# Patient Record
Sex: MALE | Race: WHITE | NOT HISPANIC OR LATINO | Employment: FULL TIME | URBAN - METROPOLITAN AREA
[De-identification: names, ages, dates, MRNs, and addresses within clinical notes are randomized per-mention and may not be internally consistent; named-entity substitution may affect disease eponyms.]

---

## 2017-05-31 ENCOUNTER — OFFICE VISIT (OUTPATIENT)
Dept: FAMILY MEDICINE | Facility: CLINIC | Age: 37
End: 2017-05-31

## 2017-05-31 VITALS
DIASTOLIC BLOOD PRESSURE: 82 MMHG | BODY MASS INDEX: 22.86 KG/M2 | HEART RATE: 101 BPM | SYSTOLIC BLOOD PRESSURE: 131 MMHG | OXYGEN SATURATION: 97 % | WEIGHT: 189 LBS

## 2017-05-31 DIAGNOSIS — F41.1 GENERALIZED ANXIETY DISORDER: ICD-10-CM

## 2017-05-31 DIAGNOSIS — F33.0 MAJOR DEPRESSIVE DISORDER, RECURRENT EPISODE, MILD (H): Chronic | ICD-10-CM

## 2017-05-31 DIAGNOSIS — M25.512 LEFT SHOULDER PAIN, UNSPECIFIED CHRONICITY: Primary | ICD-10-CM

## 2017-05-31 ASSESSMENT — PAIN SCALES - GENERAL: PAINLEVEL: NO PAIN (1)

## 2017-05-31 NOTE — NURSING NOTE
Chief Complaint   Patient presents with     Shoulder Pain     right, no known injury.     37 year old      Blood pressure 131/82, pulse 101, weight 189 lb (85.7 kg), SpO2 97 %. Body mass index is 22.86 kg/(m^2).    Wt Readings from Last 2 Encounters:   05/31/17 189 lb (85.7 kg)   12/29/16 190 lb 8 oz (86.4 kg)     BP Readings from Last 3 Encounters:   05/31/17 131/82   12/29/16 127/79   12/04/16 110/62       Patient Active Problem List   Diagnosis     Benign neoplasm of skin     History of thyroid disease     Numbness in both hands     Numbness in feet     Ocular migraine - Both Eyes     Major Depression     Generalized anxiety disorder       Current Outpatient Prescriptions   Medication Sig Dispense Refill     FLUoxetine 20 MG tablet Take 20 mg by mouth daily       ibuprofen (ADVIL) 200 MG tablet Take 200 mg by mouth every 4 hours as needed for mild pain       ALPRAZolam (XANAX) 0.25 MG tablet Take bid prn 30 tablet 0       Social History   Substance Use Topics     Smoking status: Never Smoker     Smokeless tobacco: Never Used     Alcohol use Yes      Comment: 4-5 days per week one beer or glass of wine         Health Maintenance Due   Topic Date Due     LIPID SCREEN Q5 YR MALE (SYSTEM ASSIGNED)  02/15/2017       DENILSON BROWN CMA  May 31, 2017 9:46 AM

## 2017-05-31 NOTE — MR AVS SNAPSHOT
After Visit Summary   5/31/2017    Asa Mann    MRN: [id number]           Patient Information     Date Of Birth          1980        Visit Information        Provider Department      5/31/2017 9:40 AM Rita Rios MD [facility]        Today's Diagnoses     Left shoulder pain, unspecified chronicity    -  1    Generalized anxiety disorder        Major Depression           Follow-ups after your visit        Additional Services     SPORTS MEDICINE REFERRAL       Your provider has referred you to:      [facility]: Sports Medicine Clinic [facility] [phone]   [email or web address]    Please be aware that coverage of these services is subject to the terms and limitations of your health insurance plan.  Call member services at your health plan with any benefit or coverage questions.      Please bring the following to your appointment:    >>   Any x-rays, CTs or MRIs which have been performed.  Contact the facility where they were done to arrange for  prior to your scheduled appointment.  Any new CT, MRI or other procedures ordered by your specialist must be performed at a [facility] facility or coordinated by your clinic's referral office.    >>   List of current medications   >>   This referral request   >>   Any documents/labs given to you for this referral                  Who to contact     Please call your clinic at [phone] to:    Ask questions about your health    Make or cancel appointments    Discuss your medicines    Learn about your test results    Speak to your doctor   If you have compliments or concerns about an experience at your clinic, or if you wish to file a complaint, please contact [facility] Patient Relations at [phone] or email us at [email or web address]         Additional Information About Your Visit        MyChart Information     [employer] gives you secure access to  your electronic health record. If you see a primary care provider, you can also send messages to your care team and make appointments. If you have questions, please call your primary care clinic.  If you do not have a primary care provider, please call [phone] and they will assist you.      Bill.Forward is an electronic gateway that provides easy, online access to your medical records. With Bill.Forward, you can request a clinic appointment, read your test results, renew a prescription or communicate with your care team.     To access your existing account, please contact your [facility] or call [phone] for assistance.        Care EveryWhere ID     This is your Care EveryWhere ID. This could be used by other organizations to access your [place] medical records  [id number]        Your Vitals Were     Pulse Pulse Oximetry BMI (Body Mass Index)             101 97% 22.86 kg/m2          Blood Pressure from Last 3 Encounters:   05/31/17 131/82   12/29/16 127/79   12/04/16 110/62    Weight from Last 3 Encounters:   05/31/17 189 lb (85.7 kg)   12/29/16 190 lb 8 oz (86.4 kg)   12/04/16 187 lb 4.8 oz (85 kg)              We Performed the Following     SPORTS MEDICINE REFERRAL          Today's Medication Changes          These changes are accurate as of: 5/31/17 10:09 AM.  If you have any questions, ask your nurse or doctor.               Start taking these medicines.        Dose/Directions    FLUoxetine HCl (PMDD) 20 MG Caps   Used for:  Generalized anxiety disorder, Major depressive disorder, recurrent episode, mild (H)   Started by:  Rita Rios MD        Take one daily   Quantity:  90 capsule   Refills:  3         Stop taking these medicines if you haven't already. Please contact your care team if you have questions.     predniSONE 10 MG tablet   Commonly known as:  DELTASONE   Stopped by:  Rita Rios MD                Where to get your medicines      These  medications were sent to [facility] [address] [facility] [address] [facility] [address] [facility]  [address] [facility] [address]     Phone:  [phone]     FLUoxetine HCl (PMDD) 20 MG Caps                Primary Care Provider Office Phone # Fax #    Rita Rios [phone]       [facility] [address] [facility]  [facility] [address]        Thank you!     Thank you for choosing [facility]  for your care. Our goal is always to provide you with excellent care. Hearing back from our patients is one way we can continue to improve our services. Please take a few minutes to complete the written survey that you may receive in the mail after your visit with us. Thank you!             Your Updated Medication List - Protect others around you: Learn how to safely use, store and throw away your medicines at www.disposemymeds.org.          This list is accurate as of: 5/31/17 10:09 AM.  Always use your most recent med list.                   Brand Name Dispense Instructions for use    ADVIL 200 MG tablet   Generic drug:  ibuprofen      Take 200 mg by mouth every 4 hours as needed for mild pain       ALPRAZolam 0.25 MG tablet    XANAX    30 tablet    Take bid prn       FLUoxetine 20 MG tablet      Take 20 mg by mouth daily       FLUoxetine HCl (PMDD) 20 MG Caps     90 capsule    Take one daily

## 2017-05-31 NOTE — PROGRESS NOTES
Asa Mann is a 37 year old male here for the following issues:     Left shoulder pain, unspecified chronicity    Rich has had left shoulder pain since later April. He cannot recall an injury. He has  mild but persistent pain. He is right hand dominant. Takes advil. Hurts mainly in anterior left shoulder. No neck or back pain. Pain is worsened with reaching overhead, with pushing or pulling objects. Pain is least if he keeps his arms at his side.     Anxiety and depression  Goes to counseling regularly, doing well, Fluoxetine 20mg daily.   PHQ9 score = 3  NAVYA 7 score = 4      Patient Active Problem List   Diagnosis     Benign neoplasm of skin     History of thyroid disease     Numbness in both hands     Numbness in feet     Ocular migraine - Both Eyes     Major Depression     Generalized anxiety disorder       Current Outpatient Prescriptions   Medication Sig Dispense Refill     predniSONE (DELTASONE) 10 MG tablet Take 4 tabs per day for 3 days, then 3 tabs on following day, then 2 tabs 17 tablet 0     FLUoxetine 20 MG tablet Take 20 mg by mouth daily       ibuprofen (ADVIL) 200 MG tablet Take 200 mg by mouth every 4 hours as needed for mild pain       ALPRAZolam (XANAX) 0.25 MG tablet Take bid prn 30 tablet 0       Allergies   Allergen Reactions     Amoxicillin Other (See Comments)     Tops of legs turned red, hot and itchy     Zithromax [Azithromycin Dihydrate] Diarrhea        EXAM  /82  Pulse 101  Wt 189 lb (85.7 kg)  SpO2 97%  BMI 22.86 kg/m2  Gen: Alert, pleasant, NAD  Left shoulder : point tenderness over anterior/ top of left shoulder  Pain with bringing arms above head      Assessment:  (M25.512) Left shoulder pain, unspecified chronicity  (primary encounter diagnosis)  Comment: persistent pain, no injury  Plan: SPORTS MEDICINE REFERRAL        Refer to sports medicine for evaluation and treatment    (F41.1) Generalized anxiety disorder  Comment: improved on fluoxetine 20mg daily  Plan:  FLUoxetine HCl, PMDD, 20 MG CAPS        Refilled medication    (F33.0) Major Depression  Comment: doing well  Plan: FLUoxetine HCl, PMDD, 20 MG CAPS        Refilled medication    Rita Rios MD  Internal Medicine/Pediatrics

## 2017-06-28 ASSESSMENT — ANXIETY QUESTIONNAIRES
2. NOT BEING ABLE TO STOP OR CONTROL WORRYING: NOT AT ALL
1. FEELING NERVOUS, ANXIOUS, OR ON EDGE: SEVERAL DAYS
IF YOU CHECKED OFF ANY PROBLEMS ON THIS QUESTIONNAIRE, HOW DIFFICULT HAVE THESE PROBLEMS MADE IT FOR YOU TO DO YOUR WORK, TAKE CARE OF THINGS AT HOME, OR GET ALONG WITH OTHER PEOPLE: SOMEWHAT DIFFICULT
GAD7 TOTAL SCORE: 4
5. BEING SO RESTLESS THAT IT IS HARD TO SIT STILL: NOT AT ALL
7. FEELING AFRAID AS IF SOMETHING AWFUL MIGHT HAPPEN: NOT AT ALL
6. BECOMING EASILY ANNOYED OR IRRITABLE: SEVERAL DAYS
3. WORRYING TOO MUCH ABOUT DIFFERENT THINGS: SEVERAL DAYS

## 2017-06-28 ASSESSMENT — PATIENT HEALTH QUESTIONNAIRE - PHQ9: 5. POOR APPETITE OR OVEREATING: SEVERAL DAYS

## 2017-06-29 ASSESSMENT — ANXIETY QUESTIONNAIRES: GAD7 TOTAL SCORE: 4

## 2017-06-29 ASSESSMENT — PATIENT HEALTH QUESTIONNAIRE - PHQ9: SUM OF ALL RESPONSES TO PHQ QUESTIONS 1-9: 3

## 2017-08-02 DIAGNOSIS — Z13.220 SCREENING FOR HYPERLIPIDEMIA: Primary | ICD-10-CM

## 2017-08-07 DIAGNOSIS — Z13.220 SCREENING FOR HYPERLIPIDEMIA: ICD-10-CM

## 2017-08-07 LAB
ALT SERPL-CCNC: 29 U/L (ref 0–70)
AST SERPL-CCNC: 25 U/L (ref 0–55)
CHOLEST SERPL-MCNC: 240 MG/DL (ref 0–200)
CHOLEST/HDLC SERPL: 5 {RATIO} (ref 0–5)
FASTING SPECIMEN: YES
GLUCOSE SERPL-MCNC: 101 MG/DL (ref 60–109)
HDLC SERPL-MCNC: 48 MG/DL
LDLC SERPL CALC-MCNC: 160 MG/DL (ref 0–129)
TRIGL SERPL-MCNC: 160 MG/DL (ref 0–150)
VLDL-CHOLESTEROL: 32 (ref 7–32)

## 2017-10-04 ENCOUNTER — OFFICE VISIT (OUTPATIENT)
Dept: OPTOMETRY | Facility: CLINIC | Age: 37
End: 2017-10-04

## 2017-10-04 DIAGNOSIS — H52.13 MYOPIA, BILATERAL: Primary | ICD-10-CM

## 2017-10-04 DIAGNOSIS — H52.221 REGULAR ASTIGMATISM OF RIGHT EYE: ICD-10-CM

## 2017-10-04 ASSESSMENT — SLIT LAMP EXAM - LIDS
COMMENTS: NORMAL
COMMENTS: NORMAL

## 2017-10-04 ASSESSMENT — VISUAL ACUITY
METHOD: SNELLEN - LINEAR
OS_CC: 20/20-1
OD_CC: 20/25+2
CORRECTION_TYPE: GLASSES

## 2017-10-04 ASSESSMENT — REFRACTION_CURRENTRX
OS_DIAMETER: 14.2
OD_BASECURVE: 8.5
OD_SPHERE: -4.50
OD_CYLINDER: -1.25
OS_BASECURVE: 8.5
OS_BRAND: ACUVUE 1 DAY MOIST
OS_SPHERE: -5.00
OD_DIAMETER: 14.5
OD_BRAND: ACUVUE 1 DAY MOIST ASTIGMATISM
OD_AXIS: 020

## 2017-10-04 ASSESSMENT — TONOMETRY
IOP_METHOD: ICARE
OS_IOP_MMHG: 26
OD_IOP_MMHG: 21
IOP_METHOD: ICARE
OS_IOP_MMHG: 17

## 2017-10-04 ASSESSMENT — REFRACTION_WEARINGRX
OS_SPHERE: -5.00
SPECS_TYPE: 2 YEARS
OD_CYLINDER: +1.00
OS_CYLINDER: SPHERE
OD_AXIS: 100
OD_SPHERE: -6.00

## 2017-10-04 ASSESSMENT — REFRACTION_MANIFEST
OD_AXIS: 113
OD_SPHERE: -6.50
OS_SPHERE: -5.25
OS_CYLINDER: SPHERE
OD_CYLINDER: +1.50

## 2017-10-04 ASSESSMENT — EXTERNAL EXAM - LEFT EYE: OS_EXAM: NORMAL

## 2017-10-04 ASSESSMENT — EXTERNAL EXAM - RIGHT EYE: OD_EXAM: NORMAL

## 2017-10-04 ASSESSMENT — CUP TO DISC RATIO
OS_RATIO: 0.35
OD_RATIO: 0.35

## 2017-10-04 ASSESSMENT — CONF VISUAL FIELD
OS_NORMAL: 1
OD_NORMAL: 1

## 2017-10-04 NOTE — MR AVS SNAPSHOT
After Visit Summary   10/4/2017    Asa Mann    MRN: [id number]           Patient Information     Date Of Birth          1980        Visit Information        Provider Department      10/4/2017 3:00 PM Pre[facility], [facility]        Today's Diagnoses     Myopia, bilateral    -  1    Regular astigmatism of right eye           Follow-ups after your visit        Who to contact     Please call your clinic at [phone] to:    Ask questions about your health    Make or cancel appointments    Discuss your medicines    Learn about your test results    Speak to your doctor   If you have compliments or concerns about an experience at your clinic, or if you wish to file a complaint, please contact [facility] Patient Relations at [phone] or email us at [email or web address]         Additional Information About Your Visit        MyChart Information     [employer] gives you secure access to your electronic health record. If you see a primary care provider, you can also send messages to your care team and make appointments. If you have questions, please call your primary care clinic.  If you do not have a primary care provider, please call [phone] and they will assist you.      [email or web address] is an electronic gateway that provides easy, online access to your medical records. With [email or web address], you can request a clinic appointment, read your test results, renew a prescription or communicate with your care team.     To access your existing account, please contact your [facility] or call [phone] for assistance.        Care EveryWhere ID     This is your Care EveryWhere ID. This could be used by other organizations to access your [place] medical records  [id number]         Blood Pressure from Last 3 Encounters:   05/31/17 131/82   12/29/16 127/79   12/04/16 110/62    Weight from Last 3 Encounters:   05/31/17 85.7 kg (189  lb)   12/29/16 86.4 kg (190 lb 8 oz)   12/04/16 85 kg (187 lb 4.8 oz)              We Performed the Following     HC CONTACT LENS FITTING COSMETIC LVL 1 (55287.011)     REFRACTION [[id number]]        Primary Care Provider Office Phone # Fax #    Rita Kamla Rios [phone]        [address] [place] [address]        Equal Access to Services     LAMONTE CAMPUZANO : Hadii aad ku hadasho Soomaali, waaxda luqadaha, qaybta kaalmada adeegyada, waxay idiin hayaan adeeg sofieninamaite ladigna . So [facility] [phone].    ATENCIÓN: Si vi madison, tiene a madrid disposición servicios gratuitos de asistencia lingüística. [facility] of [phone].    We comply with applicable federal civil rights laws and [place] laws. We do not discriminate on the basis of race, color, national origin, age, disability, sex, sexual orientation, or gender identity.            Thank you!     Thank you for choosing EYE CLINIC  for your care. Our goal is always to provide you with excellent care. Hearing back from our patients is one way we can continue to improve our services. Please take a few minutes to complete the written survey that you may receive in the mail after your visit with us. Thank you!             Your Updated Medication List - Protect others around you: Learn how to safely use, store and throw away your medicines at www.disposemymeds.org.          This list is accurate as of: 10/4/17 11:59 PM.  Always use your most recent med list.                   Brand Name Dispense Instructions for use Diagnosis    ADVIL 200 MG tablet   Generic drug:  ibuprofen      Take 200 mg by mouth every 4 hours as needed for mild pain        ALPRAZolam 0.25 MG tablet    XANAX    30 tablet    Take bid prn    Anxiety and depression       FLUoxetine 20 MG tablet      Take 20 mg by mouth daily        FLUoxetine HCl (PMDD) 20 MG Caps     90 capsule    Take one daily    Generalized anxiety disorder, Major depressive disorder, recurrent episode,  mild (H)

## 2017-10-05 NOTE — PROGRESS NOTES
"A/P  1.) Myopia OU, Astigmatism OD  -Small spec Rx change, updated today  -Part-time CL wear for sports/exercise, updated CLRx today for dailies  -Fit in two different brands d/t fit. Best combination written  -He would also like to order Halloween lenses (reviewed proper CL safety)    Monitor 1-2 years routine    Contact Lens Billing  V-Code:  - CL, other  Final Contact Lens Rx      Brand Base Curve Diameter Sphere Cylinder Axis Lens   Right Clariti 1 Day Toric 8.6 14.3 -4.75 -1.25 020    Left Acuvue 1 Day Moist 8.5 14.2 -5.00          Expiration Date:  10/4/19    Replacement:  Daily    Solutions:  n/a    Wearing Schedule:  Daily wear only      Final Contact Lens Rx #2      Brand Base Curve Diameter Sphere Cylinder Axis Lens   Right Gothika lens   -5.00   \"Banshee\"   Left Gothika lens   -5.00   \"Banshee\"            # of units: 2  Price per Unit: $15    These are for cosmetic contact lenses.    Encounter Diagnoses   Name Primary?     Myopia, bilateral Yes     Regular astigmatism of right eye           "

## 2017-11-25 NOTE — PROGRESS NOTES
Asa Mann is a 37 year old male here for the following issues:    Anxiety  Rich is on fluoxetine 20mg daily. He has a therapist and meets 4x per week. He reports he and his wife have had marital problems and are planning to divorce. They have  as of September. No children. He has a very old Rx for alprazolam that he has had since 2015. Would like another Rx to use prn. No current use of Etoh or drugs. Sleep is good, no issues there. 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  He eats a healthy diet, getting exercise.   He is not sure if he needs to increase the fluoxetine. No depression. Occasionally has panic attacks. He reports sweating as side effect of the fluoxetine. Also has gained about 10 pounds. Concerned that if he increases the dose he may have some sexual side effects but denies that currently.      PHQ9 score = 3  NAVYA 7 score = 9        Screen for STD (sexually transmitted disease)  Rich would like routine STD testing. He is  and wishes to have testing. No current relationship. Discussed safe sex with condom use with any new partner and he concurs. No current dysuria, or other symptom.     Patient Active Problem List   Diagnosis     Benign neoplasm of skin     History of thyroid disease     Numbness in both hands     Numbness in feet     Ocular migraine - Both Eyes     Major Depression     Generalized anxiety disorder       Current Outpatient Prescriptions   Medication Sig Dispense Refill     FLUoxetine HCl, PMDD, 20 MG CAPS Take one daily 90 capsule 3     FLUoxetine 20 MG tablet Take 20 mg by mouth daily       ibuprofen (ADVIL) 200 MG tablet Take 200 mg by mouth every 4 hours as needed for mild pain       ALPRAZolam (XANAX) 0.25 MG tablet Take bid prn 30 tablet 0       Allergies   Allergen Reactions     Amoxicillin Other (See Comments)     Tops of legs turned red, hot and itchy     Zithromax [Azithromycin  "Dihydrate] Diarrhea        EXAM  /81 (BP Location: Right arm, Patient Position: Chair, Cuff Size: Adult Regular)  Pulse 80  Temp 98.4  F (36.9  C) (Oral)  Ht 6' 2.69\" (189.7 cm)  Wt 186 lb (84.4 kg)  SpO2 97%  BMI 23.44 kg/m2  Gen: Alert, pleasant, NAD  Psych: casually dressed, good eye contact and normal speech, affect is positive.      Assessment:  (F41.1) Generalized anxiety disorder  (primary encounter diagnosis)  Comment: currently increased situational stress, with divorce, change in job stress  Plan: ALPRAZolam (XANAX) 0.25 MG tablet        Continue with fluoxetine 20mg daily, may increase to 2 capsules daily if desired over the next month as he has a 90 day supply. rx for alprazolam with caution not to drink Etoh with med, do not drive with med on board. If he is using alprazolam regularly, then would increase fluoxetine dose. Continue therapy    (Z11.3) Screen for STD (sexually transmitted disease)  Comment: routine testing, no current symptoms  Plan: Neisseria gonorrhoeae PCR, Chlamydia         trachomatis PCR, Anti Treponema, HIV Antigen         Antibody Combo        Discussed safe sex practices.     Rita Rios MD  Internal Medicine/Pediatrics    "

## 2017-11-28 ENCOUNTER — OFFICE VISIT (OUTPATIENT)
Dept: FAMILY MEDICINE | Facility: CLINIC | Age: 37
End: 2017-11-28

## 2017-11-28 VITALS
OXYGEN SATURATION: 97 % | SYSTOLIC BLOOD PRESSURE: 120 MMHG | HEART RATE: 80 BPM | TEMPERATURE: 98.4 F | WEIGHT: 186 LBS | DIASTOLIC BLOOD PRESSURE: 81 MMHG | HEIGHT: 75 IN | BODY MASS INDEX: 23.13 KG/M2

## 2017-11-28 DIAGNOSIS — F41.1 GENERALIZED ANXIETY DISORDER: Primary | ICD-10-CM

## 2017-11-28 DIAGNOSIS — Z11.3 SCREEN FOR STD (SEXUALLY TRANSMITTED DISEASE): ICD-10-CM

## 2017-11-28 LAB — HIV 1+2 AB+HIV1 P24 AG SERPL QL IA: NONREACTIVE

## 2017-11-28 RX ORDER — ALPRAZOLAM 0.25 MG
TABLET ORAL
Qty: 30 TABLET | Refills: 0 | Status: SHIPPED | OUTPATIENT
Start: 2017-11-28 | End: 2021-01-11

## 2017-11-28 ASSESSMENT — PAIN SCALES - GENERAL: PAINLEVEL: NO PAIN (1)

## 2017-11-28 NOTE — NURSING NOTE
"37 year old  Chief Complaint   Patient presents with     Recheck Medication     Check on anxiety meds -xanax.      Labs Only     Wants an STD screening. Everything that can be checked, HIV, etc.        Blood pressure 120/81, pulse 80, temperature 98.4  F (36.9  C), temperature source Oral, height 6' 2.69\" (189.7 cm), weight 186 lb (84.4 kg), SpO2 97 %. Body mass index is 23.44 kg/(m^2).  Patient Active Problem List   Diagnosis     Benign neoplasm of skin     History of thyroid disease     Numbness in both hands     Numbness in feet     Ocular migraine - Both Eyes     Major Depression     Generalized anxiety disorder       Wt Readings from Last 2 Encounters:   11/28/17 186 lb (84.4 kg)   05/31/17 189 lb (85.7 kg)     BP Readings from Last 3 Encounters:   11/28/17 120/81   05/31/17 131/82   12/29/16 127/79         Current Outpatient Prescriptions   Medication     FLUoxetine HCl, PMDD, 20 MG CAPS     FLUoxetine 20 MG tablet     ibuprofen (ADVIL) 200 MG tablet     ALPRAZolam (XANAX) 0.25 MG tablet     No current facility-administered medications for this visit.        Social History   Substance Use Topics     Smoking status: Never Smoker     Smokeless tobacco: Never Used     Alcohol use Yes      Comment: 4-5 days per week one beer or glass of wine       There are no preventive care reminders to display for this patient.    No results found for: HAWA Phelan MA  November 28, 2017 10:08 AM    "

## 2017-11-28 NOTE — MR AVS SNAPSHOT
After Visit Summary   11/28/2017    Asa Mann    MRN: [id number]           Patient Information     Date Of Birth          1980        Visit Information        Provider Department      11/28/2017 10:00 AM Rita Rios MD [facility]        Today's Diagnoses     Generalized anxiety disorder    -  1    Screen for STD (sexually transmitted disease)        Anxiety and depression           Follow-ups after your visit        Who to contact     Please call your clinic at [phone] to:    Ask questions about your health    Make or cancel appointments    Discuss your medicines    Learn about your test results    Speak to your doctor   If you have compliments or concerns about an experience at your clinic, or if you wish to file a complaint, please contact [facility] Patient Relations at [phone] or email us at [email or web address]         Additional Information About Your Visit        MyChart Information     [employer]t gives you secure access to your electronic health record. If you see a primary care provider, you can also send messages to your care team and make appointments. If you have questions, please call your primary care clinic.  If you do not have a primary care provider, please call [phone] and they will assist you.      Loop Survey is an electronic gateway that provides easy, online access to your medical records. With Loop Survey, you can request a clinic appointment, read your test results, renew a prescription or communicate with your care team.     To access your existing account, please contact your [facility] or call [phone] for assistance.        Care EveryWhere ID     This is your Care EveryWhere ID. This could be used by other organizations to access your [place] medical records  [id number]        Your Vitals Were     Pulse Temperature Height Pulse Oximetry BMI (Body Mass Index)        "80 98.4  F (36.9  C) (Oral) 6' 2.69\" (189.7 cm) 97% 23.44 kg/m2        Blood Pressure from Last 3 Encounters:   11/28/17 120/81   05/31/17 131/82   12/29/16 127/79    Weight from Last 3 Encounters:   11/28/17 186 lb (84.4 kg)   05/31/17 189 lb (85.7 kg)   12/29/16 190 lb 8 oz (86.4 kg)              We Performed the Following     Anti Treponema     Chlamydia trachomatis PCR     HIV Antigen Antibody Combo     Neisseria gonorrhoeae PCR          Where to get your medicines      Some of these will need a paper prescription and others can be bought over the counter.  Ask your nurse if you have questions.     Bring a paper prescription for each of these medications     ALPRAZolam 0.25 MG tablet          Primary Care Provider Office Phone # Fax #    Rita Kamla Rios [phone]       [address] [place] [address]        Equal Access to Services     [facility] : Hadii belkis ku hadasho Soomaali, waaxda luqadaha, qaybta kaalmada adeegyada, vladimir vo . So [facility] [phone].    ATENCIÓN: Si habla español, tiene a madrid disposición servicios gratuitos de asistencia lingüística. Llame al [phone].    We comply with applicable federal civil rights laws and [place] laws. We do not discriminate on the basis of race, color, national origin, age, disability, sex, sexual orientation, or gender identity.            Thank you!     Thank you for choosing [facility]  for your care. Our goal is always to provide you with excellent care. Hearing back from our patients is one way we can continue to improve our services. Please take a few minutes to complete the written survey that you may receive in the mail after your visit with us. Thank you!             Your Updated Medication List - Protect others around you: Learn how to safely use, store and throw away your medicines at www.disposemymeds.org.          This list is accurate as of: 11/28/17 10:32 AM.  Always use your " most recent med list.                   Brand Name Dispense Instructions for use Diagnosis    ADVIL 200 MG tablet   Generic drug:  ibuprofen      Take 200 mg by mouth every 4 hours as needed for mild pain        ALPRAZolam 0.25 MG tablet    XANAX    30 tablet    Take bid prn    Anxiety and depression       FLUoxetine HCl (PMDD) 20 MG Caps     90 capsule    Take one daily    Generalized anxiety disorder, Major depressive disorder, recurrent episode, mild (H)

## 2017-11-29 LAB
C TRACH DNA SPEC QL NAA+PROBE: NEGATIVE
N GONORRHOEA DNA SPEC QL NAA+PROBE: NEGATIVE
SPECIMEN SOURCE: NORMAL
SPECIMEN SOURCE: NORMAL
T PALLIDUM IGG+IGM SER QL: NEGATIVE

## 2017-11-29 ASSESSMENT — ANXIETY QUESTIONNAIRES
3. WORRYING TOO MUCH ABOUT DIFFERENT THINGS: MORE THAN HALF THE DAYS
6. BECOMING EASILY ANNOYED OR IRRITABLE: SEVERAL DAYS
2. NOT BEING ABLE TO STOP OR CONTROL WORRYING: SEVERAL DAYS
IF YOU CHECKED OFF ANY PROBLEMS ON THIS QUESTIONNAIRE, HOW DIFFICULT HAVE THESE PROBLEMS MADE IT FOR YOU TO DO YOUR WORK, TAKE CARE OF THINGS AT HOME, OR GET ALONG WITH OTHER PEOPLE: SOMEWHAT DIFFICULT
5. BEING SO RESTLESS THAT IT IS HARD TO SIT STILL: SEVERAL DAYS
GAD7 TOTAL SCORE: 9
1. FEELING NERVOUS, ANXIOUS, OR ON EDGE: SEVERAL DAYS
7. FEELING AFRAID AS IF SOMETHING AWFUL MIGHT HAPPEN: SEVERAL DAYS

## 2017-11-29 ASSESSMENT — PATIENT HEALTH QUESTIONNAIRE - PHQ9
SUM OF ALL RESPONSES TO PHQ QUESTIONS 1-9: 3
5. POOR APPETITE OR OVEREATING: MORE THAN HALF THE DAYS

## 2017-11-30 ASSESSMENT — ANXIETY QUESTIONNAIRES: GAD7 TOTAL SCORE: 9

## 2017-12-20 ENCOUNTER — OFFICE VISIT (OUTPATIENT)
Dept: FAMILY MEDICINE | Facility: CLINIC | Age: 37
End: 2017-12-20
Payer: COMMERCIAL

## 2017-12-20 VITALS
TEMPERATURE: 98.1 F | SYSTOLIC BLOOD PRESSURE: 129 MMHG | BODY MASS INDEX: 23.53 KG/M2 | DIASTOLIC BLOOD PRESSURE: 79 MMHG | WEIGHT: 189.25 LBS | OXYGEN SATURATION: 98 % | HEIGHT: 75 IN | HEART RATE: 74 BPM

## 2017-12-20 DIAGNOSIS — L30.9 DERMATITIS: Primary | ICD-10-CM

## 2017-12-20 RX ORDER — DESONIDE 0.5 MG/G
OINTMENT TOPICAL
Qty: 15 G | Refills: 0 | Status: SHIPPED | OUTPATIENT
Start: 2017-12-20 | End: 2021-01-11

## 2017-12-20 NOTE — PATIENT INSTRUCTIONS
PLAN:  - Try Desonide oint 3 times/day for 1-2 weeks.   -Minimize soaps/alcohol cleansers or other irritant washes.   -If it does not improve, a referral to Dermatology may be indicated

## 2017-12-20 NOTE — MR AVS SNAPSHOT
After Visit Summary   12/20/2017    Asa Mann    MRN: [id number]           Patient Information     Date Of Birth          1980        Visit Information        Provider Department      12/20/2017 3:40 PM Sebastian Logan MD [facility]        Today's Diagnoses     Dermatitis    -  1      Care Instructions      PLAN:  - Try Desonide oint 3 times/day for 1-2 weeks.   -Minimize soaps/alcohol cleansers or other irritant washes.   -If it does not improve, a referral to Dermatology may be indicated            Follow-ups after your visit        Who to contact     Please call your clinic at [phone] to:    Ask questions about your health    Make or cancel appointments    Discuss your medicines    Learn about your test results    Speak to your doctor   If you have compliments or concerns about an experience at your clinic, or if you wish to file a complaint, please contact [facility] Patient Relations at [phone] or email us at [email or web address]         Additional Information About Your Visit        MyChart Information     [employer] gives you secure access to your electronic health record. If you see a primary care provider, you can also send messages to your care team and make appointments. If you have questions, please call your primary care clinic.  If you do not have a primary care provider, please call [phone] and they will assist you.      [employer] is an electronic gateway that provides easy, online access to your medical records. With [employer], you can request a clinic appointment, read your test results, renew a prescription or communicate with your care team.     To access your existing account, please contact your [facility] or call [phone] for assistance.        Care EveryWhere ID     This is your Care EveryWhere ID. This could be used by other organizations to access your [facility]  "records  [id number]        Your Vitals Were     Pulse Temperature Height Pulse Oximetry BMI (Body Mass Index)       74 98.1  F (36.7  C) (Oral) 6' 2.69\" (189.7 cm) 98% 23.85 kg/m2        Blood Pressure from Last 3 Encounters:   12/20/17 129/79   11/28/17 120/81   05/31/17 131/82    Weight from Last 3 Encounters:   12/20/17 189 lb 4 oz (85.8 kg)   11/28/17 186 lb (84.4 kg)   05/31/17 189 lb (85.7 kg)              Today, you had the following     No orders found for display         Today's Medication Changes          These changes are accurate as of: 12/20/17  4:45 PM.  If you have any questions, ask your nurse or doctor.               Start taking these medicines.        Dose/Directions    desonide 0.05 % ointment   Commonly known as:  DESOWEN   Used for:  Dermatitis   Started by:  Sebastian Logan MD        Apply sparingly to affected area three times daily for 14 days.   Quantity:  15 g   Refills:  0            Where to get your medicines      These medications were sent to [facility] [address] [place] [address] AT [place] [id number]    Hours:  24-hours Phone:  [phone]     desonide 0.05 % ointment                Primary Care Provider Office Phone # Fax #    Rita Kamla Rios [phone] [address] [place] [address]        Equal Access to Services     LAMONTE CAMPUZANO AH: Hadii belkis ku hadasho Soomaali, waaxda luqadaha, qaybta kaalmada adeegyada, vladimir fried. So [facility] [phone].    ATENCIÓN: Si lakishala los, tiene a madrid disposición servicios gratuitos de asistencia lingüística. Llame al [phone].    We comply with applicable federal civil rights laws and [place] laws. We do not discriminate on the basis of race, color, national origin, age, disability, sex, sexual orientation, or gender identity.            Thank you!     Thank you for choosing [facility]  for your care. " Our goal is always to provide you with excellent care. Hearing back from our patients is one way we can continue to improve our services. Please take a few minutes to complete the written survey that you may receive in the mail after your visit with us. Thank you!             Your Updated Medication List - Protect others around you: Learn how to safely use, store and throw away your medicines at www.disposemymeds.org.          This list is accurate as of: 12/20/17  4:45 PM.  Always use your most recent med list.                   Brand Name Dispense Instructions for use Diagnosis    ADVIL 200 MG tablet   Generic drug:  ibuprofen      Take 200 mg by mouth every 4 hours as needed for mild pain        ALPRAZolam 0.25 MG tablet    XANAX    30 tablet    Take bid prn    Generalized anxiety disorder       clotrimazole 1 % cream    LOTRIMIN     Place 1 Applicatorful vaginally 2 times daily        desonide 0.05 % ointment    DESOWEN    15 g    Apply sparingly to affected area three times daily for 14 days.    Dermatitis       DOXYCYCLINE HYCLATE PO      Take 100 mg by mouth 2 times daily        FLUoxetine HCl (PMDD) 20 MG Caps     90 capsule    Take one daily    Generalized anxiety disorder, Major depressive disorder, recurrent episode, mild (H)

## 2017-12-20 NOTE — PROGRESS NOTES
"Asa Mann is a 37 year old male who presents to [facility] with the following concern:  - Persistent \"jock itch\". Has had this off an on for about 2 years.     Review Of Systems:  Has otherwise been in usual state of health, e.g.   Cardiovascular: negative  Respiratory: No shortness of breath, dyspnea on exertion, cough, or hemoptysis  Gastrointestinal: negative  Genitourinary: negative    Problem list per EMR:  Patient Active Problem List   Diagnosis     Benign neoplasm of skin     History of thyroid disease     Numbness in both hands     Numbness in feet     Ocular migraine - Both Eyes     Major Depression     Generalized anxiety disorder       Current Outpatient Prescriptions   Medication Sig Dispense Refill     DOXYCYCLINE HYCLATE PO Take 100 mg by mouth 2 times daily       clotrimazole (LOTRIMIN) 1 % cream Place 1 Applicatorful vaginally 2 times daily       ALPRAZolam (XANAX) 0.25 MG tablet Take bid prn 30 tablet 0     FLUoxetine HCl, PMDD, 20 MG CAPS Take one daily 90 capsule 3     ibuprofen (ADVIL) 200 MG tablet Take 200 mg by mouth every 4 hours as needed for mild pain         Allergies   Allergen Reactions     Amoxicillin Other (See Comments)     Tops of legs turned red, hot and itchy     Zithromax [Azithromycin Dihydrate] Diarrhea          EXAM    Vitals: /79  Pulse 74  Temp 98.1  F (36.7  C) (Oral)  Ht 6' 2.69\" (189.7 cm)  Wt 189 lb 4 oz (85.8 kg)  SpO2 98%  BMI 23.85 kg/m2  BMI= Body mass index is 23.85 kg/(m^2).  Appears well.     Rash is located midline between the base of the scrotum and the anus. No rash in the intertrigenous skinfolds. No rash in scrotum nor near corona of penis.   No lymphadenopathy    ASSESSMENT:  -Rash in perineal area that appears to be more of an irritant dermatitis than a fungal infection as not in the creases    PLAN:  - Try Desonide oint 3 times/day for 1-2 weeks.   -Minimize soaps/alcohol cleansers or other irritant washes.   -If it does not " improve, a referral to Dermatology may be indicated    --Sebastian Logan MD  [facility], Department of Family Medicine and Community Health

## 2017-12-20 NOTE — NURSING NOTE
"37 year old  Chief Complaint   Patient presents with     Derm Problem     pt thinks he may have jock itch        Blood pressure 129/79, pulse 74, temperature 98.1  F (36.7  C), temperature source Oral, height 6' 2.69\" (189.7 cm), weight 189 lb 4 oz (85.8 kg), SpO2 98 %. Body mass index is 23.85 kg/(m^2).  Patient Active Problem List   Diagnosis     Benign neoplasm of skin     History of thyroid disease     Numbness in both hands     Numbness in feet     Ocular migraine - Both Eyes     Major Depression     Generalized anxiety disorder       Wt Readings from Last 2 Encounters:   12/20/17 189 lb 4 oz (85.8 kg)   11/28/17 186 lb (84.4 kg)     BP Readings from Last 3 Encounters:   12/20/17 129/79   11/28/17 120/81   05/31/17 131/82         Current Outpatient Prescriptions   Medication     DOXYCYCLINE HYCLATE PO     clotrimazole (LOTRIMIN) 1 % cream     ALPRAZolam (XANAX) 0.25 MG tablet     FLUoxetine HCl, PMDD, 20 MG CAPS     ibuprofen (ADVIL) 200 MG tablet     No current facility-administered medications for this visit.        Social History   Substance Use Topics     Smoking status: Never Smoker     Smokeless tobacco: Never Used     Alcohol use Yes      Comment: 4-5 days per week one beer or glass of wine       Health Maintenance Due   Topic Date Due     DEPRESSION ACTION PLAN Q1 YR  02/15/2000       No results found for: PAP      December 20, 2017 3:41 PM  "

## 2017-12-21 ASSESSMENT — ANXIETY QUESTIONNAIRES
GAD7 TOTAL SCORE: 7
3. WORRYING TOO MUCH ABOUT DIFFERENT THINGS: SEVERAL DAYS
1. FEELING NERVOUS, ANXIOUS, OR ON EDGE: SEVERAL DAYS
6. BECOMING EASILY ANNOYED OR IRRITABLE: SEVERAL DAYS
7. FEELING AFRAID AS IF SOMETHING AWFUL MIGHT HAPPEN: SEVERAL DAYS
2. NOT BEING ABLE TO STOP OR CONTROL WORRYING: SEVERAL DAYS
IF YOU CHECKED OFF ANY PROBLEMS ON THIS QUESTIONNAIRE, HOW DIFFICULT HAVE THESE PROBLEMS MADE IT FOR YOU TO DO YOUR WORK, TAKE CARE OF THINGS AT HOME, OR GET ALONG WITH OTHER PEOPLE: SOMEWHAT DIFFICULT
5. BEING SO RESTLESS THAT IT IS HARD TO SIT STILL: SEVERAL DAYS

## 2017-12-21 ASSESSMENT — PATIENT HEALTH QUESTIONNAIRE - PHQ9
5. POOR APPETITE OR OVEREATING: SEVERAL DAYS
SUM OF ALL RESPONSES TO PHQ QUESTIONS 1-9: 3

## 2017-12-22 ASSESSMENT — ANXIETY QUESTIONNAIRES: GAD7 TOTAL SCORE: 7

## 2018-01-29 ENCOUNTER — OFFICE VISIT (OUTPATIENT)
Dept: FAMILY MEDICINE | Facility: CLINIC | Age: 38
End: 2018-01-29
Payer: COMMERCIAL

## 2018-01-29 VITALS
HEIGHT: 75 IN | OXYGEN SATURATION: 98 % | SYSTOLIC BLOOD PRESSURE: 134 MMHG | HEART RATE: 77 BPM | WEIGHT: 190.01 LBS | DIASTOLIC BLOOD PRESSURE: 82 MMHG | BODY MASS INDEX: 23.63 KG/M2 | TEMPERATURE: 98 F

## 2018-01-29 DIAGNOSIS — Z11.3 SCREEN FOR STD (SEXUALLY TRANSMITTED DISEASE): Primary | ICD-10-CM

## 2018-01-29 DIAGNOSIS — R21 PERINEAL RASH IN MALE: ICD-10-CM

## 2018-01-29 ASSESSMENT — PAIN SCALES - GENERAL: PAINLEVEL: NO PAIN (0)

## 2018-01-29 NOTE — MR AVS SNAPSHOT
After Visit Summary   1/29/2018    Asa Mann    MRN: [id number]           Patient Information     Date Of Birth          1980        Visit Information        Provider Department      1/29/2018 4:00 PM Rita Rios MD [facility]        Today's Diagnoses     Screen for STD (sexually transmitted disease)    -  1    Perineal rash in male           Follow-ups after your visit        Additional Services     Dermatology Referral - [facility]       Your provider has referred you to:     [facility]  [address], [address]  [place] [address]  Ph: [phone]    Please be aware that coverage of these services is subject to the terms and limitations of your health insurance plan.  Call member services at your health plan with any benefit or coverage questions.      Please bring the following to your appointment:  Any x-rays, CTs or MRIs which have been performed.  Contact the facility where they were done to arrange for  prior to your scheduled appointment.  Any new CT, MRI or other procedures ordered by your specialist must be performed at a [facility] facility or coordinated by your clinic's referral office.    List of current medications   This referral request   Any documents/labs given to you for this referral                    Who to contact     Please call your clinic at [phone] to:    Ask questions about your health    Make or cancel appointments    Discuss your medicines    Learn about your test results    Speak to your doctor   If you have compliments or concerns about an experience at your clinic, or if you wish to file a complaint, please contact [facility] Patient Relations at [phone] or email us at [email or web address]         Additional Information About Your Visit        MyChart Information     [employer] gives you secure access to your electronic health  "record. If you see a primary care provider, you can also send messages to your care team and make appointments. If you have questions, please call your primary care clinic.  If you do not have a primary care provider, please call [phone] and they will assist you.      [employer] is an electronic gateway that provides easy, online access to your medical records. With [employer], you can request a clinic appointment, read your test results, renew a prescription or communicate with your care team.     To access your existing account, please contact your [facility] or call [phone] for assistance.        Care EveryWhere ID     This is your Care EveryWhere ID. This could be used by other organizations to access your [facility] medical records  [id number]        Your Vitals Were     Pulse Temperature Height Pulse Oximetry BMI (Body Mass Index)       77 98  F (36.7  C) (Oral) 6' 2.69\" (189.7 cm) 98% 23.95 kg/m2        Blood Pressure from Last 3 Encounters:   01/29/18 134/82   12/20/17 129/79   11/28/17 120/81    Weight from Last 3 Encounters:   01/29/18 190 lb 0.1 oz (86.2 kg)   12/20/17 189 lb 4 oz (85.8 kg)   11/28/17 186 lb (84.4 kg)              We Performed the Following     Anti Treponema     Chlamydia trachomatis PCR     Dermatology Referral - [facility]     Herpes Simplex Virus 1 and 2 IgG     HIV Antigen Antibody Combo     Neisseria gonorrhoeae PCR        Primary Care Provider Office Phone # Fax #    Rita Kamla Rios [phone]       [address] [place] [address]        Equal Access to Services     [facility]: Hadii aad ku hadasho Soomaali, waaxda luqadaha, qaybta kaalmada vladimir magallon. So [facility] [phone].    ATENCIÓN: Si habla español, tiene a madrid disposición servicios gratuitos de asistencia lingüística. Llame al [phone].    We comply with applicable federal civil rights " laws and [place] laws. We do not discriminate on the basis of race, color, national origin, age, disability, sex, sexual orientation, or gender identity.            Thank you!     Thank you for choosing [facility]  for your care. Our goal is always to provide you with excellent care. Hearing back from our patients is one way we can continue to improve our services. Please take a few minutes to complete the written survey that you may receive in the mail after your visit with us. Thank you!             Your Updated Medication List - Protect others around you: Learn how to safely use, store and throw away your medicines at www.disposemymeds.org.          This list is accurate as of 1/29/18  4:42 PM.  Always use your most recent med list.                   Brand Name Dispense Instructions for use Diagnosis    ADVIL 200 MG tablet   Generic drug:  ibuprofen      Take 200 mg by mouth every 4 hours as needed for mild pain        ALPRAZolam 0.25 MG tablet    XANAX    30 tablet    Take bid prn    Generalized anxiety disorder       clotrimazole 1 % cream    LOTRIMIN     Place 1 Applicatorful vaginally 2 times daily        desonide 0.05 % ointment    DESOWEN    15 g    Apply sparingly to affected area three times daily for 14 days.    Dermatitis       DOXYCYCLINE HYCLATE PO      Take 100 mg by mouth 2 times daily        FLUoxetine HCl (PMDD) 20 MG Caps     90 capsule    Take one daily    Generalized anxiety disorder, Major depressive disorder, recurrent episode, mild (H)

## 2018-01-29 NOTE — PROGRESS NOTES
"Asa Mann is a 37 year old male here for the following issues:    Screen for STD (sexually transmitted disease)   Rich is a 38 yo male who wishes to have STD screening. No previous hx of STDs. He is sexually active with a female partner. He is currently using condoms. He is asking about HSV testing so we discussed controversy over that.      Perineum irritation  Rich gets intermittent painful red chafing at his perineum. I had treated this in the past as a fungal infection with clotrimazole and topical steroids. He reports he has been using low potency desowen and he now reports the rash has improved but he is still concerned it will return.  No new soap or detergents.       Patient Active Problem List   Diagnosis     Benign neoplasm of skin     History of thyroid disease     Numbness in both hands     Numbness in feet     Ocular migraine - Both Eyes     Major Depression     Generalized anxiety disorder       Current Outpatient Prescriptions   Medication Sig Dispense Refill     DOXYCYCLINE HYCLATE PO Take 100 mg by mouth 2 times daily       clotrimazole (LOTRIMIN) 1 % cream Place 1 Applicatorful vaginally 2 times daily       desonide (DESOWEN) 0.05 % ointment Apply sparingly to affected area three times daily for 14 days. 15 g 0     ALPRAZolam (XANAX) 0.25 MG tablet Take bid prn 30 tablet 0     FLUoxetine HCl, PMDD, 20 MG CAPS Take one daily 90 capsule 3     ibuprofen (ADVIL) 200 MG tablet Take 200 mg by mouth every 4 hours as needed for mild pain         Allergies   Allergen Reactions     Amoxicillin Other (See Comments)     Tops of legs turned red, hot and itchy     Zithromax [Azithromycin Dihydrate] Diarrhea        EXAM  /82 (BP Location: Right arm, Patient Position: Chair, Cuff Size: Adult Regular)  Pulse 77  Temp 98  F (36.7  C) (Oral)  Ht 6' 2.69\" (189.7 cm)  Wt 190 lb 0.1 oz (86.2 kg)  SpO2 98%  BMI 23.95 kg/m2  Gen: Alert, pleasant, NAD  : normal male genitalia  no penile discharge, " mild erythema at underside of penis, no redness at the shaft or over testicles        Assessment:  (Z11.3) Screen for STD (sexually transmitted disease)  (primary encounter diagnosis)  Comment: no current symptoms  Plan: Neisseria gonorrhoeae PCR, Chlamydia         trachomatis PCR, Anti Treponema, HIV Antigen         Antibody Combo, Herpes Simplex Virus 1 and 2         IgG        Discussed safe sex    (R21) Perineal rash in male  Comment: no current rash today  Plan: Dermatology Referral - [facility]        If rash recurs and is refractory would refer to Dr Benito Cruz, dermatologist.     Rita Rios MD  Internal Medicine/Pediatrics

## 2018-01-29 NOTE — NURSING NOTE
"37 year old  Chief Complaint   Patient presents with     Labs Only     Pt says he wants STD testing. HIV, herpes (antibody test that he has questions about), and every other STD he can be tested for. Denies any symptoms.       Blood pressure 134/82, pulse 77, temperature 98  F (36.7  C), temperature source Oral, height 6' 2.69\" (189.7 cm), weight 190 lb 0.1 oz (86.2 kg), SpO2 98 %. Body mass index is 23.95 kg/(m^2).  Patient Active Problem List   Diagnosis     Benign neoplasm of skin     History of thyroid disease     Numbness in both hands     Numbness in feet     Ocular migraine - Both Eyes     Major Depression     Generalized anxiety disorder       Wt Readings from Last 2 Encounters:   01/29/18 190 lb 0.1 oz (86.2 kg)   12/20/17 189 lb 4 oz (85.8 kg)     BP Readings from Last 3 Encounters:   01/29/18 134/82   12/20/17 129/79   11/28/17 120/81         Current Outpatient Prescriptions   Medication     desonide (DESOWEN) 0.05 % ointment     ALPRAZolam (XANAX) 0.25 MG tablet     FLUoxetine HCl, PMDD, 20 MG CAPS     ibuprofen (ADVIL) 200 MG tablet     DOXYCYCLINE HYCLATE PO     clotrimazole (LOTRIMIN) 1 % cream     No current facility-administered medications for this visit.        Social History   Substance Use Topics     Smoking status: Never Smoker     Smokeless tobacco: Never Used     Alcohol use Yes      Comment: 4-5 days per week one beer or glass of wine       Health Maintenance Due   Topic Date Due     DEPRESSION ACTION PLAN Q1 YR  02/15/2000       No results found for: HAWA Phelan MA  January 29, 2018 4:03 PM    "

## 2018-01-30 LAB
C TRACH DNA SPEC QL NAA+PROBE: NEGATIVE
N GONORRHOEA DNA SPEC QL NAA+PROBE: NEGATIVE
SPECIMEN SOURCE: NORMAL
SPECIMEN SOURCE: NORMAL

## 2018-01-31 LAB
HIV 1+2 AB+HIV1 P24 AG SERPL QL IA: NONREACTIVE
HSV1 IGG SERPL QL IA: <0.2 AI (ref 0–0.8)
HSV2 IGG SERPL QL IA: <0.2 AI (ref 0–0.8)
T PALLIDUM IGG+IGM SER QL: NEGATIVE

## 2018-07-30 DIAGNOSIS — F33.0 MAJOR DEPRESSIVE DISORDER, RECURRENT EPISODE, MILD (H): Chronic | ICD-10-CM

## 2018-07-30 DIAGNOSIS — F41.1 GENERALIZED ANXIETY DISORDER: ICD-10-CM

## 2018-07-30 NOTE — TELEPHONE ENCOUNTER
Last office visit:  01/29/2018, no future appointment.      Medication is being filled for 1 time refill only due to:  Patient needs to be seen because needs PHQ9 assessment updated.   Cindy Conde RN  July 30, 2018 4:26 PM

## 2019-11-07 ENCOUNTER — HEALTH MAINTENANCE LETTER (OUTPATIENT)
Age: 39
End: 2019-11-07

## 2020-11-29 ENCOUNTER — HEALTH MAINTENANCE LETTER (OUTPATIENT)
Age: 40
End: 2020-11-29

## 2021-01-04 ENCOUNTER — TELEPHONE (OUTPATIENT)
Dept: FAMILY MEDICINE | Facility: CLINIC | Age: 41
End: 2021-01-04

## 2021-01-04 NOTE — TELEPHONE ENCOUNTER
"I called patient because he is scheduled for a \"sports injury to arm\". He reports likely \"tennis elbow\" from fishing earlier this fall. It is R arm - elbow and forearm. He is a  and right-handed. OK to keep this appointment.     Greta Guzman RN  01/04/21  4:46 PM    "

## 2021-01-11 ENCOUNTER — OFFICE VISIT (OUTPATIENT)
Dept: FAMILY MEDICINE | Facility: CLINIC | Age: 41
End: 2021-01-11
Payer: COMMERCIAL

## 2021-01-11 VITALS
TEMPERATURE: 96.6 F | OXYGEN SATURATION: 98 % | HEIGHT: 75 IN | HEART RATE: 121 BPM | SYSTOLIC BLOOD PRESSURE: 128 MMHG | BODY MASS INDEX: 23.66 KG/M2 | DIASTOLIC BLOOD PRESSURE: 82 MMHG | WEIGHT: 190.25 LBS

## 2021-01-11 DIAGNOSIS — N40.1 BENIGN PROSTATIC HYPERPLASIA WITH WEAK URINARY STREAM: ICD-10-CM

## 2021-01-11 DIAGNOSIS — M79.631 PAIN OF RIGHT FOREARM: Primary | ICD-10-CM

## 2021-01-11 DIAGNOSIS — F41.1 GENERALIZED ANXIETY DISORDER: ICD-10-CM

## 2021-01-11 DIAGNOSIS — R39.12 BENIGN PROSTATIC HYPERPLASIA WITH WEAK URINARY STREAM: ICD-10-CM

## 2021-01-11 RX ORDER — ALFUZOSIN HYDROCHLORIDE 10 MG/1
10 TABLET, EXTENDED RELEASE ORAL
COMMUNITY
Start: 2020-03-06 | End: 2021-03-09

## 2021-01-11 RX ORDER — FLUOXETINE 10 MG/1
10 CAPSULE ORAL DAILY
Qty: 30 CAPSULE | Refills: 1 | COMMUNITY
Start: 2021-01-11 | End: 2021-03-09

## 2021-01-11 ASSESSMENT — ANXIETY QUESTIONNAIRES
7. FEELING AFRAID AS IF SOMETHING AWFUL MIGHT HAPPEN: NOT AT ALL
6. BECOMING EASILY ANNOYED OR IRRITABLE: NOT AT ALL
IF YOU CHECKED OFF ANY PROBLEMS ON THIS QUESTIONNAIRE, HOW DIFFICULT HAVE THESE PROBLEMS MADE IT FOR YOU TO DO YOUR WORK, TAKE CARE OF THINGS AT HOME, OR GET ALONG WITH OTHER PEOPLE: SOMEWHAT DIFFICULT
3. WORRYING TOO MUCH ABOUT DIFFERENT THINGS: NOT AT ALL
1. FEELING NERVOUS, ANXIOUS, OR ON EDGE: SEVERAL DAYS
2. NOT BEING ABLE TO STOP OR CONTROL WORRYING: NOT AT ALL
5. BEING SO RESTLESS THAT IT IS HARD TO SIT STILL: SEVERAL DAYS
GAD7 TOTAL SCORE: 3

## 2021-01-11 ASSESSMENT — MIFFLIN-ST. JEOR: SCORE: 1860.66

## 2021-01-11 ASSESSMENT — PATIENT HEALTH QUESTIONNAIRE - PHQ9
5. POOR APPETITE OR OVEREATING: SEVERAL DAYS
SUM OF ALL RESPONSES TO PHQ QUESTIONS 1-9: 4

## 2021-01-11 NOTE — NURSING NOTE
"40 year old  Chief Complaint   Patient presents with     Musculoskeletal Problem     right arm pain x 7 mons        Blood pressure 119/73, pulse 121, temperature 96.6  F (35.9  C), temperature source Temporal, height 1.892 m (6' 2.5\"), weight 86.3 kg (190 lb 4 oz), SpO2 98 %. Body mass index is 24.1 kg/m .  Patient Active Problem List   Diagnosis     Benign neoplasm of skin     History of thyroid disease     Numbness in both hands     Numbness in feet     Ocular migraine - Both Eyes     Major Depression     Generalized anxiety disorder       Wt Readings from Last 2 Encounters:   01/11/21 86.3 kg (190 lb 4 oz)   01/29/18 86.2 kg (190 lb 0.1 oz)     BP Readings from Last 3 Encounters:   01/11/21 119/73   01/29/18 134/82   12/20/17 129/79         Current Outpatient Medications   Medication     alfuzosin ER (UROXATRAL) 10 MG 24 hr tablet     FLUoxetine HCl, PMDD, 20 MG CAPS     ibuprofen (ADVIL) 200 MG tablet     ALPRAZolam (XANAX) 0.25 MG tablet     clotrimazole (LOTRIMIN) 1 % cream     desonide (DESOWEN) 0.05 % ointment     DOXYCYCLINE HYCLATE PO     No current facility-administered medications for this visit.        Social History     Tobacco Use     Smoking status: Never Smoker     Smokeless tobacco: Never Used   Substance Use Topics     Alcohol use: Yes     Comment: 4-5 days per week one beer or glass of wine     Drug use: No       Health Maintenance Due   Topic Date Due     PREVENTIVE CARE VISIT  1980     DEPRESSION ACTION PLAN  1980     HEPATITIS C SCREENING  02/15/2000     PHQ-9  06/20/2018       No results found for: PAP      January 11, 2021 2:32 PM  "

## 2021-01-11 NOTE — PROGRESS NOTES
"Asa Mann is a 40 year old male here for the following issues. He had to switch to [facility] clinics for a while due to insurance change but can now return to our clinic.     Pain of right forearm  Rich is a 39yo right handed male who reports he \"overdid it\" fishing in September 2020. Has no distinct injury. Reports right forearm began to ache after a fishing trip.  He tried rest, taking advil and it helped temporarily but symptoms persist. Denies numbness. No previous tendonitis.     History of prostatitis  Rich follows with the urology clinic for treatment of an enlarged prostate. He takes Uraxetrol daily, which is helping. He was referred to the pelvic floor clinic for PT.   Medication improves urine flow and hesitancy. No current dysuria or issues. Had some ED issues which improved when he decreased dose of fluoxetine from 20mg to 10mg.    PHQ 11/29/2017 12/21/2017 1/11/2021   PHQ-9 Total Score 3 3 4   Q9: Thoughts of better off dead/self-harm past 2 weeks Not at all Not at all Not at all     NAVYA-7 SCORE 11/29/2017 12/21/2017 1/11/2021   Total Score 9 7 3     Anxiety  I have treated Rich for depression and anxiety in the past. He went through a divorce an now reports being in a good relationship. 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   Reports drinking Etoh,  2 per day.  Exercising, walking regularly  Therapist 2 times per week  No panic attacks  No hospitalizations  He takes medication consistently, has enough medication to last until March 2021.      Patient Active Problem List   Diagnosis     Benign neoplasm of skin     History of thyroid disease     Numbness in both hands     Numbness in feet     Ocular migraine - Both Eyes     Major Depression     Generalized anxiety disorder       Current Outpatient Medications  Current Outpatient Medications   Medication Sig Dispense Refill     alfuzosin ER (UROXATRAL) 10 MG 24 hr tablet Take 10 mg by mouth       FLUoxetine (PROZAC) 10 MG capsule Take 1 capsule (10 " "mg) by mouth daily 30 capsule 1     ibuprofen (ADVIL) 200 MG tablet Take 200 mg by mouth every 4 hours as needed for mild pain              Allergies   Allergen Reactions     Amoxicillin Other (See Comments)     Tops of legs turned red, hot and itchy     Zithromax [Azithromycin Dihydrate] Diarrhea        EXAM  /82   Pulse 121   Temp 96.6  F (35.9  C) (Temporal)   Ht 1.892 m (6' 2.5\")   Wt 86.3 kg (190 lb 4 oz)   SpO2 98%   BMI 24.10 kg/m    Gen: Alert, pleasant, NAD  Psych: casually groomed, good eye contact, normal speech, positive affect  MS: no redness or swelling over right elbow or forearm.   Point tenderness with palpation over soft tissues of the right forearm, proximal to the antecubital fossa, extending down the forearm. No point tenderness over medial or lateral epicondyle.       Assessment:  (M79.631) Pain of right forearm  (primary encounter diagnosis)  Comment: suspect tendonitis of forearm, not classic epicondylitis.   Plan: OCCUPATIONAL THERAPY REFERRAL        Refer to hand therapy , if not improving then refer to orthopedics, no numbness or weakness. Discussed cool packs, rest, NSAIDs    (F41.1) Generalized anxiety disorder  Comment: doing very well with fluoxetine 10mg daily  Plan: FLUoxetine (PROZAC) 10 MG capsule        Continue therapy, notify me if medication refill is needed    (N40.1,  R39.12) Benign prostatic hyperplasia with weak urinary stream  Comment: doing well with uraxetrol  Plan: follow up with urology as needed. Notify MD for any acute changes or sign of infection      Precharting 0 min  Time during visit: 13 min  Post visit chartin minutes  Total 33 min      Rita Rios MD  Internal Medicine/Pediatrics        "

## 2021-01-12 ASSESSMENT — ANXIETY QUESTIONNAIRES: GAD7 TOTAL SCORE: 3

## 2021-01-14 ENCOUNTER — THERAPY VISIT (OUTPATIENT)
Dept: OCCUPATIONAL THERAPY | Facility: CLINIC | Age: 41
End: 2021-01-14
Attending: INTERNAL MEDICINE
Payer: COMMERCIAL

## 2021-01-14 DIAGNOSIS — M79.631 PAIN OF RIGHT FOREARM: ICD-10-CM

## 2021-01-14 PROCEDURE — 97140 MANUAL THERAPY 1/> REGIONS: CPT | Mod: GO | Performed by: OCCUPATIONAL THERAPIST

## 2021-01-14 PROCEDURE — 97165 OT EVAL LOW COMPLEX 30 MIN: CPT | Mod: GO | Performed by: OCCUPATIONAL THERAPIST

## 2021-01-14 PROCEDURE — 97026 INFRARED THERAPY: CPT | Mod: GO | Performed by: OCCUPATIONAL THERAPIST

## 2021-01-14 PROCEDURE — 97110 THERAPEUTIC EXERCISES: CPT | Mod: GO | Performed by: OCCUPATIONAL THERAPIST

## 2021-01-14 NOTE — PROGRESS NOTES
Hand Therapy Initial Evaluation  Current Date:  1/14/2021    Diagnosis: Right Forearm Pain   DOI: 1/11/2021 (MD pan), Onset 9/2020  Post:  4 months    Precautions: N/A    Subjective:  Asa Mann is a 40 year old male.    Patient reports symptoms of the right forearm which occurred due to fishing in September. Since onset symptoms are Gradually getting better and hit a plateau.  General health as reported by patient is good.  Pertinent medical history includes:Concussions/Dizziness, Depression, Numbness/Tingling  Medical allergies:Amoxicillin.  Surgical history: other: appendectomy.  Medication history: Anti-depressants, Anti-inflammatory, other.    Current occupation is   Job Tasks: Computer Work, Prolonged Sitting    Occupational Profile Information:  Right hand dominant  Prior functional level:  no limitations  Patient reports symptoms of pain, stiffness/loss of motion and weakness/loss of strength  Special tests:  none.    Previous treatment: rest  Barriers include:none  Mobility: No difficulty  Transportation: drives  Currently working in normal job without restrictions  Leisure activities/hobbies: Fishing, walking  Other: Lives with partner    Functional Outcome Measure:   Upper Extremity Functional Index Score:  SCORE:   Column Totals: /80: 66   (A lower score indicates greater disability.)    Objective:    Pain Level (Scale 0-10):   1/14/2021   At Rest 1-2   With Use 4-5   At worst 7-8     Pain Description:  Date 1/14/2021   Location Right forearm/extensors   Pain Quality Aching, burning, fatigue, sore   Frequency intermittent, constant or daily     Pain is worst daytime   Exacerbated by Chopping, lifting something heavy (pan)   Relieved by modifying activity, resting   Progression Unchanged since October     ROM  Pain Report: - none  + mild    ++ moderate    +++ severe   Elbow 1/14/2021 1/14/2021   AROM (PROM) Left Right   Extension -5 -5   Flexion 145 145   Supination 80 80    Pronation 80 80     ROM  Pain Report: - none  + mild    ++ moderate    +++ severe   Wrist 2021   AROM (PROM) Left Right   Extension 75 65 tight   Flexion 90 90   RD 15 15   UD 40 40     Strength   (Measured in pounds)  Pain Report: - none  + mild    ++ moderate    +++ severe    2021   Trials Left Right   1  2  3 106 120-   Average 106 120     Lat Pinch 2021   Trials Left Right   1  2  3 27 28-   Average 27 28     3 Pt Pinch 2021   Trials Left RIght   1  2  3 22 23-   Average 22 23     Edema:  None    Scar/Wound:  N/A    Sensation: WNL throughout all nerve distributions; per patient report    Special Tests:   Date 21   Side Right   Tinels CT NT   Tinels PIN -   Tinels DSRN -   Tinels cubital tunnel NT   Tinels Guyons Canal NT     Radial Nerve ULTT:   80% compared to contralateral side    Resisted Testin21   Elbow Extension -   Elbow Flexion -   Supination -   Pronation -   Wrist Flex -   Wrist Ext -   Wrist Ext   elbow extended +   Long finger test +     Palpation  Date 21   Side Right   LEP -   Radial Head -   Extensors +   PIN + mildly tender         Assessment:  Patient presents with symptoms consistent with diagnosis as listed above with conservative intervention.     Patient's limitations or Problem List includes:  Pain, Tightness in musculature and Adherence in connective tissue of the right forearm which interferes with the patient's ability to perform Recreational Activities and Household Chores as compared to previous level of function.    Rehab Potential:  Excellent - Return to full activity, no limitations    Patient will benefit from skilled Occupational Therapy to decrease pain and muscular tension to return to previous activity level and resume normal daily tasks and to reach their rehab potential.    Barriers to Learning:  No barrier    Communication Issues:  Patient appears to be able to clearly communicate and  understand verbal and written communication and follow directions correctly.    Chart Review: Chart Review and Detailed history review with patient    Identified Performance Deficits: home establishment and management, meal preparation and cleanup and leisure activities    Assessment of Occupational Performance:  3-5 Performance Deficits    Clinical Decision Making (Complexity): Low complexity    Treatment Explanation:  The following has been discussed with the patient:  RX ordered/plan of care  Anticipated outcomes  Possible risks and side effects    Plan:  Frequency:  2 X a month, once daily  Duration:  for 3 months    Treatment Plan:  Modalities:  US and Laser Light  Therapeutic Exercise:  PROM, Isotonics and Isometrics  Neuromuscular re-education:  Nerve Gliding  Manual Techniques:  Friction massage and Myofascial release  Orthotic Fabrication:  Forearm based orthosis  Self Care:  Self Care Tasks    Home Program:  Avoid activities that aggravate pain in the forearm  Heat - epsom salt soaks  STM to extensors with spiky ball  Self radial head mobs  FA extensor stretches    Next visit:   Laser  STM  Stretches  Add RN glides  Future visits: wrist splint if needed to rest extensors    Discharge Plan:  Achieve all LTG.  Independent in home treatment program.  Reach maximal therapeutic benefit.    Please see daily flow sheet for treatment and 1:1 time provided today.

## 2021-03-09 DIAGNOSIS — R39.12 BENIGN PROSTATIC HYPERPLASIA WITH WEAK URINARY STREAM: Primary | ICD-10-CM

## 2021-03-09 DIAGNOSIS — F41.1 GENERALIZED ANXIETY DISORDER: ICD-10-CM

## 2021-03-09 DIAGNOSIS — N40.1 BENIGN PROSTATIC HYPERPLASIA WITH WEAK URINARY STREAM: Primary | ICD-10-CM

## 2021-03-09 RX ORDER — FLUOXETINE 10 MG/1
10 CAPSULE ORAL DAILY
Qty: 90 CAPSULE | Refills: 3 | Status: SHIPPED | OUTPATIENT
Start: 2021-03-09 | End: 2022-01-20

## 2021-03-09 RX ORDER — ALFUZOSIN HYDROCHLORIDE 10 MG/1
10 TABLET, EXTENDED RELEASE ORAL DAILY
Qty: 90 TABLET | Refills: 1 | Status: SHIPPED | OUTPATIENT
Start: 2021-03-09 | End: 2021-03-19

## 2021-03-11 NOTE — TELEPHONE ENCOUNTER
MEDICAL RECORDS REQUEST   [facility]  Urology Clinic  [address] [place] [address]  PHONE: [phone]  Fax: [phone]        FUTURE VISIT INFORMATION                                                   Asa Mann, : 1980 scheduled for future visit at [facility]    APPOINTMENT INFORMATION:    Date: 3/19/2021    Provider:  LINK Wilde    Reason for Visit/Diagnosis: BPH    REFERRAL INFORMATION:    Referring provider:  N/A    Specialty: N/A    Referring providers clinic:      Clinic contact number:  N/A    RECORDS REQUESTED FOR VISIT                                                     NOTES  STATUS/DETAILS   OFFICE NOTE from referring provider  yes   OFFICE NOTE from other specialist  yes   DISCHARGE SUMMARY from hospital  no   DISCHARGE REPORT from the ER  no   OPERATIVE REPORT  no   MEDICATION LIST  yes     PRE-VISIT CHECKLIST      Record collection complete Yes   Appointment appropriately scheduled           (right time/right provider) Yes   MyChart activation Yes   Questionnaire complete If no, please explain: In process      Completed by: Jaylene Ratliff

## 2021-03-18 PROBLEM — M79.631 PAIN OF RIGHT FOREARM: Status: RESOLVED | Noted: 2021-01-14 | Resolved: 2021-03-18

## 2021-03-19 ENCOUNTER — PRE VISIT (OUTPATIENT)
Dept: UROLOGY | Facility: CLINIC | Age: 41
End: 2021-03-19

## 2021-03-19 ENCOUNTER — VIRTUAL VISIT (OUTPATIENT)
Dept: UROLOGY | Facility: CLINIC | Age: 41
End: 2021-03-19
Attending: INTERNAL MEDICINE
Payer: COMMERCIAL

## 2021-03-19 DIAGNOSIS — N40.1 BENIGN PROSTATIC HYPERPLASIA WITH WEAK URINARY STREAM: ICD-10-CM

## 2021-03-19 DIAGNOSIS — Z87.438 HISTORY OF PROSTATITIS: Primary | ICD-10-CM

## 2021-03-19 DIAGNOSIS — R39.12 BENIGN PROSTATIC HYPERPLASIA WITH WEAK URINARY STREAM: ICD-10-CM

## 2021-03-19 PROCEDURE — 99203 OFFICE O/P NEW LOW 30 MIN: CPT | Mod: 95 | Performed by: NURSE PRACTITIONER

## 2021-03-19 RX ORDER — ALFUZOSIN HYDROCHLORIDE 10 MG/1
10 TABLET, EXTENDED RELEASE ORAL DAILY
Qty: 90 TABLET | Refills: 3 | Status: SHIPPED | OUTPATIENT
Start: 2021-03-19 | End: 2022-01-20

## 2021-03-19 NOTE — LETTER
3/19/2021       RE: Asa Mann  [address]  [facility] [address]     Dear Colleague,    Thank you for referring your patient, Asa Mann, to the [facility] UROLOGY CLINIC [place] [facility]. Please see a copy of my visit note below.    Asa is a 41 year old who is being evaluated via a billable video visit.      Video Visit Technology for this patient: Thuy Video Visit- Patient was left in waiting room      How would you like to obtain your AVS? MyChart  If the video visit is dropped, the invitation should be resent by: Send to e-mail at: [email or web address]  Will anyone else be joining your video visit? No    Video Start Time: 2:46 PM  Video-Visit Details    Type of service:  Video Visit    Video End Time:2:57 PM    Originating Location (pt. Location): Home    Distant Location (provider location):  [facility] UROLOGY [facility]     Platform used for Video Visit: Thuy        Asa Mann complains of   Chief Complaint   Patient presents with     Consult For     BPH         Assessment/Plan:  41 year old male with a history of chronic prostatitis/BPH, managed well with alfuzosin. No complaints today. Is curious about how long the alfuzosin should be taken, and we discussed that this is primarily a symptom-control med, and that he can try stopping it at any time. Overall, his symptoms are better while on it, so will continue. He will also plan to revisit exercises from PFPT.   -Alfuzosin refills sent.  -Patient to follow up with me in 1 year for an in-clinic office visit to complete uroflow/PVR.     Marleny Wilde, CNP  Department of Urology      Subjective:   41 year old male with a history of chronic prostatitis, managed with alfuzosin. Has previously followed with urologist at [facility], but is consolidating care to [facility], hence is appointment with me today. Has had issues with hesitant stream, but  symptoms have seemed well-controlled on the alfuzosin. Has also worked with PFPT in the past, but has not revisited these exercises for a while. He has a folder containing these exercises and will refer back to that.       Objective:     Exam:   Patient is a 41 year old male   General Appearance: Well groomed, hygenic  Respiratory: No cough, no respiratory distress or labored breathing  Musculoskeletal: Grossly normal with no gross deficits  Skin: No discoloration or apparent rashes  Neurologic: No tremors  Psychiatric: Alert and oriented  Further examination is deferred due to the nature of our visit.

## 2021-03-19 NOTE — PATIENT INSTRUCTIONS
UROLOGY CLINIC VISIT PATIENT INSTRUCTIONS    -Continue alfuzosin. I have sent refills to your pharmacy.  -Follow up with me in one year for an in-office visit.     If you have any issues, questions or concerns in the meantime, do not hesitate to contact us at [phone] or via [employer].     It was a pleasure meeting with you today.  Thank you for allowing me and my team the privilege of caring for you today.  YOU are the reason we are here, and I truly hope we provided you with the excellent service you deserve.  Please let us know if there is anything else we can do for you so that we can be sure you are leaving completely satisfied with your care experience.    Marleny Wilde, CNP

## 2021-03-19 NOTE — PROGRESS NOTES
Asa is a 41 year old who is being evaluated via a billable video visit.      Video Visit Technology for this patient: Thuy Video Visit- Patient was left in waiting room      How would you like to obtain your AVS? MyChart  If the video visit is dropped, the invitation should be resent by: Send to e-mail at: [email or web address]  Will anyone else be joining your video visit? No    Video Start Time: 2:46 PM  Video-Visit Details    Type of service:  Video Visit    Video End Time:2:57 PM    Originating Location (pt. Location): Home    Distant Location (provider location):  [facility] UROLOGY CL[facility] [place]     Platform used for Video Visit: Thuy Bray MADELEINE Mann complains of   Chief Complaint   Patient presents with     Consult For     BPH         Assessment/Plan:  41 year old male with a history of chronic prostatitis/BPH, managed well with alfuzosin. No complaints today. Is curious about how long the alfuzosin should be taken, and we discussed that this is primarily a symptom-control med, and that he can try stopping it at any time. Overall, his symptoms are better while on it, so will continue. He will also plan to revisit exercises from PFPT.   -Alfuzosin refills sent.  -Patient to follow up with me in 1 year for an in-clinic office visit to complete uroflow/PVR.     Marleny Wilde, CNP  Department of Urology      Subjective:   41 year old male with a history of chronic prostatitis, managed with alfuzosin. Has previously [place] [facility] but is consolid[facility] care to [facility], hence is appointment with me today. Has had issues with hesitant stream, but symptoms have seemed well-controlled on the alfuzosin. Has also worked with PFPT in the past, but has not revisited these exercises for a while. He has a folder containing these exercises and will refer back to that.       Objective:     Exam:   Patient is a 41 year old male   General Appearance: Well groomed,  hygenic  Respiratory: No cough, no respiratory distress or labored breathing  Musculoskeletal: Grossly normal with no gross deficits  Skin: No discoloration or apparent rashes  Neurologic: No tremors  Psychiatric: Alert and oriented  Further examination is deferred due to the nature of our visit.

## 2021-05-06 ENCOUNTER — OFFICE VISIT (OUTPATIENT)
Dept: OPHTHALMOLOGY | Facility: CLINIC | Age: 41
End: 2021-05-06
Payer: COMMERCIAL

## 2021-05-06 DIAGNOSIS — H52.13 MYOPIA OF BOTH EYES: Primary | ICD-10-CM

## 2021-05-06 DIAGNOSIS — H10.13 ALLERGIC CONJUNCTIVITIS OF BOTH EYES: ICD-10-CM

## 2021-05-06 DIAGNOSIS — H43.393 VITREOUS SYNERESIS OF BOTH EYES: ICD-10-CM

## 2021-05-06 PROCEDURE — 92015 DETERMINE REFRACTIVE STATE: CPT | Performed by: OPTOMETRIST

## 2021-05-06 PROCEDURE — 92014 COMPRE OPH EXAM EST PT 1/>: CPT | Performed by: OPTOMETRIST

## 2021-05-06 RX ORDER — OLOPATADINE HYDROCHLORIDE 2 MG/ML
1 SOLUTION/ DROPS OPHTHALMIC DAILY
Qty: 1 ML | Refills: 3 | Status: SHIPPED | OUTPATIENT
Start: 2021-05-06 | End: 2022-01-20

## 2021-05-06 ASSESSMENT — REFRACTION_WEARINGRX
OS_CYLINDER: SPHERE
OD_AXIS: 113
OD_SPHERE: -6.50
OD_CYLINDER: +1.50
OS_SPHERE: -5.25

## 2021-05-06 ASSESSMENT — VISUAL ACUITY
OD_CC: 20/30
CORRECTION_TYPE: GLASSES
OS_CC+: -3
METHOD: SNELLEN - LINEAR
OD_CC+: -2
OS_CC: 20/25

## 2021-05-06 ASSESSMENT — CONF VISUAL FIELD
METHOD: COUNTING FINGERS
OD_NORMAL: 1
OS_NORMAL: 1

## 2021-05-06 ASSESSMENT — CUP TO DISC RATIO
OS_RATIO: 0.45
OD_RATIO: 0.45

## 2021-05-06 ASSESSMENT — EXTERNAL EXAM - LEFT EYE: OS_EXAM: NORMAL

## 2021-05-06 ASSESSMENT — REFRACTION_MANIFEST
OS_CYLINDER: +0.50
OD_CYLINDER: +1.50
OD_AXIS: 105
OS_SPHERE: -5.75
OD_SPHERE: -5.75
OS_AXIS: 111

## 2021-05-06 ASSESSMENT — TONOMETRY
OD_IOP_MMHG: 21
OS_IOP_MMHG: 21
IOP_METHOD: ICARE

## 2021-05-06 ASSESSMENT — SLIT LAMP EXAM - LIDS
COMMENTS: NORMAL
COMMENTS: NORMAL

## 2021-05-06 ASSESSMENT — EXTERNAL EXAM - RIGHT EYE: OD_EXAM: NORMAL

## 2021-05-06 NOTE — PROGRESS NOTES
"History  HPI     COMPREHENSIVE EYE EXAM     In both eyes.  Charactertized as  blurred vision.  Associated symptoms include floaters (longstanding per pt, no changes).  Negative for eye pain, flashes and tearing.  Pain was noted as 0/10.              Comments     Reading in the RE is bothersome sometimes, rarely in the morning \"I have some strobing in BE, usually in the eye side that I slept on\", lasts for about a minute or so.     Paulette Thorne COT May 6, 2021 9:56 AM            Last edited by Carla Thorne on 5/6/2021  9:56 AM. (History)          Assessment/Plan  (H52.13) Myopia of both eyes  (primary encounter diagnosis)  Comment: Myopia both eyes, small change in distance correction (less power) improves near acuity  Plan: REFRACTION         Educated patient on condition and clinical findings. Dispensed spectacle prescription for full time wear. Monitor annually.    (H10.13) Allergic conjunctivitis of both eyes  Comment: Minimally symptomatic, contact lens intolerance  Plan: olopatadine (PATADAY) 0.2 % ophthalmic solution         Prescribed Pataday once every day both eyes. Monitor as needed.    (H43.393) Vitreous syneresis of both eyes  Comment: Minimally symptomatic  Plan:  No treatment indicated at this time. Monitor annually.    Return to clinic in 1 year for comprehensive eye exam.    Complete documentation of historical and exam elements from today's encounter can  be found in the full encounter summary report (not reduplicated in this progress  note). I personally obtained the chief complaint(s) and history of present illness. I  confirmed and edited as necessary the review of systems, past medical/surgical  history, family history, social history, and examination findings as documented by  others; and I examined the patient myself. I personally reviewed the relevant tests,  images, and reports as documented above. I formulated and edited as necessary the  assessment and plan and discussed the findings and " management plan with the  patient and family.    David Can OD, FAAO`

## 2021-05-06 NOTE — NURSING NOTE
"Chief Complaints and History of Present Illnesses   Patient presents with     COMPREHENSIVE EYE EXAM     Chief Complaint(s) and History of Present Illness(es)     COMPREHENSIVE EYE EXAM     Laterality: both eyes    Quality: blurred vision    Associated symptoms: floaters (longstanding per pt, no changes).  Negative for eye pain, flashes and tearing    Pain scale: 0/10              Comments     Reading in the RE is bothersome sometimes, rarely in the morning \"I have some strobing in BE, usually in the eye side that I slept on\", lasts for about a minute or so.     Paulette Thorne COT May 6, 2021 9:56 AM                  "

## 2021-07-20 ENCOUNTER — OFFICE VISIT (OUTPATIENT)
Dept: FAMILY MEDICINE | Facility: CLINIC | Age: 41
End: 2021-07-20
Payer: COMMERCIAL

## 2021-07-20 VITALS
HEART RATE: 80 BPM | DIASTOLIC BLOOD PRESSURE: 75 MMHG | SYSTOLIC BLOOD PRESSURE: 120 MMHG | HEIGHT: 74 IN | WEIGHT: 191.25 LBS | BODY MASS INDEX: 24.54 KG/M2 | OXYGEN SATURATION: 98 % | TEMPERATURE: 97.1 F

## 2021-07-20 DIAGNOSIS — A09 DIARRHEA OF INFECTIOUS ORIGIN: ICD-10-CM

## 2021-07-20 DIAGNOSIS — L98.9 SKIN LESION: Primary | ICD-10-CM

## 2021-07-20 ASSESSMENT — ANXIETY QUESTIONNAIRES
7. FEELING AFRAID AS IF SOMETHING AWFUL MIGHT HAPPEN: SEVERAL DAYS
GAD7 TOTAL SCORE: 6
6. BECOMING EASILY ANNOYED OR IRRITABLE: SEVERAL DAYS
5. BEING SO RESTLESS THAT IT IS HARD TO SIT STILL: SEVERAL DAYS
3. WORRYING TOO MUCH ABOUT DIFFERENT THINGS: SEVERAL DAYS
IF YOU CHECKED OFF ANY PROBLEMS ON THIS QUESTIONNAIRE, HOW DIFFICULT HAVE THESE PROBLEMS MADE IT FOR YOU TO DO YOUR WORK, TAKE CARE OF THINGS AT HOME, OR GET ALONG WITH OTHER PEOPLE: SOMEWHAT DIFFICULT
2. NOT BEING ABLE TO STOP OR CONTROL WORRYING: NOT AT ALL
1. FEELING NERVOUS, ANXIOUS, OR ON EDGE: SEVERAL DAYS

## 2021-07-20 ASSESSMENT — PATIENT HEALTH QUESTIONNAIRE - PHQ9
SUM OF ALL RESPONSES TO PHQ QUESTIONS 1-9: 3
5. POOR APPETITE OR OVEREATING: SEVERAL DAYS

## 2021-07-20 ASSESSMENT — MIFFLIN-ST. JEOR: SCORE: 1852.25

## 2021-07-20 NOTE — PROGRESS NOTES
Asa Mann is a 41 year old male here for the following issues:    Mole  Asa presents with concerns of a mole on his left leg. He originally felt something raised that felt like a scab. He picked it, and after this, he realized it looked like a mole that was new. He reports it has dried up and fallen off since then. His father has a history of basal cell but no known family history of melanoma.     Diarrhea  Asa also reports that he has been experiencing diarrhea (liquid) for the past week. This started on his flight home from [place] last week (7/13/21). During the first 24 hours, he was experiencing nausea, vomiting while on the plane, and difficulty keeping food or water down. Denies cramping. No bloody stools. When the diarrhea started, he was having about 10 BMs per day, now having 2-3 BMs per day. He had been waking up at night to have a BM, but this is no longer occurring. Has been taking pepto bismol tablets with some relief of symptoms.     Patient Active Problem List   Diagnosis     Benign neoplasm of skin     History of thyroid disease     Ocular migraine - Both Eyes     Major Depression     Generalized anxiety disorder     Benign prostatic hyperplasia with weak urinary stream       Current Outpatient Medications   Medication Sig Dispense Refill     alfuzosin ER (UROXATRAL) 10 MG 24 hr tablet Take 1 tablet (10 mg) by mouth daily 90 tablet 3     FLUoxetine (PROZAC) 10 MG capsule Take 1 capsule (10 mg) by mouth daily 90 capsule 3     ibuprofen (ADVIL) 200 MG tablet Take 200 mg by mouth every 4 hours as needed for mild pain       olopatadine (PATADAY) 0.2 % ophthalmic solution Place 1 drop into both eyes daily 1 mL 3       Allergies   Allergen Reactions     Amoxicillin Other (See Comments)     Tops of legs turned red, hot and itchy     Zithromax [Azithromycin Dihydrate] Diarrhea     Penicillins Rash        EXAM  /75 (BP Location: Left arm, Patient Position: Sitting, Cuff Size: Adult  "Large)   Pulse 80   Temp 97.1  F (36.2  C) (Temporal)   Ht 1.88 m (6' 2\")   Wt 86.8 kg (191 lb 4 oz)   SpO2 98%   BMI 24.56 kg/m     General: awake, alert, no acute distress.   Skin: 1mm lesion along left medial calf, along tibia.             Assessment:  (L98.9) Skin lesion  (primary encounter diagnosis)  Comment: New skin lesion. 1x1,mm. No family history of melanoma.    Plan: A photo was taken of the lesion to be placed in his chart. We will watch it for the next month or two, to monitor for any changes. If the lesion changes significantly or grows in size, we may refer to dermatology for further evaluation and treatment.     (A09) Diarrhea of infectious origin  Comment: New onset of liquid diarrhea since returning from [place] 1 week ago. Has improved, but still present. Ddx includes giardia, other enteric bacteria.   Plan: Enteric Bacteria and Virus Panel by [facility] Stool,         Ova and Parasite Exam Routine,         Cryptosporidium/Giardia Immunoassay        Obtain stool cultures and await results, no antibiotic prescribed today.    20 minutes spend on the date of this encounter doing chart review, history and exam, documentation and further activities as noted above.       Rita Rios MD  Internal Medicine/Pediatrics        I, Whitney Zayas, am serving as a scribe to document services personally performed by Rita Rios MD, based on data collection and the provider's statements to me. Rita Rios MD, has reviewed, edited, and approved the above note.     "

## 2021-07-20 NOTE — NURSING NOTE
"41 year old  Chief Complaint   Patient presents with     Derm Problem     mole on left legs.      Gastrointestinal Problem     traveling diarrhea went to [place] from 7/7/2021 thru 7/13/2021 diarrhea started 7/13/2021 on going        Blood pressure 120/75, pulse 80, temperature 97.1  F (36.2  C), temperature source Temporal, height 1.88 m (6' 2\"), weight 86.8 kg (191 lb 4 oz), SpO2 98 %. Body mass index is 24.56 kg/m .  Patient Active Problem List   Diagnosis     Benign neoplasm of skin     History of thyroid disease     Ocular migraine - Both Eyes     Major Depression     Generalized anxiety disorder     Benign prostatic hyperplasia with weak urinary stream       Wt Readings from Last 2 Encounters:   07/20/21 86.8 kg (191 lb 4 oz)   01/11/21 86.3 kg (190 lb 4 oz)     BP Readings from Last 3 Encounters:   07/20/21 120/75   01/11/21 128/82   01/29/18 134/82         Current Outpatient Medications   Medication     alfuzosin ER (UROXATRAL) 10 MG 24 hr tablet     FLUoxetine (PROZAC) 10 MG capsule     ibuprofen (ADVIL) 200 MG tablet     olopatadine (PATADAY) 0.2 % ophthalmic solution     No current facility-administered medications for this visit.       Social History     Tobacco Use     Smoking status: Never Smoker     Smokeless tobacco: Never Used   Substance Use Topics     Alcohol use: Yes     Comment: 4-5 days per week one beer or glass of wine     Drug use: No       Health Maintenance Due   Topic Date Due     PREVENTIVE CARE VISIT  Never done     ADVANCE CARE PLANNING  Never done     DEPRESSION ACTION PLAN  Never done     HEPATITIS C SCREENING  Never done     PHQ-9  07/11/2021       No results found for: PAP      July 20, 2021 10:43 AM  "

## 2021-07-20 NOTE — LETTER
My Depression Action Plan  Name: Asa Mann   Date of Birth 1980  Date: 7/20/2021    My doctor: Rita Rios   My clinic: [address]  [facility] [address]  [phone]          GREEN    ZONE   Good Control    What it looks like:     Things are going generally well. You have normal ups and downs. You may even feel depressed from time to time, but bad moods usually last less than a day.   What you need to do:  1. Continue to care for yourself (see self care plan)  2. Check your depression survival kit and update it as needed  3. Follow your physician s recommendations including any medication.  4. Do not stop taking medication unless you consult with your physician first.           YELLOW         ZONE Getting Worse    What it looks like:     Depression is starting to interfere with your life.     It may be hard to get out of bed; you may be starting to isolate yourself from others.    Symptoms of depression are starting to last most all day and this has happened for several days.     You may have suicidal thoughts but they are not constant.   What you need to do:     1. Call your care team. Your response to treatment will improve if you keep your care team informed of your progress. Yellow periods are signs an adjustment may need to be made.     2. Continue your self-care.  Just get dressed and ready for the day.  Don't give yourself time to talk yourself out of it.    3. Talk to someone in your support network.    4. Open up your Depression Self-Care Plan/Wellness Kit.           RED    ZONE Medical Alert - Get Help    What it looks like:     Depression is seriously interfering with your life.     You may experience these or other symptoms: You can t get out of bed most days, can t work or engage in other necessary activities, you have trouble taking care of basic hygiene, or basic responsibilities, thoughts of suicide  or death that will not go away, self-injurious behavior.     What you need to do:  1. Call your care team and request a same-day appointment. If they are not available (weekends or after hours) call your local crisis line, emergency room or 911.          Depression Self-Care Plan / Wellness Kit    Many people find that medication and therapy are helpful treatments for managing depression. In addition, making small changes to your everyday life can help to boost your mood and improve your wellbeing. Below are some tips for you to consider. Be sure to talk with your medical provider and/or behavioral health consultant if your symptoms are worsening or not improving.     Sleep   Sleep hygiene  means all of the habits that support good, restful sleep. It includes maintaining a consistent bedtime and wake time, using your bedroom only for sleeping or sex, and keeping the bedroom dark and free of distractions like a computer, smartphone, or television.     Develop a Healthy Routine  Maintain good hygiene. Get out of bed in the morning, make your bed, brush your teeth, take a shower, and get dressed. Don t spend too much time viewing media that makes you feel stressed. Find time to relax each day.    Exercise  Get some form of exercise every day. This will help reduce pain and release endorphins, the  feel good  chemicals in your brain. It can be as simple as just going for a walk or doing some gardening, anything that will get you moving.      Diet  Strive to eat healthy foods, including fruits and vegetables. Drink plenty of water. Avoid excessive sugar, caffeine, alcohol, and other mood-altering substances.     Stay Connected with Others  Stay in touch with friends and family members.    Manage Your Mood  Try deep breathing, massage therapy, biofeedback, or meditation. Take part in fun activities when you can. Try to find something to smile about each day.     Psychotherapy  Be open to working with a therapist if your  provider recommends it.     Medication  Be sure to take your medication as prescribed. Most anti-depressants need to be taken every day. It usually takes several weeks for medications to work. Not all medicines work for all people. It is important to follow-up with your provider to make sure you have a treatment plan that is working for you. Do not stop your medication abruptly without first discussing it with your provider.    Crisis Resources   These hotlines are for both adults and children. They and are open 24 hours a day, 7 days a week unless noted otherwise.      National Suicide Prevention Lifeline   5-133-000-FSTV (6857)      Crisis Text Line    www.crisistextline.org  Text HOME to 742363 from anywhere in the [place], anytime, about any type of crisis. A live, trained crisis counselor will receive the text and respond quickly.      [facility] Lifeline for LGBTQ Youth  A national crisis intervention and suicide lifeline for LGBTQ youth under 25. Provides a safe place to talk without judgement. Call 1-330.445.3984; text START to 258679 or visit www.thetrevorproject.org to talk to a trained counselor.      For [facility] crisis numbers, visit the [facility] website at:  https://[place].gov/dhs/people-we-serve/adults/health-care/mental-health/resources/crisis-contacts.jsp

## 2021-07-21 ENCOUNTER — LAB (OUTPATIENT)
Dept: LAB | Facility: CLINIC | Age: 41
End: 2021-07-21
Payer: COMMERCIAL

## 2021-07-21 DIAGNOSIS — A09 DIARRHEA OF INFECTIOUS ORIGIN: ICD-10-CM

## 2021-07-21 ASSESSMENT — ANXIETY QUESTIONNAIRES: GAD7 TOTAL SCORE: 6

## 2021-07-22 LAB
C COLI+JEJUNI+LARI FUSA STL QL NAA+PROBE: NOT DETECTED
C PARVUM AG STL QL IA: NEGATIVE
EC STX1 GENE STL QL NAA+PROBE: NOT DETECTED
EC STX2 GENE STL QL NAA+PROBE: NOT DETECTED
G LAMBLIA AG STL QL IA: NEGATIVE
NOROV GI+II ORF1-ORF2 JNC STL QL NAA+PR: NOT DETECTED
O+P STL MICRO: NEGATIVE
O+P STL MICRO: NEGATIVE
RVA NSP5 STL QL NAA+PROBE: NOT DETECTED
SALMONELLA SP RPOD STL QL NAA+PROBE: NOT DETECTED
SHIGELLA SP+EIEC IPAH STL QL NAA+PROBE: NOT DETECTED
V CHOL+PARA RFBL+TRKH+TNAA STL QL NAA+PR: NOT DETECTED
Y ENTERO RECN STL QL NAA+PROBE: NOT DETECTED

## 2021-09-25 ENCOUNTER — HEALTH MAINTENANCE LETTER (OUTPATIENT)
Age: 41
End: 2021-09-25

## 2021-10-12 ENCOUNTER — OFFICE VISIT (OUTPATIENT)
Dept: FAMILY MEDICINE | Facility: CLINIC | Age: 41
End: 2021-10-12
Payer: COMMERCIAL

## 2021-10-12 VITALS
TEMPERATURE: 97.1 F | WEIGHT: 196.75 LBS | SYSTOLIC BLOOD PRESSURE: 126 MMHG | OXYGEN SATURATION: 99 % | DIASTOLIC BLOOD PRESSURE: 79 MMHG | HEART RATE: 81 BPM | HEIGHT: 74 IN | BODY MASS INDEX: 25.25 KG/M2 | RESPIRATION RATE: 15 BRPM

## 2021-10-12 DIAGNOSIS — Z23 NEEDS FLU SHOT: ICD-10-CM

## 2021-10-12 DIAGNOSIS — L03.113 CELLULITIS OF RIGHT UPPER EXTREMITY: Primary | ICD-10-CM

## 2021-10-12 DIAGNOSIS — Z23 NEED FOR PROPHYLACTIC VACCINATION AND INOCULATION AGAINST INFLUENZA: ICD-10-CM

## 2021-10-12 RX ORDER — CEPHALEXIN 500 MG/1
500 CAPSULE ORAL 3 TIMES DAILY
Qty: 21 CAPSULE | Refills: 0 | Status: SHIPPED | OUTPATIENT
Start: 2021-10-12 | End: 2022-01-20

## 2021-10-12 ASSESSMENT — MIFFLIN-ST. JEOR: SCORE: 1877.2

## 2021-10-12 NOTE — NURSING NOTE
"41 year old  Chief Complaint   Patient presents with     Insect Bites     bug bite on right arm, not getting better      Flu Shot       Blood pressure 126/79, pulse 81, temperature 97.1  F (36.2  C), temperature source Skin, resp. rate 15, height 1.88 m (6' 2\"), weight 89.2 kg (196 lb 12 oz), SpO2 99 %. Body mass index is 25.26 kg/m .  Patient Active Problem List   Diagnosis     Benign neoplasm of skin     History of thyroid disease     Ocular migraine - Both Eyes     Major Depression     Generalized anxiety disorder     Benign prostatic hyperplasia with weak urinary stream       Wt Readings from Last 2 Encounters:   10/12/21 89.2 kg (196 lb 12 oz)   07/20/21 86.8 kg (191 lb 4 oz)     BP Readings from Last 3 Encounters:   10/12/21 126/79   07/20/21 120/75   01/11/21 128/82         Current Outpatient Medications   Medication     alfuzosin ER (UROXATRAL) 10 MG 24 hr tablet     FLUoxetine (PROZAC) 10 MG capsule     ibuprofen (ADVIL) 200 MG tablet     olopatadine (PATADAY) 0.2 % ophthalmic solution     No current facility-administered medications for this visit.       Social History     Tobacco Use     Smoking status: Never Smoker     Smokeless tobacco: Never Used   Substance Use Topics     Alcohol use: Yes     Comment: 4-5 days per week one beer or glass of wine     Drug use: No       Health Maintenance Due   Topic Date Due     PREVENTIVE CARE VISIT  Never done     ADVANCE CARE PLANNING  Never done     HEPATITIS C SCREENING  Never done     INFLUENZA VACCINE (1) 09/01/2021       No results found for: PAP      October 12, 2021 11:12 AM    "

## 2021-10-12 NOTE — PROGRESS NOTES
SUBJECTIVE:   Asa Mann is a 41 year old male who presents to clinic today to discuss the following problem(s).    Infected bug bite  - thinks he was bitten 2-3 days ago  - one bite on his RIGHT shoulder is healing nicely  - the other bite on his RIGHT bicep continues to feel itchy and there is a slowly spreading area of redness around the original lesion  - denies fever, chills, nausea, body aches, fatigue     ROS: 10 point ROS neg other than the symptoms noted above in the HPI.      Today's PHQ-2:  PHQ-2 ( 1999 Pfizer) 10/12/2021   Q1: Little interest or pleasure in doing things 0   Q2: Feeling down, depressed or hopeless 0   PHQ-2 Score 0       Past Medical History:   Diagnosis Date     Anxiety      Snoring      Past Surgical History:   Procedure Laterality Date     APPENDECTOMY  2009     Family History   Problem Relation Age of Onset     Glaucoma Mother      EYE* Mother         retinal bleed     Other - See Comments Mother         Lymes     Diabetes Father      Thyroid Disease Father      Glaucoma Maternal Grandmother      Macular Degeneration Paternal Grandmother      Thyroid Disease Other         dad, pat aunt     Thyroid Disease Sister         hashimoto     Glaucoma Other         maternal uncles     EYE* Other         ret bleed/ mat uncle     Other - See Comments Other      Social History     Tobacco Use     Smoking status: Never Smoker     Smokeless tobacco: Never Used   Substance Use Topics     Alcohol use: Yes     Comment: 4-5 days per week one beer or glass of wine     Drug use: No     Social History     Social History Narrative     Not on file       Current Outpatient Medications   Medication     alfuzosin ER (UROXATRAL) 10 MG 24 hr tablet     FLUoxetine (PROZAC) 10 MG capsule     ibuprofen (ADVIL) 200 MG tablet     olopatadine (PATADAY) 0.2 % ophthalmic solution     No current facility-administered medications for this visit.     I have reviewed the patient's past medical, surgical, family, and  "social history.     OBJECTIVE:   /79 (BP Location: Left arm, Patient Position: Sitting, Cuff Size: Adult Regular)   Pulse 81   Temp 97.1  F (36.2  C) (Skin)   Resp 15   Ht 1.88 m (6' 2\")   Wt 89.2 kg (196 lb 12 oz)   SpO2 99%   BMI 25.26 kg/m      Constitutional: well-appearing, appears stated age  Eyes: conjunctivae without erythema, sclera anicteric.   Cardiac: regular rate and rhythm, normal S1/S2, no murmur/rubs/gallops  Respiratory: lungs clear to auscultation bilaterally, normal work of breathing, no wheezes/crackles  Skin: 1-2mm bite lesion on RIGHT bicep with surrounding erythema approximately 8-10 cm circumference. Minimal warmth or swelling. No bleeding or drainage at bite site  Psych: affect is full and appropriate, speech is fluent and non-pressured    ASSESSMENT AND PLAN:     Asa was seen today for insect bites, flu shot and imm/inj.    Diagnoses and all orders for this visit:    Cellulitis of right upper extremity  -     cephALEXin (KEFLEX) 500 MG capsule; Take 1 capsule (500 mg) by mouth 3 times daily    Needs flu shot  -     Cancel: INFLUENZA QUAD, RECOMBINANT, P-FREE (RIV4) (FLUBLOK)    Need for prophylactic vaccination and inoculation against influenza  -     INFLUENZA VACCINE IM > 6 MONTHS VALENT IIV4 (AFLURIA/FLUZONE)    Spreading erythema vs expanding area of dermatitis around initial bite. Will treat for possible skin infection today as noted above and monitor for improvement.       Jesus Chi MD  [facility]  10/12/2021, 11:14 AM    "

## 2021-10-12 NOTE — NURSING NOTE
"Injectable Influenza Immunization Documentation    1.  Has the patient received the information for the injectable influenza vaccine? YES     2. Is the patient 6 months of age or older? YES     3. Does the patient have any of the following contraindications?         Severe allergy to eggs? No     Severe allergic reaction to previous influenza vaccines? No   Severe allergy to latex? No       History of Guillain-White syndrome? No     Currently have a temperature greater than 100.4F? No        4.  Severely egg allergic patients should have flu vaccine eligibility assessed by an MD, RN, or pharmacist, and those who received flu vaccine should be observed for 15 min by an MD, RN, Pharmacist, Medical Technician, or member of clinic staff.\": YES    5. Latex-allergic patients should be given latex-free influenza vaccine Yes. Please reference the Vaccine latex table to determine if your clinic s product is latex-containing.       Vaccination given by Rosey Dobson CMA,[facility]  October 12, 2021 11:35 AM                "

## 2022-01-09 ENCOUNTER — HEALTH MAINTENANCE LETTER (OUTPATIENT)
Age: 42
End: 2022-01-09

## 2022-01-20 ENCOUNTER — OFFICE VISIT (OUTPATIENT)
Dept: FAMILY MEDICINE | Facility: CLINIC | Age: 42
End: 2022-01-20
Payer: COMMERCIAL

## 2022-01-20 ENCOUNTER — MYC MEDICAL ADVICE (OUTPATIENT)
Dept: FAMILY MEDICINE | Facility: CLINIC | Age: 42
End: 2022-01-20

## 2022-01-20 VITALS
TEMPERATURE: 98.2 F | HEART RATE: 109 BPM | DIASTOLIC BLOOD PRESSURE: 79 MMHG | BODY MASS INDEX: 25.42 KG/M2 | SYSTOLIC BLOOD PRESSURE: 126 MMHG | HEIGHT: 74 IN | WEIGHT: 198.04 LBS | OXYGEN SATURATION: 100 %

## 2022-01-20 DIAGNOSIS — Z23 REQUIRES TYPHOID VACCINATION: ICD-10-CM

## 2022-01-20 DIAGNOSIS — R19.7 DIARRHEA, UNSPECIFIED TYPE: Primary | ICD-10-CM

## 2022-01-20 DIAGNOSIS — N40.1 BENIGN PROSTATIC HYPERPLASIA WITH WEAK URINARY STREAM: ICD-10-CM

## 2022-01-20 DIAGNOSIS — F41.1 GENERALIZED ANXIETY DISORDER: ICD-10-CM

## 2022-01-20 DIAGNOSIS — Z23 NEED FOR HEPATITIS VACCINATION: ICD-10-CM

## 2022-01-20 DIAGNOSIS — R39.12 BENIGN PROSTATIC HYPERPLASIA WITH WEAK URINARY STREAM: ICD-10-CM

## 2022-01-20 DIAGNOSIS — R19.5 LOOSE STOOLS: Primary | ICD-10-CM

## 2022-01-20 RX ORDER — ALFUZOSIN HYDROCHLORIDE 10 MG/1
10 TABLET, EXTENDED RELEASE ORAL DAILY
Qty: 90 TABLET | Refills: 3 | Status: SHIPPED | OUTPATIENT
Start: 2022-01-20 | End: 2023-01-31

## 2022-01-20 RX ORDER — FLUOXETINE 10 MG/1
10 CAPSULE ORAL DAILY
Qty: 90 CAPSULE | Refills: 3 | Status: SHIPPED | OUTPATIENT
Start: 2022-01-20 | End: 2023-01-31

## 2022-01-20 ASSESSMENT — MIFFLIN-ST. JEOR: SCORE: 1883.3

## 2022-01-20 ASSESSMENT — ANXIETY QUESTIONNAIRES
GAD7 TOTAL SCORE: 3
6. BECOMING EASILY ANNOYED OR IRRITABLE: SEVERAL DAYS
7. FEELING AFRAID AS IF SOMETHING AWFUL MIGHT HAPPEN: NOT AT ALL
3. WORRYING TOO MUCH ABOUT DIFFERENT THINGS: NOT AT ALL
5. BEING SO RESTLESS THAT IT IS HARD TO SIT STILL: NOT AT ALL
2. NOT BEING ABLE TO STOP OR CONTROL WORRYING: NOT AT ALL
1. FEELING NERVOUS, ANXIOUS, OR ON EDGE: SEVERAL DAYS
IF YOU CHECKED OFF ANY PROBLEMS ON THIS QUESTIONNAIRE, HOW DIFFICULT HAVE THESE PROBLEMS MADE IT FOR YOU TO DO YOUR WORK, TAKE CARE OF THINGS AT HOME, OR GET ALONG WITH OTHER PEOPLE: SOMEWHAT DIFFICULT

## 2022-01-20 ASSESSMENT — PATIENT HEALTH QUESTIONNAIRE - PHQ9
SUM OF ALL RESPONSES TO PHQ QUESTIONS 1-9: 2
5. POOR APPETITE OR OVEREATING: SEVERAL DAYS

## 2022-01-20 NOTE — PATIENT INSTRUCTIONS
Fiber supplement daily (powder)  Probiotic daily    ?asymptomatic typhoid  Watch my chart message      Nasal spray  Flonase 2 spray daily in to each nostril

## 2022-01-20 NOTE — PROGRESS NOTES
"Asa Mann is a 41 year old male here for the following issues:    URI symptoms  Asa reports becoming ill with sinus pressure and headaches 21. Went to a family [facility] party, and many family members tested positive for COVID-19.  Asa took 4 PCR tests over the course of a week that were all negative.  He was vaccinated with Moderna, 3/3 completed.  Initial symptoms were a \"wet nose\", no significant congestion or rhinorrhea.  Endorsed headache, fatigue, and occasional shortness of breath. No fever, chills, or body aches.     Today, Asa reports that he has had some residual congestion and pressure over his sinuses. Has a daily headache localized in his forehead/over his sinuses. No productive cough. No purulent discharge, secretions are clear.     Diarrhea  Asa contracted traveler's diarrhea after visit to [place] in 2021. He was experiencing nausea, vomiting while on the plane, difficulty keeping food or water down. Denied cramping or hematochezia. When the diarrhea started, he was having about 10 BMs per day. Was still having 2-3 BMs per day at our visit on 21. Stool cultures were negative at that time. No antibiotics started.    Today, Asa reports that he has continues to have loose, \"messy/mushy\" stools a couple of days per week every week since onset. He has had two instances of \"extreme\" fecal urgency followed by watery diarrhea. No painful cramping. He has been eating yogurt, not taking a probiotic. Wondering about a new dairy sensitivity. Concerned about possible typhoid infection. Had a typhoid vaccine in , since .    Traveling to [place] in 2022 and interested in getting relevant vaccinations. Due for Typhoid and third HepB vaccine.      Depression and anxiety  Asa takes fluoxetine 10 mg daily for treatment of depression and anxiety. Mood is doing very well, reports that he had a \"whirlwind year\" in  but that it was good. Drinking 1 " "beer/day. Sleep is good.     PHQ 12/21/2017 1/11/2021 7/20/2021   PHQ-9 Total Score 3 4 3   Q9: Thoughts of better off dead/self-harm past 2 weeks Not at all Not at all Not at all     NAVYA-7 SCORE 12/21/2017 1/11/2021 7/20/2021   Total Score 7 3 6        Patient Active Problem List   Diagnosis     Benign neoplasm of skin     History of thyroid disease     Ocular migraine - Both Eyes     Major Depression     Generalized anxiety disorder     Benign prostatic hyperplasia with weak urinary stream       Current Outpatient Medications   Medication Sig Dispense Refill     alfuzosin ER (UROXATRAL) 10 MG 24 hr tablet Take 1 tablet (10 mg) by mouth daily 90 tablet 3     FLUoxetine (PROZAC) 10 MG capsule Take 1 capsule (10 mg) by mouth daily 90 capsule 3     ibuprofen (ADVIL) 200 MG tablet Take 200 mg by mouth every 4 hours as needed for mild pain       cephALEXin (KEFLEX) 500 MG capsule Take 1 capsule (500 mg) by mouth 3 times daily 21 capsule 0     olopatadine (PATADAY) 0.2 % ophthalmic solution Place 1 drop into both eyes daily (Patient not taking: Reported on 7/20/2021) 1 mL 3       Allergies   Allergen Reactions     Amoxicillin Other (See Comments)     Tops of legs turned red, hot and itchy     Zithromax [Azithromycin Dihydrate] Diarrhea     Penicillins Rash        EXAM  /79   Pulse 109   Temp 98.2  F (36.8  C)   Ht 1.88 m (6' 2.02\")   Wt 89.8 kg (198 lb 0.6 oz)   SpO2 100%   BMI 25.42 kg/m    Gen: Alert, pleasant, NAD  HEENT: point tenderness with palpation over mid forehead  Neck without LAD  Remainder of exam deferred.       Assessment:  (R19.5) Loose stools  (primary encounter diagnosis)  Comment: onset in July 2021 on plane ride back from [place], stool testing was negative, symptoms have improved but diarrhea/loose stools have continued  Plan: Recommended daily powdered fiber supplement and a daily probiotic. Discussed option to obtain new set of stool cultures if symptoms not improving. He was concerned " about carrier state for Typhoid however this should be picked up in stool cultures.   Will order future stool cultures, routine + giardia tests.  Will offer referral to GI if symptoms persist    (F41.1) Generalized anxiety disorder  Comment: mood is very good  Plan: FLUoxetine (PROZAC) 10 MG capsule        Refilled x 1 year.    (N40.1,  R39.12) Benign prostatic hyperplasia with weak urinary stream  Comment: doing well, no current symptoms  Plan: alfuzosin ER (UROXATRAL) 10 MG 24 hr tablet        Refilled x 1 year.     (Z23) Requires typhoid vaccination  Comment: upcoming travel to [place], last typhoid vaccine administered in 2008  Plan: TYPHOID VACCINE, IM        Given today.    (Z23) Need for hepatitis vaccination  Comment: due for third HepB vaccine, has upcoming travel to [place]  Plan: HEPATITIS B VACCINE,ADULT,IM        Given today.    66 minutes spend on the date of this encounter doing chart review, history and exam, documentation and further activities as noted above.     Rita Rios MD  Internal Medicine/Pediatrics      I, Denise Rosales, am serving as a scribe to document services personally performed by Dr. Rita Rios, based on data collection and the provider's statements to me. Dr. Rios has reviewed, edited, and approved the above note.

## 2022-01-20 NOTE — NURSING NOTE
"41 year old  Chief Complaint   Patient presents with     Sinus Problem     since about 12/23/2021 pt has been having sinus pressure and headaches. Covid test was negative.     Diarrhea     pt still has this going on from a trip he took to [place] in 04/2021.     Recheck Medication       Blood pressure 126/79, pulse 109, temperature 98.2  F (36.8  C), height 1.88 m (6' 2.02\"), weight 89.8 kg (198 lb 0.6 oz), SpO2 100 %. Body mass index is 25.42 kg/m .  Patient Active Problem List   Diagnosis     Benign neoplasm of skin     History of thyroid disease     Ocular migraine - Both Eyes     Major Depression     Generalized anxiety disorder     Benign prostatic hyperplasia with weak urinary stream       Wt Readings from Last 2 Encounters:   01/20/22 89.8 kg (198 lb 0.6 oz)   10/12/21 89.2 kg (196 lb 12 oz)     BP Readings from Last 3 Encounters:   01/20/22 126/79   10/12/21 126/79   07/20/21 120/75         Current Outpatient Medications   Medication     alfuzosin ER (UROXATRAL) 10 MG 24 hr tablet     FLUoxetine (PROZAC) 10 MG capsule     ibuprofen (ADVIL) 200 MG tablet     cephALEXin (KEFLEX) 500 MG capsule     olopatadine (PATADAY) 0.2 % ophthalmic solution     No current facility-administered medications for this visit.       Social History     Tobacco Use     Smoking status: Never Smoker     Smokeless tobacco: Never Used   Substance Use Topics     Alcohol use: Yes     Comment: 4-5 days per week one beer or glass of wine     Drug use: No       Health Maintenance Due   Topic Date Due     PREVENTIVE CARE VISIT  Never done     ADVANCE CARE PLANNING  Never done     HEPATITIS C SCREENING  Never done     PHQ-9  01/20/2022       No results found for: PAP      January 20, 2022 2:54 PM  "

## 2022-01-20 NOTE — NURSING NOTE
Prior to immunization administration, verified patients identity using patient s name and date of birth. Please see Immunization Activity for additional information.     Screening Questionnaire for Adult Immunization    Are you sick today?   No   Do you have allergies to medications, food, a vaccine component or latex?   No   Have you ever had a serious reaction after receiving a vaccination?   No   Do you have a long-term health problem with heart, lung, kidney, or metabolic disease (e.g., diabetes), asthma, a blood disorder, no spleen, complement component deficiency, a cochlear implant, or a spinal fluid leak?  Are you on long-term aspirin therapy?   No   Do you have cancer, leukemia, HIV/AIDS, or any other immune system problem?   No   Do you have a parent, brother, or sister with an immune system problem?   No   In the past 3 months, have you taken medications that affect  your immune system, such as prednisone, other steroids, or anticancer drugs; drugs for the treatment of rheumatoid arthritis, Crohn s disease, or psoriasis; or have you had radiation treatments?   No   Have you had a seizure, or a brain or other nervous system problem?   No   During the past year, have you received a transfusion of blood or blood    products, or been given immune (gamma) globulin or antiviral drug?   No   For women: Are you pregnant or is there a chance you could become       pregnant during the next month?   No   Have you received any vaccinations in the past 4 weeks?   No     Immunization questionnaire answers were all negative.        Per orders of Dr. Rios, injection of Typhoid and Hepatitis B given by Joann Cline MA. Patient instructed to remain in clinic for 15 minutes afterwards, and to report any adverse reaction to me immediately.       Screening performed by Joann Cline MA on 1/20/2022 at 3:36 PM.

## 2022-01-21 ASSESSMENT — ANXIETY QUESTIONNAIRES: GAD7 TOTAL SCORE: 3

## 2022-01-23 DIAGNOSIS — Z13.220 SCREENING FOR HYPERLIPIDEMIA: Primary | ICD-10-CM

## 2022-01-23 DIAGNOSIS — Z13.29 SCREENING FOR THYROID DISORDER: ICD-10-CM

## 2022-02-08 NOTE — PROGRESS NOTES
Patient roomed by other staff.  Shon  [facility]      Answers for HPI/ROS submitted by the patient on 2/9/2022  General Symptoms: No  Skin Symptoms: No  HENT Symptoms: No  EYE SYMPTOMS: No  HEART SYMPTOMS: No  LUNG SYMPTOMS: No  INTESTINAL SYMPTOMS: Yes  URINARY SYMPTOMS: No  REPRODUCTIVE SYMPTOMS: No  SKELETAL SYMPTOMS: No  BLOOD SYMPTOMS: No  NERVOUS SYSTEM SYMPTOMS: No  MENTAL HEALTH SYMPTOMS: No  Heart burn or indigestion: No  Nausea: No  Vomiting: No  Abdominal pain: Yes  Bloating: Yes  Constipation: No  Diarrhea: Yes  Blood in stool: No  Black stools: No  Rectal or Anal pain: Yes  Fecal incontinence: No  Yellowing of skin or eyes: No  Vomit with blood: No  Change in stools: Yes

## 2022-02-09 ENCOUNTER — VIRTUAL VISIT (OUTPATIENT)
Dept: GASTROENTEROLOGY | Facility: CLINIC | Age: 42
End: 2022-02-09
Attending: INTERNAL MEDICINE
Payer: COMMERCIAL

## 2022-02-09 DIAGNOSIS — R19.7 DIARRHEA, UNSPECIFIED TYPE: ICD-10-CM

## 2022-02-09 PROCEDURE — 99203 OFFICE O/P NEW LOW 30 MIN: CPT | Mod: 95 | Performed by: INTERNAL MEDICINE

## 2022-02-09 ASSESSMENT — ENCOUNTER SYMPTOMS
JAUNDICE: 0
NAUSEA: 0
CONSTIPATION: 0
VOMITING: 0
RECTAL PAIN: 1
BOWEL INCONTINENCE: 0
DIARRHEA: 1
ABDOMINAL PAIN: 1
BLOATING: 1
BLOOD IN STOOL: 0
HEARTBURN: 0

## 2022-02-09 NOTE — PROGRESS NOTES
Gastroenterology consult    Video with trouble with audio, first feedback to patient, then no audio at all, changed to phone.    40 yo male with illness after wedding in [place] July 2021.  Wife ill also but recovered. Pt has fever, diarrhea, no cramps, maliase.  Resolved after 2 days with brat diet.  Tested for infectious causes including parasites, negative.  Reordered tests, not yet submitted.    Stools now 1x per month normal.  Most times semi-formed, sometimes urgent.  Gassy upper abd by ribs, Last 3-4 wks fiber and [employer] probiotic helpful, perhaps 2 stools per day.  Had trouble with salad recently.  No alarm.  No nocturnal.  Diet: yoghurt worsens, but cheese ok. Not gluten free.  No honey, no agave.    No reflux sx.    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 Less formed stools but frequency not increased. 7 months symptoms.  Parasitic testing unlikely to abnormal with repeat.  D/dx includes med effect with selective serotonin reuptake inhibitor, abx with keflex Rx.  No infectious agent found. Pt concern about S.typhi.      Plan: Suggest labs with  TTG,  TSH for completeness.    Offered prilosec for upper rib symptoms, pt declines.    Continue fiber.  Official GI society unclear which probiotics are best.  Offered colonoscopy with bx for microscopic colitis, but pt happy with results of current treatment.  Offered review of low FODMAPs with fruit and sweeteners ([email or web address]) [facility].    As needed return.  Telephone time equals 20 min.

## 2022-02-17 ENCOUNTER — MYC MEDICAL ADVICE (OUTPATIENT)
Dept: FAMILY MEDICINE | Facility: CLINIC | Age: 42
End: 2022-02-17
Payer: COMMERCIAL

## 2022-02-17 DIAGNOSIS — R51.9 SINUS HEADACHE: Primary | ICD-10-CM

## 2022-02-18 ENCOUNTER — OFFICE VISIT (OUTPATIENT)
Dept: FAMILY MEDICINE | Facility: CLINIC | Age: 42
End: 2022-02-18
Payer: COMMERCIAL

## 2022-02-18 VITALS
DIASTOLIC BLOOD PRESSURE: 71 MMHG | WEIGHT: 198.75 LBS | OXYGEN SATURATION: 98 % | TEMPERATURE: 96.9 F | HEART RATE: 87 BPM | BODY MASS INDEX: 25.51 KG/M2 | SYSTOLIC BLOOD PRESSURE: 116 MMHG | HEIGHT: 74 IN

## 2022-02-18 DIAGNOSIS — R19.5 LOOSE STOOLS: ICD-10-CM

## 2022-02-18 DIAGNOSIS — Z13.220 LIPID SCREENING: ICD-10-CM

## 2022-02-18 DIAGNOSIS — R00.2 PALPITATIONS: Primary | ICD-10-CM

## 2022-02-18 LAB
ANION GAP SERPL CALCULATED.3IONS-SCNC: 3 MMOL/L (ref 3–14)
BUN SERPL-MCNC: 15 MG/DL (ref 7–30)
CALCIUM SERPL-MCNC: 9.6 MG/DL (ref 8.5–10.1)
CHLORIDE BLD-SCNC: 104 MMOL/L (ref 94–109)
CHOLEST SERPL-MCNC: 262 MG/DL
CO2 SERPL-SCNC: 33 MMOL/L (ref 20–32)
CREAT SERPL-MCNC: 1.01 MG/DL (ref 0.66–1.25)
FASTING STATUS PATIENT QL REPORTED: ABNORMAL
GFR SERPL CREATININE-BSD FRML MDRD: >90 ML/MIN/1.73M2
GLUCOSE BLD-MCNC: 92 MG/DL (ref 70–99)
HDLC SERPL-MCNC: 47 MG/DL
LDLC SERPL CALC-MCNC: 185 MG/DL
NONHDLC SERPL-MCNC: 215 MG/DL
POTASSIUM BLD-SCNC: 4.5 MMOL/L (ref 3.4–5.3)
SODIUM SERPL-SCNC: 140 MMOL/L (ref 133–144)
TRIGL SERPL-MCNC: 152 MG/DL
TSH SERPL DL<=0.005 MIU/L-ACNC: 1.53 MU/L (ref 0.4–4)

## 2022-02-18 PROCEDURE — 80048 BASIC METABOLIC PNL TOTAL CA: CPT | Performed by: INTERNAL MEDICINE

## 2022-02-18 PROCEDURE — 80061 LIPID PANEL: CPT | Performed by: INTERNAL MEDICINE

## 2022-02-18 PROCEDURE — 84443 ASSAY THYROID STIM HORMONE: CPT | Performed by: INTERNAL MEDICINE

## 2022-02-18 PROCEDURE — 86364 TISS TRNSGLTMNASE EA IG CLAS: CPT | Performed by: INTERNAL MEDICINE

## 2022-02-18 RX ORDER — FLUTICASONE PROPIONATE 50 MCG
SPRAY, SUSPENSION (ML) NASAL
COMMUNITY
Start: 2022-01-26 | End: 2023-03-05

## 2022-02-18 ASSESSMENT — PATIENT HEALTH QUESTIONNAIRE - PHQ9
5. POOR APPETITE OR OVEREATING: SEVERAL DAYS
SUM OF ALL RESPONSES TO PHQ QUESTIONS 1-9: 2

## 2022-02-18 ASSESSMENT — ANXIETY QUESTIONNAIRES
IF YOU CHECKED OFF ANY PROBLEMS ON THIS QUESTIONNAIRE, HOW DIFFICULT HAVE THESE PROBLEMS MADE IT FOR YOU TO DO YOUR WORK, TAKE CARE OF THINGS AT HOME, OR GET ALONG WITH OTHER PEOPLE: SOMEWHAT DIFFICULT
5. BEING SO RESTLESS THAT IT IS HARD TO SIT STILL: SEVERAL DAYS
6. BECOMING EASILY ANNOYED OR IRRITABLE: NOT AT ALL
3. WORRYING TOO MUCH ABOUT DIFFERENT THINGS: NOT AT ALL
1. FEELING NERVOUS, ANXIOUS, OR ON EDGE: NOT AT ALL
7. FEELING AFRAID AS IF SOMETHING AWFUL MIGHT HAPPEN: NOT AT ALL
2. NOT BEING ABLE TO STOP OR CONTROL WORRYING: NOT AT ALL
GAD7 TOTAL SCORE: 2

## 2022-02-18 NOTE — NURSING NOTE
"42 year old  Chief Complaint   Patient presents with     Palpitations     heart palpitations x 2 day ago /  lasting for 3 hr        Blood pressure 116/71, pulse 87, temperature 96.9  F (36.1  C), temperature source Temporal, height 1.88 m (6' 2\"), weight 90.2 kg (198 lb 12 oz), SpO2 98 %. Body mass index is 25.52 kg/m .  Patient Active Problem List   Diagnosis     Benign neoplasm of skin     History of thyroid disease     Ocular migraine - Both Eyes     Major Depression     Generalized anxiety disorder     Benign prostatic hyperplasia with weak urinary stream       Wt Readings from Last 2 Encounters:   02/18/22 90.2 kg (198 lb 12 oz)   01/20/22 89.8 kg (198 lb 0.6 oz)     BP Readings from Last 3 Encounters:   02/18/22 116/71   01/20/22 126/79   10/12/21 126/79         Current Outpatient Medications   Medication     alfuzosin ER (UROXATRAL) 10 MG 24 hr tablet     FLUoxetine (PROZAC) 10 MG capsule     ibuprofen (ADVIL) 200 MG tablet     Probiotic Product (PROBIOTIC-10 PO)     psyllium (METAMUCIL/KONSYL) capsule     fluticasone (FLONASE) 50 MCG/ACT nasal spray     No current facility-administered medications for this visit.       Social History     Tobacco Use     Smoking status: Never Smoker     Smokeless tobacco: Never Used   Substance Use Topics     Alcohol use: Yes     Comment: 4-5 days per week one beer or glass of wine     Drug use: No       Health Maintenance Due   Topic Date Due     PREVENTIVE CARE VISIT  Never done     ADVANCE CARE PLANNING  Never done     HEPATITIS C SCREENING  Never done       No results found for: PAP      February 18, 2022 9:20 AM  "

## 2022-02-18 NOTE — PROGRESS NOTES
"Asa Mann is a 42 year old male with history of depression, anxiety, BPH,and ocular migraines. Here for the following issues:    Palpitations   Rich is a healthy 42-year-old male who presents for evaluation of palpitations. Three days ago, at rest, he noted skipped beats, happening every few seconds.  No associated with dizziness chest pain pressure or sweating.  Initially experienced a \"flip-flop, and a tickle\" in his upper chest.  This occurred over a period of 3 hours and then stopped.  He had an afternoon coffee which is not unusual, just prior to the event.  He recalls having a similar sensation about 1 year ago, which was fleeting.       Tob: none  Etoh 1-2 /day  Caffeine : 2 per day  No formal exercise program  Sleep is good, mental health is stable.    His previous history is significant for thyroiditis, noted on RAIU in 2005. No treatment was indicated.  Father and sister have thyroid disease.     Pertinent ROS:   stable weight  Loose stools thought to be post infectious irritable bowel, responding to fiber supplements  No insomnia or sweats    PHQ 7/20/2021 1/20/2022 2/18/2022   PHQ-9 Total Score 3 2 2   Q9: Thoughts of better off dead/self-harm past 2 weeks Not at all Not at all Not at all     NAVYA-7 SCORE 7/20/2021 1/20/2022 2/18/2022   Total Score 6 3 2       Patient Active Problem List   Diagnosis     Benign neoplasm of skin     History of thyroid disease     Ocular migraine - Both Eyes     Major Depression     Generalized anxiety disorder     Benign prostatic hyperplasia with weak urinary stream       Current Outpatient Medications   Medication Sig Dispense Refill     alfuzosin ER (UROXATRAL) 10 MG 24 hr tablet Take 1 tablet (10 mg) by mouth daily 90 tablet 3     FLUoxetine (PROZAC) 10 MG capsule Take 1 capsule (10 mg) by mouth daily 90 capsule 3     ibuprofen (ADVIL) 200 MG tablet Take 200 mg by mouth every 4 hours as needed for mild pain         Allergies   Allergen Reactions     Amoxicillin " "Other (See Comments)     Tops of legs turned red, hot and itchy     Zithromax [Azithromycin Dihydrate] Diarrhea     Penicillins Rash        EXAM  /71 (BP Location: Right arm, Patient Position: Sitting, Cuff Size: Adult Large)   Pulse 87   Temp 96.9  F (36.1  C) (Temporal)   Ht 1.88 m (6' 2\")   Wt 90.2 kg (198 lb 12 oz)   SpO2 98%   BMI 25.52 kg/m    Gen: Alert, pleasant, NAD  HEENT: neck without LAD or mass  COR: S1,S2, no murmur, no ectopy  Lungs: CTA bilaterally, no rhonchi, wheezes or rales  Neuro: DTR +2/4 in all extremities    EKG: (February 18, 2022)  NSR, rate 78, no ST-T changes. No delta waves, no LVH, read by Rita Rios MD  Internal Medicine/Pediatrics    Assessment:  (R00.2) Palpitations  (primary encounter diagnosis)  Comment: benign palpitations, suspect PAC or PVCs  Plan: EKG 12-lead complete w/read - Clinics, Basic         metabolic panel, TSH with free T4 reflex        Discussed benign nature, monitor for red flag symptoms. Stay hydrated. Contact me if symptoms changing. No further cardiac testing indicated at this time.     (R19.5) Loose stools  Comment: post infectious loose stools, responding to treatment with daily fiber  Plan: Tissue transglutaminase antibody IgA        Will check TSH and TTG to rule out underlying cause. Will forward results to EDENILSON Brown    (Z13.490) Lipid screening  Comment: fasting today, hx of elevated lipids, eats meat, wide variety of foods  Recent Labs   Lab Test 02/18/22  0952 08/07/17  0830   CHOL 262* 240.0*   HDL 47 48.0   * 160.0*   TRIG 152* 160.0*   CHOLHDLRATIO  --  5.0     Plan: Lipid Profile        Numbers higher than last time. Recommend modification of diet. Offered referral to see our nutritionist at [place] if insurance will cover.    Rita Rios MD  Internal Medicine/Pediatrics    "

## 2022-02-19 ASSESSMENT — ANXIETY QUESTIONNAIRES: GAD7 TOTAL SCORE: 2

## 2022-02-21 LAB — TTG IGA SER-ACNC: 0.8 U/ML

## 2022-03-01 ENCOUNTER — TELEPHONE (OUTPATIENT)
Dept: DERMATOLOGY | Facility: CLINIC | Age: 42
End: 2022-03-01
Payer: COMMERCIAL

## 2022-03-08 ENCOUNTER — OFFICE VISIT (OUTPATIENT)
Dept: FAMILY MEDICINE | Facility: CLINIC | Age: 42
End: 2022-03-08
Payer: COMMERCIAL

## 2022-03-08 VITALS
WEIGHT: 195 LBS | OXYGEN SATURATION: 98 % | SYSTOLIC BLOOD PRESSURE: 118 MMHG | TEMPERATURE: 98.2 F | RESPIRATION RATE: 12 BRPM | HEART RATE: 93 BPM | BODY MASS INDEX: 25.04 KG/M2 | DIASTOLIC BLOOD PRESSURE: 81 MMHG

## 2022-03-08 DIAGNOSIS — A09 TRAVELER'S DIARRHEA: Primary | ICD-10-CM

## 2022-03-08 RX ORDER — LEVOFLOXACIN 750 MG/1
750 TABLET, FILM COATED ORAL DAILY
Qty: 1 TABLET | Refills: 0 | Status: SHIPPED | OUTPATIENT
Start: 2022-03-08 | End: 2022-06-29

## 2022-03-08 NOTE — NURSING NOTE
42 year old  Chief Complaint   Patient presents with     Diarrhea     x 8 days happening 1-3 times per day       Blood pressure 118/81, pulse 93, temperature 98.2  F (36.8  C), temperature source Skin, resp. rate 12, weight 88.5 kg (195 lb), SpO2 98 %. Body mass index is 25.04 kg/m .  Patient Active Problem List   Diagnosis     Benign neoplasm of skin     History of thyroid disease     Ocular migraine - Both Eyes     Major Depression     Generalized anxiety disorder     Benign prostatic hyperplasia with weak urinary stream       Wt Readings from Last 2 Encounters:   03/08/22 88.5 kg (195 lb)   02/18/22 90.2 kg (198 lb 12 oz)     BP Readings from Last 3 Encounters:   03/08/22 118/81   02/18/22 116/71   01/20/22 126/79         Current Outpatient Medications   Medication     alfuzosin ER (UROXATRAL) 10 MG 24 hr tablet     FLUoxetine (PROZAC) 10 MG capsule     Probiotic Product (PROBIOTIC-10 PO)     psyllium (METAMUCIL/KONSYL) capsule     fluticasone (FLONASE) 50 MCG/ACT nasal spray     ibuprofen (ADVIL) 200 MG tablet     No current facility-administered medications for this visit.       Social History     Tobacco Use     Smoking status: Never Smoker     Smokeless tobacco: Never Used   Substance Use Topics     Alcohol use: Yes     Comment: 4-5 days per week one beer or glass of wine     Drug use: No       Health Maintenance Due   Topic Date Due     PREVENTIVE CARE VISIT  Never done     ADVANCE CARE PLANNING  Never done     HEPATITIS C SCREENING  Never done       No results found for: PAP      March 8, 2022 3:53 PM

## 2022-03-08 NOTE — PROGRESS NOTES
"  Assessment & Plan   Problem List Items Addressed This Visit     None      Visit Diagnoses     Traveler's diarrhea    -  Primary    Relevant Medications    levofloxacin (LEVAQUIN) 750 MG tablet        Given charted history of adverse reaction to azithromycin, will write for levo instead. If symptoms should fail to improve over the weekend I would ask Asa to come in next week to  a stool test kit for consideration of alternative GI pathogens. Given the absence of associated symptoms, I have low suspicion for viral infection at this point, but I would also further consider this if symptoms fail to improve.      24 minutes spent on the date of the encounter doing chart review, history and exam, documentation and further activities as noted above.      Jesus Chi MD  [facility]    Subjective   Asa is a 42 year old who presents for the following health issues    HPI   \"Traveler's Diarrhea\"  - persistent symptoms since returning from [place] 8 days ago  - has been using Pepto Bismol  - \"it's not severe but it's also not getting any better\"  - listed allergy (diarrhea) taking azithromycin  - denies fever, chills, nausea, fatigue, body aches, bloating  - \"just diarrhea\"  - no blood in stool    Review of Systems   Constitutional, HEENT, cardiovascular, pulmonary, gi and gu systems are negative, except as otherwise noted.      Objective    /81 (BP Location: Right arm, Patient Position: Sitting, Cuff Size: Adult Large)   Pulse 93   Temp 98.2  F (36.8  C) (Skin)   Resp 12   Wt 88.5 kg (195 lb)   SpO2 98%   BMI 25.04 kg/m    Body mass index is 25.04 kg/m .  Physical Exam   GENERAL: healthy, alert and no distress  EYES: Eyes grossly normal to inspection, PERRL and conjunctivae and sclerae normal  HENT: ear canals and TM's normal, nose and mouth without ulcers or lesions  NECK: no adenopathy, no asymmetry, masses, or scars and thyroid normal to palpation  RESP: lungs clear to auscultation - no " rales, rhonchi or wheezes  CV: regular rate and rhythm, normal S1 S2, no S3 or S4, no murmur, click or rub, no peripheral edema and peripheral pulses strong  ABDOMEN: soft, nontender, no hepatosplenomegaly, no masses and bowel sounds normal  MS: no gross musculoskeletal defects noted, no edema  SKIN: no suspicious lesions or rashes  NEURO: Normal strength and tone, mentation intact and speech normal  PSYCH: mentation appears normal, affect normal/bright

## 2022-06-24 ENCOUNTER — TELEPHONE (OUTPATIENT)
Dept: FAMILY MEDICINE | Facility: CLINIC | Age: 42
End: 2022-06-24

## 2022-06-24 NOTE — TELEPHONE ENCOUNTER
Spoke with pt on phone - he reports that he tested positive for Covid 6 days ago. He is still feeling poorly but thinks today is the turning point in his symptoms. Reviewed CDC isolation guidelines.     Pt also reporting small painful hard bump along one of his superficial veins of his right forearm. Reassured pt that this is most likely a superficial valve that is inflamed or possibly a very small phlebitic clot. Recommended that he try warm compresses and NSAIDs to alleviate discomfort and encourage breakdown of potential clot. Requested pt call back if he has any further cord-like appearance to vein, streaking redness, or unusual swelling in the arm. Pt confirms understanding.    Mohit Wadsworth - MADELAINE, RN  06/24/22 10:05 AM

## 2022-06-24 NOTE — TELEPHONE ENCOUNTER
Who is calling? Patient   Reason for Call:Patient tested positive 6 days ago. And noticing hives and rashes on his arm. Patient thinks his veins are swollen. Would like to speak with RN on what to do.

## 2022-06-29 ENCOUNTER — OFFICE VISIT (OUTPATIENT)
Dept: FAMILY MEDICINE | Facility: CLINIC | Age: 42
End: 2022-06-29
Payer: COMMERCIAL

## 2022-06-29 VITALS
BODY MASS INDEX: 24.53 KG/M2 | OXYGEN SATURATION: 96 % | DIASTOLIC BLOOD PRESSURE: 78 MMHG | HEIGHT: 74 IN | WEIGHT: 191.12 LBS | SYSTOLIC BLOOD PRESSURE: 128 MMHG | HEART RATE: 93 BPM | TEMPERATURE: 98.1 F

## 2022-06-29 DIAGNOSIS — I80.8 SUPERFICIAL THROMBOPHLEBITIS OF LEFT UPPER EXTREMITY: Primary | ICD-10-CM

## 2022-06-29 NOTE — NURSING NOTE
"42 year old  Chief Complaint   Patient presents with     Derm Problem     Pt reports a bump on his left arm he noticed on Friday.       Blood pressure 128/78, pulse 93, temperature 98.1  F (36.7  C), height 1.88 m (6' 2.02\"), weight 86.7 kg (191 lb 1.9 oz), SpO2 96 %. Body mass index is 24.53 kg/m .  Patient Active Problem List   Diagnosis     Benign neoplasm of skin     History of thyroid disease     Ocular migraine - Both Eyes     Major Depression     Generalized anxiety disorder     Benign prostatic hyperplasia with weak urinary stream       Wt Readings from Last 2 Encounters:   06/29/22 86.7 kg (191 lb 1.9 oz)   03/08/22 88.5 kg (195 lb)     BP Readings from Last 3 Encounters:   06/29/22 128/78   03/08/22 118/81   02/18/22 116/71         Current Outpatient Medications   Medication     fluticasone (FLONASE) 50 MCG/ACT nasal spray     alfuzosin ER (UROXATRAL) 10 MG 24 hr tablet     FLUoxetine (PROZAC) 10 MG capsule     ibuprofen (ADVIL/MOTRIN) 200 MG tablet     levofloxacin (LEVAQUIN) 750 MG tablet     Probiotic Product (PROBIOTIC-10 PO)     psyllium (METAMUCIL/KONSYL) capsule     No current facility-administered medications for this visit.       Social History     Tobacco Use     Smoking status: Never Smoker     Smokeless tobacco: Never Used   Substance Use Topics     Alcohol use: Yes     Comment: 4-5 days per week one beer or glass of wine     Drug use: No       Health Maintenance Due   Topic Date Due     PREVENTIVE CARE VISIT  Never done     ADVANCE CARE PLANNING  Never done     HEPATITIS C SCREENING  Never done       No results found for: PAP      June 29, 2022 3:32 PM  "

## 2022-06-29 NOTE — PROGRESS NOTES
"Asa Mann is a 42 year old male here for the following issues:    Bump on forearm  Asa reports a one week history of a \"blue-colored\", hard bump over the top of his right forearm. No recall of injury. Area was a bit tender, but no significant swelling or redness at the start. He was instructed by our nurses to use warm compresses and NSAIDs to alleviate discomfort.      He tried a hot compress which resolved pain, but did not reduce size. Denies red appearance at onset. He reports that the bump feels \"mobile\". Describes it as a \"bubble off the vein\", worried about a potential aneurysm.     PEEWEE Bray had a COVID infection 12-13 days ago. Primary sx were fever, chills, cough, unable to taste/smell; he describes it as very \"flu-like\". Today, he mentions that his sinuses are still congested. Concerned that congestion could turn into a bacterial infection. Was also infected with COVID inDecember 2021.    Anxiety  Asa is doing well on fluoxetine 10 mg daily, but reports some situational stress due to recent governmental decisions.   PHQ 1/20/2022 2/18/2022 6/29/2022   PHQ-9 Total Score 2 2 4   Q9: Thoughts of better off dead/self-harm past 2 weeks Not at all Not at all Not at all     NAVYA-7 SCORE 1/20/2022 2/18/2022 6/29/2022   Total Score 3 2 6       Patient Active Problem List   Diagnosis     Benign neoplasm of skin     History of thyroid disease     Ocular migraine - Both Eyes     Major Depression     Generalized anxiety disorder     Benign prostatic hyperplasia with weak urinary stream       Current Outpatient Medications   Medication Sig Dispense Refill     alfuzosin ER (UROXATRAL) 10 MG 24 hr tablet Take 1 tablet (10 mg) by mouth daily 90 tablet 3     FLUoxetine (PROZAC) 10 MG capsule Take 1 capsule (10 mg) by mouth daily 90 capsule 3     fluticasone (FLONASE) 50 MCG/ACT nasal spray        ibuprofen (ADVIL/MOTRIN) 200 MG tablet Take 200 mg by mouth every 4 hours as needed for mild pain       " "Probiotic Product (PROBIOTIC-10 PO)        psyllium (METAMUCIL/KONSYL) capsule          Allergies   Allergen Reactions     Amoxicillin Other (See Comments)     Tops of legs turned red, hot and itchy     Zithromax [Azithromycin Dihydrate] Diarrhea     Penicillins Rash        EXAM  /78   Pulse 93   Temp 98.1  F (36.7  C)   Ht 1.88 m (6' 2.02\")   Wt 86.7 kg (191 lb 1.9 oz)   SpO2 96%   BMI 24.53 kg/m    Gen: Alert, pleasant, mildly congested  L Forearm: \"lentil-sized\", dark purple, firm, slightly tender fixated mass, pea sized of dorsum of left arm. No surrounding redness or warmth.   Lungs: no increased work of breathing    Assessment:  (I80.9) Superficial thrombophlebitis  (primary encounter diagnosis)  Comment: Small superficial thrombosis in L forearm vessel.   Plan: discussed benight nature. Recommend hot compresses daily until clot resolves.       Rita Rios MD  Internal Medicine/Pediatrics      I, Joaquina Beaulieu, am serving as a scribe to document services personally performed by Dr. Rita Rios, based on data collection and the provider's statements to me. Dr. Rios has reviewed, edited, and approved the above note.     "

## 2022-07-09 ASSESSMENT — ANXIETY QUESTIONNAIRES
2. NOT BEING ABLE TO STOP OR CONTROL WORRYING: SEVERAL DAYS
1. FEELING NERVOUS, ANXIOUS, OR ON EDGE: SEVERAL DAYS
3. WORRYING TOO MUCH ABOUT DIFFERENT THINGS: SEVERAL DAYS
IF YOU CHECKED OFF ANY PROBLEMS ON THIS QUESTIONNAIRE, HOW DIFFICULT HAVE THESE PROBLEMS MADE IT FOR YOU TO DO YOUR WORK, TAKE CARE OF THINGS AT HOME, OR GET ALONG WITH OTHER PEOPLE: SOMEWHAT DIFFICULT
GAD7 TOTAL SCORE: 6
6. BECOMING EASILY ANNOYED OR IRRITABLE: SEVERAL DAYS
7. FEELING AFRAID AS IF SOMETHING AWFUL MIGHT HAPPEN: SEVERAL DAYS
GAD7 TOTAL SCORE: 6
5. BEING SO RESTLESS THAT IT IS HARD TO SIT STILL: NOT AT ALL

## 2022-07-09 ASSESSMENT — PATIENT HEALTH QUESTIONNAIRE - PHQ9
5. POOR APPETITE OR OVEREATING: SEVERAL DAYS
SUM OF ALL RESPONSES TO PHQ QUESTIONS 1-9: 4

## 2022-10-25 ENCOUNTER — OFFICE VISIT (OUTPATIENT)
Dept: FAMILY MEDICINE | Facility: CLINIC | Age: 42
End: 2022-10-25
Payer: COMMERCIAL

## 2022-10-25 VITALS
TEMPERATURE: 97.2 F | BODY MASS INDEX: 25.15 KG/M2 | HEART RATE: 85 BPM | RESPIRATION RATE: 15 BRPM | OXYGEN SATURATION: 98 % | SYSTOLIC BLOOD PRESSURE: 131 MMHG | WEIGHT: 196 LBS | DIASTOLIC BLOOD PRESSURE: 85 MMHG

## 2022-10-25 DIAGNOSIS — L98.9 SKIN LESION: Primary | ICD-10-CM
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
## 2022-10-25 NOTE — NURSING NOTE
42 year old  Chief Complaint   Patient presents with     Derm Problem     Mole check on abdomen and head,        Blood pressure 131/85, pulse 85, temperature 97.2  F (36.2  C), temperature source Skin, resp. rate 15, weight 88.9 kg (196 lb), SpO2 98 %. Body mass index is 25.15 kg/m .  Patient Active Problem List   Diagnosis     Benign neoplasm of skin     History of thyroid disease     Ocular migraine - Both Eyes     Major Depression     Generalized anxiety disorder     Benign prostatic hyperplasia with weak urinary stream       Wt Readings from Last 2 Encounters:   10/25/22 88.9 kg (196 lb)   06/29/22 86.7 kg (191 lb 1.9 oz)     BP Readings from Last 3 Encounters:   10/25/22 131/85   06/29/22 128/78   03/08/22 118/81         Current Outpatient Medications   Medication     alfuzosin ER (UROXATRAL) 10 MG 24 hr tablet     FLUoxetine (PROZAC) 10 MG capsule     fluticasone (FLONASE) 50 MCG/ACT nasal spray     ibuprofen (ADVIL/MOTRIN) 200 MG tablet     Probiotic Product (PROBIOTIC-10 PO)     psyllium (METAMUCIL/KONSYL) capsule     No current facility-administered medications for this visit.       Social History     Tobacco Use     Smoking status: Never     Smokeless tobacco: Never   Substance Use Topics     Alcohol use: Yes     Comment: 4-5 days per week one beer or glass of wine     Drug use: No       Health Maintenance Due   Topic Date Due     YEARLY PREVENTIVE VISIT  Never done     ADVANCE CARE PLANNING  Never done     HEPATITIS C SCREENING  Never done     COVID-19 Vaccine (4 - Booster for Moderna series) 02/01/2022       No results found for: PAP      October 25, 2022 9:55 AM

## 2023-01-30 ENCOUNTER — TELEPHONE (OUTPATIENT)
Dept: FAMILY MEDICINE | Facility: CLINIC | Age: 43
End: 2023-01-30

## 2023-01-30 NOTE — CONFIDENTIAL NOTE
"Traveling to [place] in March and needs a \"written version of prescriptions.\"  Offered to print out medication list and leave at  but he said he has an appt at clinic on 3/1/2023 and will get it then.  He is up to date on Covid and Influenza vaccines.    MADELAINE Srivastava, RN  01/30/23, 8:49 AM          "

## 2023-02-07 ENCOUNTER — OFFICE VISIT (OUTPATIENT)
Dept: DERMATOLOGY | Facility: CLINIC | Age: 43
End: 2023-02-07
Payer: COMMERCIAL

## 2023-02-07 DIAGNOSIS — Z12.83 SKIN CANCER SCREENING: Primary | ICD-10-CM

## 2023-02-07 DIAGNOSIS — B07.9 VERRUCA VULGARIS: ICD-10-CM

## 2023-02-07 DIAGNOSIS — D22.9 MULTIPLE MELANOCYTIC NEVI: ICD-10-CM

## 2023-02-07 DIAGNOSIS — L82.1 SEBORRHEIC KERATOSIS: ICD-10-CM

## 2023-02-07 PROCEDURE — 99203 OFFICE O/P NEW LOW 30 MIN: CPT | Mod: 25 | Performed by: DERMATOLOGY

## 2023-02-07 PROCEDURE — 17110 DESTRUCTION B9 LES UP TO 14: CPT | Performed by: DERMATOLOGY

## 2023-02-07 ASSESSMENT — PAIN SCALES - GENERAL: PAINLEVEL: NO PAIN (0)

## 2023-02-07 NOTE — LETTER
2/7/2023       RE: Asa Mann  [address] [place] [facility] [id number]     Dear Colleague,    Thank you for referring your patient, Asa Mann, to the [facility] DERMATOLOG[facility] [place] [facility]. Please see a copy of my visit note below.    Dermatology New Patient Visit    Chief complaint: Skin check and plantar wart    History of present illness:  Rich is a very pleasant 42-year-old male who is new patient to our clinic today, presenting for a skin check.  Previously he has seen dermatologist including 2 at [facility] and many years ago in our clinic at the [facility].  He has no personal history of melanoma but has quite fair skin and several moles that have been followed over time.  Today he notes several new light tan bumps on his left abdomen.  His primary care provider suspected that these were seborrheic keratoses.  He does not believe he has any other new or changing moles.  He does not have any nonhealing sores but does note a new plantar wart on the left forefoot which has been present for several months, which he would like treated if possible.    Physical exam:  General: In general this is a well-appearing well-nourished male with a normal mood and affect was oriented x3  Skin: Cutaneous exam of the head, neck, chest, abdomen, back, bilateral upper and lower extremities was performed.  On the left abdomen, there are clustered 2 to 3 mm flattopped flesh toned stuck on appearing papules consistent with seborrheic keratoses.  On the upper back, there are many light tan macules consistent with solar lentigines.  Scattered on the back and trunk, there are occasional 2 to 4 mm light tan to brown well marginated macules and flattopped papules consistent with benign nevi.  On the left plantar forefoot, there is a keratotic 3 mm verrucous papule with dot-like vessels on dermoscopy.    Assessment and plan:  1.  Overall benign skin cancer  screening exam in a patient with Lopez type I skin.  Reassurance was provided regarding his benign findings today.  We reemphasized the importance of careful sun protective behaviors including high SPF sunscreens and sun protective clothing.  We also discussed the signs and symptoms of nonmelanoma skin cancers as well as the ABCDEs of melanoma.  2.  Verruca vulgaris of the left plantar foot.  This was gently pared down with a #15 blade and after informed verbal consent, was treated with liquid nitrogen times 10 seconds x 2 cycles.  The patient tolerated this without complication.  3.  Benign findings including seborrheic keratoses and benign-appearing nevi.  Reassurance was provided.    He will follow-up in our clinic in 1 years time.    Beck Alexis MD  Dermatology Attending

## 2023-02-07 NOTE — NURSING NOTE
Dermatology Rooming Note    Asa Mann's goals for this visit include:   Chief Complaint   Patient presents with     Skin Check     Skin check; skin cancer screening- Asa states he has one concern on the left side of abdomen      Colton Tse, Visit Facilitator

## 2023-02-07 NOTE — PROGRESS NOTES
Dermatology New Patient Visit    Chief complaint: Skin check and plantar wart    History of present illness:  Rich is a very pleasant 42-year-old male who is new patient to our clinic today, presenting for a skin check.  Previously he has seen dermatologist including 2 at [facility] and many years ago in our clinic at the [facility].  He has no personal history of melanoma but has quite fair skin and several moles that have been followed over time.  Today he notes several new light tan bumps on his left abdomen.  His primary care provider suspected that these were seborrheic keratoses.  He does not believe he has any other new or changing moles.  He does not have any nonhealing sores but does note a new plantar wart on the left forefoot which has been present for several months, which he would like treated if possible.    Physical exam:  General: In general this is a well-appearing well-nourished male with a normal mood and affect was oriented x3  Skin: Cutaneous exam of the head, neck, chest, abdomen, back, bilateral upper and lower extremities was performed.  On the left abdomen, there are clustered 2 to 3 mm flattopped flesh toned stuck on appearing papules consistent with seborrheic keratoses.  On the upper back, there are many light tan macules consistent with solar lentigines.  Scattered on the back and trunk, there are occasional 2 to 4 mm light tan to brown well marginated macules and flattopped papules consistent with benign nevi.  On the left plantar forefoot, there is a keratotic 3 mm verrucous papule with dot-like vessels on dermoscopy.    Assessment and plan:  1.  Overall benign skin cancer screening exam in a patient with Lopez type I skin.  Reassurance was provided regarding his benign findings today.  We reemphasized the importance of careful sun protective behaviors including high SPF sunscreens and sun protective clothing.  We also discussed the signs and symptoms of nonmelanoma skin cancers as  well as the ABCDEs of melanoma.  2.  Verruca vulgaris of the left plantar foot.  This was gently pared down with a #15 blade and after informed verbal consent, was treated with liquid nitrogen times 10 seconds x 2 cycles.  The patient tolerated this without complication.  3.  Benign findings including seborrheic keratoses and benign-appearing nevi.  Reassurance was provided.    He will follow-up in our clinic in 1 years time.    Beck Alexis MD  Dermatology Attending

## 2023-02-28 ASSESSMENT — ENCOUNTER SYMPTOMS
DYSURIA: 0
DIZZINESS: 0
PARESTHESIAS: 0
MYALGIAS: 0
CHILLS: 0
CONSTIPATION: 0
JOINT SWELLING: 0
HEADACHES: 0
NAUSEA: 0
HEMATURIA: 0
ARTHRALGIAS: 0
DIARRHEA: 0
HEMATOCHEZIA: 0
COUGH: 0
HEARTBURN: 0
ABDOMINAL PAIN: 0
NERVOUS/ANXIOUS: 0
EYE PAIN: 0
FREQUENCY: 0
WEAKNESS: 0
PALPITATIONS: 0
SHORTNESS OF BREATH: 0
FEVER: 0
SORE THROAT: 0

## 2023-02-28 ASSESSMENT — ANXIETY QUESTIONNAIRES
6. BECOMING EASILY ANNOYED OR IRRITABLE: SEVERAL DAYS
IF YOU CHECKED OFF ANY PROBLEMS ON THIS QUESTIONNAIRE, HOW DIFFICULT HAVE THESE PROBLEMS MADE IT FOR YOU TO DO YOUR WORK, TAKE CARE OF THINGS AT HOME, OR GET ALONG WITH OTHER PEOPLE: SOMEWHAT DIFFICULT
4. TROUBLE RELAXING: SEVERAL DAYS
7. FEELING AFRAID AS IF SOMETHING AWFUL MIGHT HAPPEN: NOT AT ALL
GAD7 TOTAL SCORE: 2
5. BEING SO RESTLESS THAT IT IS HARD TO SIT STILL: NOT AT ALL
GAD7 TOTAL SCORE: 2
3. WORRYING TOO MUCH ABOUT DIFFERENT THINGS: NOT AT ALL
7. FEELING AFRAID AS IF SOMETHING AWFUL MIGHT HAPPEN: NOT AT ALL
8. IF YOU CHECKED OFF ANY PROBLEMS, HOW DIFFICULT HAVE THESE MADE IT FOR YOU TO DO YOUR WORK, TAKE CARE OF THINGS AT HOME, OR GET ALONG WITH OTHER PEOPLE?: SOMEWHAT DIFFICULT
1. FEELING NERVOUS, ANXIOUS, OR ON EDGE: NOT AT ALL
GAD7 TOTAL SCORE: 2
2. NOT BEING ABLE TO STOP OR CONTROL WORRYING: NOT AT ALL

## 2023-02-28 ASSESSMENT — PATIENT HEALTH QUESTIONNAIRE - PHQ9
10. IF YOU CHECKED OFF ANY PROBLEMS, HOW DIFFICULT HAVE THESE PROBLEMS MADE IT FOR YOU TO DO YOUR WORK, TAKE CARE OF THINGS AT HOME, OR GET ALONG WITH OTHER PEOPLE: SOMEWHAT DIFFICULT
SUM OF ALL RESPONSES TO PHQ QUESTIONS 1-9: 2
SUM OF ALL RESPONSES TO PHQ QUESTIONS 1-9: 2

## 2023-02-28 NOTE — PATIENT INSTRUCTIONS
Calcium 1200mg daily  Vitamin  international unit(s) daily    Preventive Health Recommendations  Male Ages 40 to 49    Yearly exam:             See your health care provider every year in order to  o   Review health changes.   o   Discuss preventive care.    o   Review your medicines if your doctor has prescribed any.  You should be tested each year for STDs (sexually transmitted diseases) if you re at risk.   Have a cholesterol test every 5 years.   Have a colonoscopy (test for colon cancer) if someone in your family has had colon cancer or polyps before age 50.   After age 45, have a diabetes test (fasting glucose). If you are at risk for diabetes, you should have this test every 3 years.    Talk with your health care provider about whether or not a prostate cancer screening test (PSA) is right for you.    Shots: Get a flu shot each year. Get a tetanus shot every 10 years.     Nutrition:  Eat at least 5 servings of fruits and vegetables daily.   Eat whole-grain bread, whole-wheat pasta and brown rice instead of white grains and rice.   Get adequate Calcium and Vitamin D.     Lifestyle  Exercise for at least 150 minutes a week (30 minutes a day, 5 days a week). This will help you control your weight and prevent disease.   Limit alcohol to one drink per day.   No smoking.   Wear sunscreen to prevent skin cancer.   See your dentist every six months for an exam and cleaning.

## 2023-03-01 ENCOUNTER — OFFICE VISIT (OUTPATIENT)
Dept: FAMILY MEDICINE | Facility: CLINIC | Age: 43
End: 2023-03-01
Payer: COMMERCIAL

## 2023-03-01 VITALS
DIASTOLIC BLOOD PRESSURE: 81 MMHG | BODY MASS INDEX: 25.17 KG/M2 | HEIGHT: 74 IN | TEMPERATURE: 97.8 F | OXYGEN SATURATION: 96 % | WEIGHT: 196.12 LBS | HEART RATE: 89 BPM | SYSTOLIC BLOOD PRESSURE: 124 MMHG

## 2023-03-01 DIAGNOSIS — Z00.00 ROUTINE GENERAL MEDICAL EXAMINATION AT A HEALTH CARE FACILITY: Primary | ICD-10-CM

## 2023-03-01 DIAGNOSIS — N40.1 BENIGN PROSTATIC HYPERPLASIA WITH WEAK URINARY STREAM: ICD-10-CM

## 2023-03-01 DIAGNOSIS — R39.12 BENIGN PROSTATIC HYPERPLASIA WITH WEAK URINARY STREAM: ICD-10-CM

## 2023-03-01 DIAGNOSIS — F41.1 GENERALIZED ANXIETY DISORDER: ICD-10-CM

## 2023-03-01 DIAGNOSIS — Z11.59 NEED FOR HEPATITIS C SCREENING TEST: ICD-10-CM

## 2023-03-01 DIAGNOSIS — Z13.220 SCREENING FOR LIPID DISORDERS: ICD-10-CM

## 2023-03-01 LAB
ANION GAP SERPL CALCULATED.3IONS-SCNC: 11 MMOL/L (ref 7–15)
BUN SERPL-MCNC: 12.3 MG/DL (ref 6–20)
CALCIUM SERPL-MCNC: 10 MG/DL (ref 8.6–10)
CHLORIDE SERPL-SCNC: 104 MMOL/L (ref 98–107)
CHOLEST SERPL-MCNC: 225 MG/DL
CREAT SERPL-MCNC: 1.04 MG/DL (ref 0.67–1.17)
DEPRECATED HCO3 PLAS-SCNC: 28 MMOL/L (ref 22–29)
GFR SERPL CREATININE-BSD FRML MDRD: >90 ML/MIN/1.73M2
GLUCOSE SERPL-MCNC: 93 MG/DL (ref 70–99)
HDLC SERPL-MCNC: 42 MG/DL
LDLC SERPL CALC-MCNC: 159 MG/DL
NONHDLC SERPL-MCNC: 183 MG/DL
POTASSIUM SERPL-SCNC: 4.7 MMOL/L (ref 3.4–5.3)
SODIUM SERPL-SCNC: 143 MMOL/L (ref 136–145)
TRIGL SERPL-MCNC: 121 MG/DL

## 2023-03-01 PROCEDURE — 86803 HEPATITIS C AB TEST: CPT | Performed by: INTERNAL MEDICINE

## 2023-03-01 PROCEDURE — 80061 LIPID PANEL: CPT | Performed by: INTERNAL MEDICINE

## 2023-03-01 PROCEDURE — 80048 BASIC METABOLIC PNL TOTAL CA: CPT | Performed by: INTERNAL MEDICINE

## 2023-03-01 RX ORDER — FLUOXETINE 10 MG/1
10 CAPSULE ORAL DAILY
Qty: 90 CAPSULE | Refills: 3 | Status: SHIPPED | OUTPATIENT
Start: 2023-03-01 | End: 2023-12-04

## 2023-03-01 RX ORDER — ALFUZOSIN HYDROCHLORIDE 10 MG/1
10 TABLET, EXTENDED RELEASE ORAL DAILY
Qty: 90 TABLET | Refills: 3 | Status: SHIPPED | OUTPATIENT
Start: 2023-03-01 | End: 2023-12-04

## 2023-03-01 NOTE — NURSING NOTE
"43 year old  Chief Complaint   Patient presents with     Physical     Pt is requesting printed versions of rx for travel to [place]       Blood pressure 124/81, pulse 89, temperature 97.8  F (36.6  C), height 1.889 m (6' 2.37\"), weight 89 kg (196 lb 1.9 oz), SpO2 96 %. Body mass index is 24.93 kg/m .  Patient Active Problem List   Diagnosis     Benign neoplasm of skin     History of thyroid disease     Ocular migraine - Both Eyes     Major Depression     Generalized anxiety disorder     Benign prostatic hyperplasia with weak urinary stream       Wt Readings from Last 2 Encounters:   03/01/23 89 kg (196 lb 1.9 oz)   10/25/22 88.9 kg (196 lb)     BP Readings from Last 3 Encounters:   03/01/23 124/81   10/25/22 131/85   06/29/22 128/78         Current Outpatient Medications   Medication     alfuzosin ER (UROXATRAL) 10 MG 24 hr tablet     FLUoxetine (PROZAC) 10 MG capsule     ibuprofen (ADVIL/MOTRIN) 200 MG tablet     Probiotic Product (PROBIOTIC-10 PO)     psyllium (METAMUCIL/KONSYL) capsule     fluticasone (FLONASE) 50 MCG/ACT nasal spray     No current facility-administered medications for this visit.       Social History     Tobacco Use     Smoking status: Never     Smokeless tobacco: Never   Substance Use Topics     Alcohol use: Yes     Comment: 4-5 days per week one beer or glass of wine     Drug use: No       Health Maintenance Due   Topic Date Due     ADVANCE CARE PLANNING  Never done     HEPATITIS C SCREENING  Never done       No results found for: PAP      March 1, 2023 1:05 PM  "

## 2023-03-01 NOTE — PROGRESS NOTES
Asa Mann is a 43 year old male with hx of depression, anxiety, BPH,and ocular migraines.  He is here for a general check up.  He is fasting. He is up to date on eye exams and due for dental visits. Visiting [place] for 2 wks.     HCM  Advanced directive: none on file, discussed  COVID Series: Bivalent booster completed.  Other Vaccines: Flu, TDAP up to date.  Hepatitis C screening  Due today    Diet: veggie strong, avoiding dairy (?lactose intolerance), fish , chicken, beef pork  Wt Readings from Last 4 Encounters:   10/25/22 88.9 kg (196 lb)   06/29/22 86.7 kg (191 lb 1.9 oz)   03/08/22 88.5 kg (195 lb)   02/18/22 90.2 kg (198 lb 12 oz)     Body mass index is 24.93 kg/m .    Anxiety   Asa takes fluoxetine 10 mg daily for treatment of anxiety. He is doing well with medication.   Panic attacks: none  Insomnia none, snoring, mild  Therapist: yes    PHQ 2/18/2022 7/9/2022 2/28/2023   PHQ-9 Total Score 2 4 2   Q9: Thoughts of better off dead/self-harm past 2 weeks Not at all Not at all Not at all     NAVYA-7 SCORE 2/18/2022 7/9/2022 2/28/2023   Total Score - - 2 (minimal anxiety)   Total Score 2 6 2     Benign prostatic hyperplasia with weak urinary stream  Rich takes uraxatrol, doing well in regard to BPH symptoms. He is in need of medication refill.     PMH, PSH, FH, medications, allergies and immunizations are updated this visit.      Social  , spouse [employer] work, computer    HABITS:  Tob: none  ETOH: 1-2/day  Calcium: oat milk (1)  Caffeine: 2/day  Exercise: No formal exercise program, some push ups, cardio in summer    MALE ROS  Partner: spouse  ED: no  Contraception: IUD  BPH: no symptoms      Current Outpatient Medications   Medication Sig Dispense Refill     alfuzosin ER (UROXATRAL) 10 MG 24 hr tablet Take 1 tablet (10 mg) by mouth daily 30 tablet 0     FLUoxetine (PROZAC) 10 MG capsule Take 1 capsule (10 mg) by mouth daily 30 capsule 0     fluticasone (FLONASE) 50 MCG/ACT nasal spray     "    ibuprofen (ADVIL/MOTRIN) 200 MG tablet Take 200 mg by mouth every 4 hours as needed for mild pain       Probiotic Product (PROBIOTIC-10 PO)        psyllium (METAMUCIL/KONSYL) capsule        Allergies   Allergen Reactions     Amoxicillin Other (See Comments)     Tops of legs turned red, hot and itchy     Zithromax [Azithromycin Dihydrate] Diarrhea     Penicillins Rash         ROS  CONSTITUTIONAL:NEGATIVE for fever, chills, change in weight  INTEGUMENTARY/SKIN: NEGATIVE for worrisome rashes, moles or lesions  EYES: NEGATIVE for vision changes or irritation  ENT/MOUTH: NEGATIVE for ear, mouth and throat problems  RESP:NEGATIVE for significant cough or SOB  CV: NEGATIVE for chest pain, palpitations, MERRITT, orthopnea, PND  or peripheral edema  GI: NEGATIVE for nausea, abdominal pain, heartburn, or change in bowel habits  :NEGATIVE for frequency, dysuria, or hematuria  MUSCULOSKELETAL:NEGATIVE for significant arthralgias or myalgia, reports he stubbed his pinky toe several weeks ago, bruising is improving  NEURO: NEGATIVE for weakness, dizziness or paresthesias  ENDOCRINE: NEGATIVE for polyuria/dipsia,  temperature intolerance, skin/hair changes  HEME/ALLERGY/IMMUNE: NEGATIVE for bleeding problems  PSYCHIATRIC: NEGATIVE for changes in mood or affect    EXAM  /81   Pulse 89   Temp 97.8  F (36.6  C)   Ht 1.889 m (6' 2.37\")   Wt 89 kg (196 lb 1.9 oz)   SpO2 96%   BMI 24.93 kg/m    GENERAL APPEARANCE: Alert, pleasant, NAD  EYES: PERRL, EOMI, conjunctiva clear  HENT: TM normal bilaterally. Nose and mouth masked  NECK: no adenopathy, thyroid normal to palpation  RESP: lungs clear to auscultation bilaterally  Axillae: no palpable axillary masses or adenopathy  CV: regular rate and rhythm, normal S1 S2, no murmur, no carotid bruits  ABDOMEN: soft, nontender, without HSM or masses. Bowel sounds normal  : no inguinal hernias or adenopathy, no testicular masses  Rectal exam: deferred  MS: extremities normal- no " gross deformities noted, no tender, hot or swollen joints.    SKIN: no suspicious lesions or rashes  NEURO: Normal strength and tone, sensory exam grossly normal, DTR normoreflexive in upper and lower extremities  PSYCH: mentation appears normal. and affect normal/bright.  EXT: no peripheral edema, pedal pulses palpable    Assessment:  (Z00.00) Routine general medical examination at a health care facility  (primary encounter diagnosis)  Comment: healthy 43 year old male  Plan: Today we discussed ways to maintain a healthy lifestyle with sensible eating, regular exercise and self cares. We dicussed calcium and Vitamin D intake, vaccinations and preventive health screens.        (Z13.220) Screening for lipid disorders  Comment: number improved significantly since last year.   Recent Labs   Lab Test 03/01/23  1348 02/18/22  0952 08/07/17  0830   CHOL 225* 262* 240.0*   HDL 42 47 48.0   * 185* 160.0*   TRIG 121 152* 160.0*   CHOLHDLRATIO  --   --  5.0   Plan: Basic metabolic panel, Lipid Profile        Continue to make efforts with healthy diet, exercise    (Z11.59) Need for hepatitis C screening test  Comment: routine screening  Plan: Hepatitis C Screen Reflex to HCV RNA Quant and         Genotype            (F41.1) Generalized anxiety disorder  Comment: doing very well with fluoxetine  Plan: FLUoxetine (PROZAC) 10 MG capsule        Refilled medication x 1 yr    (N40.1,  R39.12) Benign prostatic hyperplasia with weak urinary stream  Comment: no current symptoms, medication working well  Plan: alfuzosin ER (UROXATRAL) 10 MG 24 hr tablet        Refilled x 1 yr    Rita Rios MD  Internal Medicine/Pediatrics

## 2023-03-01 NOTE — LETTER
March 1, 2023    RE: Asa Mann  [address] [place] [address]            To whom it may concern,    Asa Mann is under my care at the [facility] and is in good health.  He takes the following medications:    Current Outpatient Medications   Medication Sig Dispense Refill     alfuzosin ER (UROXATRAL) 10 MG 24 hr tablet Take 1 tablet (10 mg) by mouth daily 90 tablet 3     FLUoxetine (PROZAC) 10 MG capsule Take 1 capsule (10 mg) by mouth daily 90 capsule 3     ibuprofen (ADVIL/MOTRIN) 200 MG tablet Take 200 mg by mouth every 4 hours as needed for mild pain       Probiotic Product (PROBIOTIC-10 PO) 1 capsule daily       psyllium (METAMUCIL/KONSYL) capsule Take 1 Tablespoon daily         Sincerely,            Rita Rios MD  Internal Medicine/Pediatrics

## 2023-03-03 LAB — HCV AB SERPL QL IA: NONREACTIVE

## 2023-03-04 PROBLEM — D22.9 MULTIPLE MELANOCYTIC NEVI: Status: ACTIVE | Noted: 2023-03-04

## 2023-03-04 PROBLEM — B07.9 VERRUCA VULGARIS: Status: ACTIVE | Noted: 2023-03-04

## 2023-03-04 PROBLEM — Z12.83 SKIN CANCER SCREENING: Status: ACTIVE | Noted: 2023-03-04

## 2023-03-04 PROBLEM — L82.1 SEBORRHEIC KERATOSIS: Status: ACTIVE | Noted: 2023-03-04

## 2023-07-13 ENCOUNTER — NURSE TRIAGE (OUTPATIENT)
Dept: FAMILY MEDICINE | Facility: CLINIC | Age: 43
End: 2023-07-13

## 2023-07-13 NOTE — TELEPHONE ENCOUNTER
"    Reason for Disposition    Dizziness, lightheadedness, or weakness    Answer Assessment - Initial Assessment Questions  1. DESCRIPTION: Irregular heart rate  2. ONSET: This morning, 7/13/23  3. DURATION: Constant  4. PATTERN: Irregularly irregular  5. TAP: Cannot replicate  6. HEART RATE: Hasn't done this  7. RECURRENT SYMPTOM: Had an episode of this previously and was not able to capture it on EKG.  8. CAUSE: Unknown  9. CARDIAC HISTORY: No  10. OTHER SYMPTOMS: Some chest discomfort, light headedness and \"feeling off\".    Recommended Asa be seen at an Emergency Room being the symptoms are not transient, but have lasted all morning thus far.  He requires monitoring and possibly further testing to determine a cause for this return symptom.  Provided address of [facility] in [place] and Asa said he will go there.    Melissa Loya, MAITEN, RN, [facility]  RN Care Coordinator  [facility]  07/13/23  11:40 AM  Phone: [phone]    Protocols used: HEART RATE AND HEARTBEAT [id number]      "

## 2023-07-20 NOTE — PROGRESS NOTES
"Asa Mann is a 43 year old male with hx of depression, anxiety, BPH, ocular migraines. He is here for the following issues:    Hospital discharge follow-up  JACQUELINE Bray presented to [facility] 7/13/2023 with palpitations and atypical chest pain. He reported a history of intermittent palpitations over the prior few days. He had a similar problem in 2/2022. Followed with me on 2/18/2022, normal EKG, suspected benign PAC's or PVC's. Symptoms resolved without intervention. Two days prior to his most recent ER visit, he started noticing frequent episodes of palpitations. He described chest pain as a very linear area, overlying the left rib cage, \"like a pulled muscle\".  Pain was not triggered by exertion. Stretching worsened symptoms. No associated shortness of breath, lightheadedness, diaphoresis. No leg pain or swelling. No personal or family history of blood clots in the legs or lungs, no recent prolonged period of inactivity, no recent surgery, no personal history of cancer. No family history of coronary artery disease.    EKG showed 1 PVC, consistent with when he felt the palpitation. Chest X-ray negative. Labs were otherwise unremarkable, except for initial troponin was greater than the 99th percentile but did not change in repeat. He was discharged with close outpatient follow-up.     Today, Asa states he had an episode of palpitations, while sitting at a computer about a week ago. He describes a \"flip-flop\" in his chest that would often make him feel like he needed to cough. This would last for 10 seconds at a time, but occur in clusters about 30 seconds apart. There could be hours between clusters. Left sided chest discomfort persists.  It does not increase in tenderness with pressure. He also notes that he has always had vision dimming with standing up too fast. Denies exercise limitation. He would like to have further workup with cardiology for peace of mind.     EtOH: 1 beer/day  TSH " "within normal limits at 1.58 on 7/13/2023    Snoring  Asa reports he has always been \"a snorer\" and if he falls asleep on a cough on his back rather than on his side, he will often wake up with a gasp and a fast heart beat. He thinks he may have sleep apnea. He is  and his wife has heard him snore, but was unsure if he ever stopped breathing when he asked. He intermittently will get really hot while sleeping, causing difficulty sleeping, but states this is not consistent. Denies daytime drowsiness or unrefreshed sleep. No difficulty getting to sleep or staying asleep.       Patient Active Problem List   Diagnosis    Benign neoplasm of skin    History of thyroid disease    Ocular migraine - Both Eyes    Major Depression    Generalized anxiety disorder    Benign prostatic hyperplasia with weak urinary stream    Skin cancer screening    Verruca vulgaris    Seborrheic keratosis    Multiple melanocytic nevi       Current Outpatient Medications   Medication Sig Dispense Refill    alfuzosin ER (UROXATRAL) 10 MG 24 hr tablet Take 1 tablet (10 mg) by mouth daily 90 tablet 3    FLUoxetine (PROZAC) 10 MG capsule Take 1 capsule (10 mg) by mouth daily 90 capsule 3    ibuprofen (ADVIL/MOTRIN) 200 MG tablet Take 200 mg by mouth every 4 hours as needed for mild pain      Probiotic Product (PROBIOTIC-10 PO)       psyllium (METAMUCIL/KONSYL) capsule          Allergies   Allergen Reactions    Amoxicillin Other (See Comments)     Tops of legs turned red, hot and itchy    Zithromax [Azithromycin Dihydrate] Diarrhea    Penicillins Rash        EXAM  /74 (BP Location: Right arm, Patient Position: Sitting, Cuff Size: Adult Large)   Pulse 90   Temp 97.6  F (36.4  C) (Skin)   Resp 15   Ht 1.88 m (6' 2\")   Wt 88.5 kg (195 lb)   SpO2 97%   BMI 25.04 kg/m    Gen: Alert, pleasant, NAD  Neck: No thyromegaly.   COR: S1,S2, no murmur. No ectopy.  Lungs: CTA bilaterally, no rhonchi, wheezes or rales  Chest Wall: Nontender to " palpation.       Assessment:  (Z09) Hospital discharge follow-up  (primary encounter diagnosis)  (I49.3) PVC (premature ventricular contraction)  Comment: Recent ER visit for evaluation of palpitations. Episodic PVCs, no red flag symptoms. Last episode was 1.5 years ago.  He is asking for referral to see a cardiologist.   Plan: Adult Cardiology Eval  Referral        Reassurance given. Discussed option to try beta-blocker, either daily or when symptoms emerge. Doubt underlying structural heart disease, however, he would like to discuss with a cardiologist. I'm very comfortable with referring him for a second opinion.     (R06.83) Snoring  Comment: Per patient history, he has snoring, unsure if he stops breathing. Denies daytime drowsiness.   Plan: Sleep Study Referral        Referred to sleep medicine for evaluation.     31 minutes spend on the date of this encounter doing chart review, history and exam, documentation and further activities as noted above.     I have reviewed, edited and approved the above scribed note.    Rita Rios MD  Internal Medicine/Pediatrics      I, Greta Angel, am serving as a scribe to document services personally performed by Dr. Rita Rios, based on data collection and the provider's statements to me.

## 2023-07-21 ENCOUNTER — OFFICE VISIT (OUTPATIENT)
Dept: FAMILY MEDICINE | Facility: CLINIC | Age: 43
End: 2023-07-21
Payer: COMMERCIAL

## 2023-07-21 VITALS
HEART RATE: 90 BPM | OXYGEN SATURATION: 97 % | DIASTOLIC BLOOD PRESSURE: 74 MMHG | BODY MASS INDEX: 25.03 KG/M2 | WEIGHT: 195 LBS | TEMPERATURE: 97.6 F | RESPIRATION RATE: 15 BRPM | SYSTOLIC BLOOD PRESSURE: 119 MMHG | HEIGHT: 74 IN

## 2023-07-21 DIAGNOSIS — I49.3 PVC (PREMATURE VENTRICULAR CONTRACTION): ICD-10-CM

## 2023-07-21 DIAGNOSIS — R06.83 SNORING: ICD-10-CM

## 2023-07-21 DIAGNOSIS — Z09 HOSPITAL DISCHARGE FOLLOW-UP: Primary | ICD-10-CM

## 2023-07-21 ASSESSMENT — ANXIETY QUESTIONNAIRES
5. BEING SO RESTLESS THAT IT IS HARD TO SIT STILL: NOT AT ALL
IF YOU CHECKED OFF ANY PROBLEMS ON THIS QUESTIONNAIRE, HOW DIFFICULT HAVE THESE PROBLEMS MADE IT FOR YOU TO DO YOUR WORK, TAKE CARE OF THINGS AT HOME, OR GET ALONG WITH OTHER PEOPLE: NOT DIFFICULT AT ALL
2. NOT BEING ABLE TO STOP OR CONTROL WORRYING: NOT AT ALL
3. WORRYING TOO MUCH ABOUT DIFFERENT THINGS: NOT AT ALL
GAD7 TOTAL SCORE: 2
1. FEELING NERVOUS, ANXIOUS, OR ON EDGE: NOT AT ALL
7. FEELING AFRAID AS IF SOMETHING AWFUL MIGHT HAPPEN: NOT AT ALL
6. BECOMING EASILY ANNOYED OR IRRITABLE: SEVERAL DAYS
GAD7 TOTAL SCORE: 2

## 2023-07-21 ASSESSMENT — PATIENT HEALTH QUESTIONNAIRE - PHQ9
SUM OF ALL RESPONSES TO PHQ QUESTIONS 1-9: 1
5. POOR APPETITE OR OVEREATING: SEVERAL DAYS

## 2023-07-21 NOTE — NURSING NOTE
"43 year old  Chief Complaint   Patient presents with     Hospital F/U     PT went to the urgency room due to heart palpitations. EKG results show some concern.        Blood pressure 119/74, pulse 90, temperature 97.6  F (36.4  C), temperature source Skin, resp. rate 15, height 1.88 m (6' 2\"), weight 88.5 kg (195 lb), SpO2 97 %. Body mass index is 25.04 kg/m .  Patient Active Problem List   Diagnosis     Benign neoplasm of skin     History of thyroid disease     Ocular migraine - Both Eyes     Major Depression     Generalized anxiety disorder     Benign prostatic hyperplasia with weak urinary stream     Skin cancer screening     Verruca vulgaris     Seborrheic keratosis     Multiple melanocytic nevi       Wt Readings from Last 2 Encounters:   07/21/23 88.5 kg (195 lb)   03/01/23 89 kg (196 lb 1.9 oz)     BP Readings from Last 3 Encounters:   07/21/23 119/74   03/01/23 124/81   10/25/22 131/85         Current Outpatient Medications   Medication     alfuzosin ER (UROXATRAL) 10 MG 24 hr tablet     FLUoxetine (PROZAC) 10 MG capsule     ibuprofen (ADVIL/MOTRIN) 200 MG tablet     Probiotic Product (PROBIOTIC-10 PO)     psyllium (METAMUCIL/KONSYL) capsule     No current facility-administered medications for this visit.       Social History     Tobacco Use     Smoking status: Never     Smokeless tobacco: Never   Vaping Use     Vaping Use: Never used   Substance Use Topics     Alcohol use: Yes     Comment: 4-5 days per week one beer or glass of wine     Drug use: No       Health Maintenance Due   Topic Date Due     ADVANCE CARE PLANNING  Never done       No results found for: PAP      July 21, 2023 10:09 AM    "

## 2023-07-24 NOTE — TELEPHONE ENCOUNTER
RECORDS RECEIVED FROM:    DATE RECEIVED:    NOTES STATUS DETAILS   OFFICE NOTE from referring provider  Internal Dr. Rios 7-21-23   OFFICE NOTE from other cardiologists  N/A    RECORDS from hospital/ED Care Everywhere 7-13-23   MEDICATION LIST Internal    GENERAL CARDIO RECORDS   (ALL APPOINTMENT TYPES)     LABS (CBC,BMP,CMP, TSH) Internal    EKG (STRIPS & REPORTS) Care Everywhere 7-13-23 in [facility]   MONITORS (STRIPS & REPORTS) N/A    ECHOS (IMAGES AND REPORTS) N/A    STRESS TESTS (IMAGES AND REPORTS) N/A    cMRI (IMAGES AND REPORTS) N/A    CT/CTA (IMAGES AND REPORTS) In process 2-1-22 Requested   NEW EP     ICD/PACEMAKER IMPLANT No    CARDIOVERSION N/A    TILT TABLE STUDIES N/A      Action 7/24/23 [place]  Fax #[phone]   Action Taken Requested CT Cardiac calcium 2-1-22    Sent second request 8/8    Resolved images in PACS 8/25

## 2023-08-17 ENCOUNTER — OFFICE VISIT (OUTPATIENT)
Dept: FAMILY MEDICINE | Facility: CLINIC | Age: 43
End: 2023-08-17
Payer: COMMERCIAL

## 2023-08-17 VITALS
HEIGHT: 74 IN | DIASTOLIC BLOOD PRESSURE: 76 MMHG | BODY MASS INDEX: 25.7 KG/M2 | TEMPERATURE: 98.5 F | OXYGEN SATURATION: 98 % | HEART RATE: 77 BPM | SYSTOLIC BLOOD PRESSURE: 115 MMHG | WEIGHT: 200.25 LBS

## 2023-08-17 DIAGNOSIS — K62.89 RECTAL PAIN: Primary | ICD-10-CM

## 2023-08-17 ASSESSMENT — PATIENT HEALTH QUESTIONNAIRE - PHQ9
5. POOR APPETITE OR OVEREATING: SEVERAL DAYS
SUM OF ALL RESPONSES TO PHQ QUESTIONS 1-9: 3

## 2023-08-17 ASSESSMENT — ANXIETY QUESTIONNAIRES
7. FEELING AFRAID AS IF SOMETHING AWFUL MIGHT HAPPEN: NOT AT ALL
IF YOU CHECKED OFF ANY PROBLEMS ON THIS QUESTIONNAIRE, HOW DIFFICULT HAVE THESE PROBLEMS MADE IT FOR YOU TO DO YOUR WORK, TAKE CARE OF THINGS AT HOME, OR GET ALONG WITH OTHER PEOPLE: NOT DIFFICULT AT ALL
2. NOT BEING ABLE TO STOP OR CONTROL WORRYING: NOT AT ALL
6. BECOMING EASILY ANNOYED OR IRRITABLE: NOT AT ALL
1. FEELING NERVOUS, ANXIOUS, OR ON EDGE: SEVERAL DAYS
GAD7 TOTAL SCORE: 2
GAD7 TOTAL SCORE: 2
3. WORRYING TOO MUCH ABOUT DIFFERENT THINGS: NOT AT ALL
5. BEING SO RESTLESS THAT IT IS HARD TO SIT STILL: NOT AT ALL

## 2023-08-17 NOTE — NURSING NOTE
"43 year old    Chief Complaint   Patient presents with    Gastrointestinal Problem     Was feeling nauseous and tailbone/penis pain about a week ago before [place] trip, diarrhea and urinary urgency occurred two days into trip so he went to Urgent Care, was feeling \"weird tailbone pain that turned to rectum and penis pain\", urine and blood work came back normal  Has hx of bacterial prostatitis             Blood pressure 115/76, pulse 77, temperature 98.5  F (36.9  C), temperature source Temporal, height 1.88 m (6' 2\"), weight 90.8 kg (200 lb 4 oz), SpO2 98 %. Body mass index is 25.71 kg/m .    Patient Active Problem List   Diagnosis    Benign neoplasm of skin    History of thyroid disease    Ocular migraine - Both Eyes    Major Depression    Generalized anxiety disorder    Benign prostatic hyperplasia with weak urinary stream    Skin cancer screening    Verruca vulgaris    Seborrheic keratosis    Multiple melanocytic nevi              Wt Readings from Last 2 Encounters:   08/17/23 90.8 kg (200 lb 4 oz)   07/21/23 88.5 kg (195 lb)       BP Readings from Last 3 Encounters:   08/17/23 115/76   07/21/23 119/74   03/01/23 124/81                Current Outpatient Medications   Medication    alfuzosin ER (UROXATRAL) 10 MG 24 hr tablet    ibuprofen (ADVIL/MOTRIN) 200 MG tablet    Probiotic Product (PROBIOTIC-10 PO)    psyllium (METAMUCIL/KONSYL) capsule    FLUoxetine (PROZAC) 10 MG capsule     No current facility-administered medications for this visit.              Social History     Tobacco Use    Smoking status: Never    Smokeless tobacco: Never   Vaping Use    Vaping Use: Never used   Substance Use Topics    Alcohol use: Yes     Comment: 4-5 days per week one beer or glass of wine    Drug use: No              Health Maintenance Due   Topic Date Due    ADVANCE CARE PLANNING  Never done    Pneumococcal Vaccine: Pediatrics (0 to 5 Years) and At-Risk Patients (6 to 64 Years) (1 - PCV) Never done            No results found " for: PAP           August 17, 2023 4:18 PM

## 2023-08-17 NOTE — PROGRESS NOTES
"  Assessment & Plan   Problem List Items Addressed This Visit    None  Visit Diagnoses       Rectal pain    -  Primary          Considered hemorrhoids vs fissure/fistula vs gastritis vs diverticulitis vs prostatitis vs STD. At this point, symptoms appear to be improving. Encouraged ongoing monitoring. I do not currently think ABX are indicated but this could change if infection should declare more forcefully. Given Rich's revelation that some of his GI symptoms have been present for a longer period of time I am encouraging him to meet with GI for further assessment. There could be an underlying element of IBS aggravating more acute symptoms.     33 minutes spent on the date of the encounter doing chart review, history and exam, documentation and further activities as noted.      MD SPENSER [facility]    Subjective   Asa is a 43 year old, presenting for the following health issues:  Gastrointestinal Problem (Was feeling nauseous and tailbone/penis pain about a week ago before [place] trip, diarrhea and urinary urgency occurred two days into trip so he went to Urgent Care, was feeling \"weird tailbone pain that turned to rectum and penis pain\", urine and blood work came back normal/Has hx of bacterial prostatitis )      HPI   Pain, mostly in rectum but some radiating to penis  - 14 days ago, started to feel symptoms at rectum during a BM  - the next day started to feel brief episodes of sharp pain at rectum and into his penis  - the next day he flew to [place]; that night symptoms continued to worsen  - the next day he went to a U/C center; they tested urine and blood, checked his prostate, everything came back unremarkable  - since then, most of the symptoms have resolved but he does report he continues to feel some discomfort at his rectum, feeling like he hasn't completely emptied his bowels with BMs  - Rich notes that actually, this sensation of lingering irritation/incomplete bowel " "emptying has been pretty consistent for the last year, long before these more acute symptoms   - Asa has taken psyllium husk daily for probably the last 2 years, since his bowels got really messed up during a trip to [place]  - denies any blood in stool  - no fever, chills, nausea, fatigue, body aches  - about 10 years ago Rich was diagnosed with bacterial prostatitis; he says the initial sharp pains in his rectum and penis kind of reminded him of that, but that has since resolved  - he reports one of the first symptoms during his previous prostatitis was cramping during ejaculation; he denies any similar symptoms this time    Review of Systems   Constitutional, HEENT, cardiovascular, pulmonary, gi and gu systems are negative, except as otherwise noted.      Objective    /76 (BP Location: Right arm, Patient Position: Sitting, Cuff Size: Adult Large)   Pulse 77   Temp 98.5  F (36.9  C) (Temporal)   Ht 1.88 m (6' 2\")   Wt 90.8 kg (200 lb 4 oz)   SpO2 98%   BMI 25.71 kg/m    Body mass index is 25.71 kg/m .  Physical Exam   GENERAL: healthy, alert and no distress  NECK: no adenopathy, no asymmetry, masses, or scars and thyroid normal to palpation  RESP: lungs clear to auscultation - no rales, rhonchi or wheezes  CV: regular rate and rhythm, normal S1 S2, no S3 or S4, no murmur, click or rub, no peripheral edema and peripheral pulses strong  ABDOMEN: soft, nontender, no hepatosplenomegaly, no masses and bowel sounds normal   (male): normal male genitalia without lesions or urethral discharge, no hernia  RECTAL (male): normal sphincter tone, no rectal masses, prostate normal size, smooth, nontender without nodules or masses  MS: no gross musculoskeletal defects noted, no edema                  "

## 2023-08-31 ENCOUNTER — TELEPHONE (OUTPATIENT)
Dept: FAMILY MEDICINE | Facility: CLINIC | Age: 43
End: 2023-08-31

## 2023-08-31 NOTE — TELEPHONE ENCOUNTER
Who is calling? Patient     Reason for Call:Patient is requesting a call back from RN re: upper respiratory x5, Monday and yesterday two negative covid home testing.     Ph: [phone]

## 2023-08-31 NOTE — TELEPHONE ENCOUNTER
Pt reporting upper respiratory symptoms that started on Sunday 8/27. He had telehealth appt yesterday, provider noted most likely viral infection. Minor wheezes, taking Tylenol to manage temperature, mildly productive cough, nasal congestion. He is using Flonase, Sudafed, and ibuprofen which all seem to help with symptoms. Advised pt to call on Tuesday morning 9/5 if he is not feeling better. If his wheezing worsens or he develops SOB advised him to call back tomorrow morning for appt in triage slot. Pt confirms understanding and is agreeable to this plan.    Mohit Wadsworth - MADELAINE, RN  08/31/23 10:44 AM

## 2023-09-07 ENCOUNTER — TELEPHONE (OUTPATIENT)
Dept: FAMILY MEDICINE | Facility: CLINIC | Age: 43
End: 2023-09-07

## 2023-09-07 NOTE — PROGRESS NOTES
"  Assessment & Plan   Problem List Items Addressed This Visit    None  Visit Diagnoses       Upper respiratory tract infection, unspecified type    -  Primary    Relevant Medications    predniSONE (DELTASONE) 20 MG tablet    cephALEXin (KEFLEX) 500 MG capsule           Given duration of symptoms (12 days) and trajectory of symptoms, chief suspicion is for resolving viral URI. However, given upcoming weekend I have agreed to insurance ABX should symptoms worsen acutely, and steroid burst to help with recovery. Encouraged Asa to contact this clinic with any questions or concerns.     32 minutes spent on the date of the encounter doing chart review, history and exam, documentation and further activities as noted.      MD [facility]    Subjective   Asa is a 43 year old, presenting for the following health issues:  URI (On day 12 of sx, still coughing (not productive), sinus pressure, and fatigued, feels mucus stuck in lungs and is experiencing burning when he cough)      HPI   \"Ongoing respiratory symptoms, cough and congestion in the chest\"  - triage note of 9/7/23  Asa spoke with RN last week re: URI symptoms.   He is on day 11 now and continues to feel congestion in his head, adding \"the sinus pressure in my forehead is pretty bad actually.\"   He has congestion in his chest and has been coughing but doesn't feel that he is able to clear the mucus completely, and also feels a \"burning sensation\" in the center of his chest/rib cage.  He also reports chest fullness/pressure.  Has been taking Flonase, Sudafed and Advil for symptom management.  Today  - noted allergies to penicillins and azithromycin  - mostly fatigue, some \"burning\" in his chest when he walks  - not so much head congestion        Review of Systems   Constitutional, HEENT, cardiovascular, pulmonary, gi and gu systems are negative, except as otherwise noted.      Objective    /78 (BP Location: Right " arm, Patient Position: Sitting, Cuff Size: Adult Regular)   Pulse 95   Temp 98  F (36.7  C) (Skin)   SpO2 97%   There is no height or weight on file to calculate BMI.  Physical Exam   GENERAL: healthy, alert and no distress  NECK: no adenopathy, no asymmetry, masses, or scars and thyroid normal to palpation  RESP: cough with deep inhalation; otherwise, lungs clear to auscultation - no rales, rhonchi or wheezes  CV: regular rate and rhythm, normal S1 S2, no S3 or S4, no murmur, click or rub, no peripheral edema and peripheral pulses strong  MS: no gross musculoskeletal defects noted, no edema

## 2023-09-07 NOTE — TELEPHONE ENCOUNTER
"Asa spoke with RN last week re: URI symptoms.   He is on day 11 now and continues to feel congestion in his head, adding \"the sinus pressure in my forehead is pretty bad actually.\"   He has congestion in his chest and has been coughing but doesn't feel that he is able to clear the mucus completely, and also feels a \"burning sensation\" in the center of his chest/rib cage.  He also reports chest fullness/pressure.  Has been taking Flonase, Sudafed and Advil for symptom management.  Scheduled with Dr. Chi on 9/8/2023.    MADELAINE Srivastava, RN  09/07/23, 12:17 PM      "

## 2023-09-07 NOTE — TELEPHONE ENCOUNTER
Who is calling? Patient     Reason for Call: Ongoing respiratory symptoms, cough and congested in the chest

## 2023-09-08 ENCOUNTER — OFFICE VISIT (OUTPATIENT)
Dept: FAMILY MEDICINE | Facility: CLINIC | Age: 43
End: 2023-09-08
Payer: COMMERCIAL

## 2023-09-08 VITALS
TEMPERATURE: 98 F | OXYGEN SATURATION: 97 % | SYSTOLIC BLOOD PRESSURE: 128 MMHG | DIASTOLIC BLOOD PRESSURE: 78 MMHG | HEART RATE: 95 BPM

## 2023-09-08 DIAGNOSIS — J06.9 UPPER RESPIRATORY TRACT INFECTION, UNSPECIFIED TYPE: Primary | ICD-10-CM

## 2023-09-08 RX ORDER — CEPHALEXIN 500 MG/1
500 CAPSULE ORAL 2 TIMES DAILY
Qty: 14 CAPSULE | Refills: 0 | Status: SHIPPED | OUTPATIENT
Start: 2023-09-08 | End: 2023-11-01

## 2023-09-08 RX ORDER — PREDNISONE 20 MG/1
40 TABLET ORAL DAILY
Qty: 10 TABLET | Refills: 0 | Status: SHIPPED | OUTPATIENT
Start: 2023-09-08 | End: 2023-11-01

## 2023-09-08 NOTE — NURSING NOTE
43 year old    Chief Complaint   Patient presents with    URI     On day 12 of sx, still coughing (not productive), sinus pressure, and fatigued, feels mucus stuck in lungs and is experiencing burning when he cough            Blood pressure 128/78, pulse 95, temperature 98  F (36.7  C), temperature source Skin, SpO2 97 %. There is no height or weight on file to calculate BMI.    Patient Active Problem List   Diagnosis    Benign neoplasm of skin    History of thyroid disease    Ocular migraine - Both Eyes    Major Depression    Generalized anxiety disorder    Benign prostatic hyperplasia with weak urinary stream    Skin cancer screening    Verruca vulgaris    Seborrheic keratosis    Multiple melanocytic nevi              Wt Readings from Last 2 Encounters:   08/17/23 90.8 kg (200 lb 4 oz)   07/21/23 88.5 kg (195 lb)       BP Readings from Last 3 Encounters:   09/08/23 128/78   08/17/23 115/76   07/21/23 119/74                Current Outpatient Medications   Medication    alfuzosin ER (UROXATRAL) 10 MG 24 hr tablet    FLUoxetine (PROZAC) 10 MG capsule    ibuprofen (ADVIL/MOTRIN) 200 MG tablet    Probiotic Product (PROBIOTIC-10 PO)    psyllium (METAMUCIL/KONSYL) capsule     No current facility-administered medications for this visit.              Social History     Tobacco Use    Smoking status: Never    Smokeless tobacco: Never   Vaping Use    Vaping Use: Never used   Substance Use Topics    Alcohol use: Yes     Comment: 4-5 days per week one beer or glass of wine    Drug use: No              Health Maintenance Due   Topic Date Due    ADVANCE CARE PLANNING  Never done    Pneumococcal Vaccine: Pediatrics (0 to 5 Years) and At-Risk Patients (6 to 64 Years) (1 - PCV) Never done    INFLUENZA VACCINE (1) 09/01/2023            No results found for: PAP           September 8, 2023 10:47 AM

## 2023-09-15 PROBLEM — E78.5 HYPERLIPIDEMIA: Status: ACTIVE | Noted: 2018-02-08

## 2023-09-18 ENCOUNTER — OFFICE VISIT (OUTPATIENT)
Dept: CARDIOLOGY | Facility: CLINIC | Age: 43
End: 2023-09-18
Attending: INTERNAL MEDICINE
Payer: COMMERCIAL

## 2023-09-18 ENCOUNTER — PRE VISIT (OUTPATIENT)
Dept: CARDIOLOGY | Facility: CLINIC | Age: 43
End: 2023-09-18
Payer: COMMERCIAL

## 2023-09-18 VITALS
DIASTOLIC BLOOD PRESSURE: 75 MMHG | BODY MASS INDEX: 25.73 KG/M2 | WEIGHT: 200.4 LBS | OXYGEN SATURATION: 97 % | SYSTOLIC BLOOD PRESSURE: 117 MMHG | HEART RATE: 84 BPM

## 2023-09-18 DIAGNOSIS — I49.3 PVC'S (PREMATURE VENTRICULAR CONTRACTIONS): Primary | ICD-10-CM

## 2023-09-18 DIAGNOSIS — I49.3 PVC'S (PREMATURE VENTRICULAR CONTRACTIONS): ICD-10-CM

## 2023-09-18 PROCEDURE — 93010 ELECTROCARDIOGRAM REPORT: CPT | Performed by: INTERNAL MEDICINE

## 2023-09-18 PROCEDURE — 93005 ELECTROCARDIOGRAM TRACING: CPT | Mod: XU

## 2023-09-18 PROCEDURE — 99213 OFFICE O/P EST LOW 20 MIN: CPT | Performed by: INTERNAL MEDICINE

## 2023-09-18 PROCEDURE — 93248 EXT ECG>7D<15D REV&INTERPJ: CPT | Performed by: INTERNAL MEDICINE

## 2023-09-18 PROCEDURE — 99204 OFFICE O/P NEW MOD 45 MIN: CPT | Mod: 25 | Performed by: INTERNAL MEDICINE

## 2023-09-18 PROCEDURE — 93246 EXT ECG>7D<15D RECORDING: CPT

## 2023-09-18 ASSESSMENT — PAIN SCALES - GENERAL: PAINLEVEL: NO PAIN (0)

## 2023-09-18 NOTE — PROGRESS NOTES
Per Dr. Jean, patient to have Zio monitor placed.  Diagnosis: PVC's (I49.3)  Monitor placed: Yes  Patient Instructed: Yes  Patient verbalized understanding: Yes  Holter #[id number]    Monitor was placed by Melvina Childs  3:40 PM

## 2023-09-18 NOTE — PATIENT INSTRUCTIONS
You were seen in the Electrophysiology Clinic today by: Dr. Jean    Plan:   Medication Changes: None.     Labs/Tests Needed: 14 day ziopatch heart monitor placed today in clinic. Echocardiogram after ziopatch has been removed.     Follow up Visit: with Dr. Jean as needed    Further Instructions: Dr. Jean will contact you with testing results.       If you have further questions, please utilize [facility] [employer] to contact us.     Your Care Team:    EP Cardiology   Telephone Number     Nurse Line  Noman Schwarz RN    [phone]     For scheduling appointments:   Leatha   [phone]   For procedure scheduling:    Katie Graham     [phone]   For the Device Clinic (Pacemakers, ICDs, Loop Recorders)    During business hours: [phone]  After business hours:   [phone]- select option 4 and ask for job code [id number].       On-call cardiologist for after hours or on weekends:   [phone], option #4, and ask to speak to the on-call cardiologist.     Cardiovascular Clinic:   [address] [place] [address]. [place] [address]      As always, Thank you for trusting us with your health care needs!

## 2023-09-18 NOTE — LETTER
9/18/2023      RE: Asa Mann  [address] [place] [address]       Dear Colleague,    Thank you for the opportunity to participate in the care of your patient, Asa Mann, at the [facility] [place] [facility]. Please see a copy of my visit note below.    HPI:   Asa Mann is a 43 year old Male with a PMH of PVCs.  He was referred for a cardiology evaluation.    He was admitted to [facility] ED due to PVCs on 7/13/2023,  He has had 2 episodes lasting for 1.5 days since 2021.  He denied any triggers of PVCs.     He complained of chest discomfort and skipped beats and denied SOB and dizziness.    PAST MEDICAL HISTORY:  Past Medical History:   Diagnosis Date    Anxiety     Snoring        CURRENT MEDICATIONS:  Current Outpatient Medications   Medication Sig Dispense Refill    alfuzosin ER (UROXATRAL) 10 MG 24 hr tablet Take 1 tablet (10 mg) by mouth daily 90 tablet 3    cephALEXin (KEFLEX) 500 MG capsule Take 1 capsule (500 mg) by mouth 2 times daily 14 capsule 0    FLUoxetine (PROZAC) 10 MG capsule Take 1 capsule (10 mg) by mouth daily 90 capsule 3    ibuprofen (ADVIL/MOTRIN) 200 MG tablet Take 200 mg by mouth every 4 hours as needed for mild pain      predniSONE (DELTASONE) 20 MG tablet Take 2 tablets (40 mg) by mouth daily 10 tablet 0    Probiotic Product (PROBIOTIC-10 PO)       psyllium (METAMUCIL/KONSYL) capsule          PAST SURGICAL HISTORY:  Past Surgical History:   Procedure Laterality Date    APPENDECTOMY  2009       ALLERGIES:     Allergies   Allergen Reactions    Amoxicillin Other (See Comments)     Tops of legs turned red, hot and itchy    Zithromax [Azithromycin Dihydrate] Diarrhea    Penicillins Rash       FAMILY HISTORY:  - Premature coronary artery disease  - Atrial fibrillation  - Sudden cardiac death     SOCIAL HISTORY:  Social History     Tobacco Use    Smoking status: Never    Smokeless tobacco: Never    Vaping Use    Vaping Use: Never used   Substance Use Topics    Alcohol use: Yes     Comment: 4-5 days per week one beer or glass of wine    Drug use: No       ROS:   Constitutional: No fever, chills, or sweats. Weight stable.   ENT: No visual disturbance, ear ache, epistaxis, sore throat.   Cardiovascular: As per HPI.   Respiratory: No cough, hemoptysis.    GI: No nausea, vomiting, hematemesis, melena, or hematochezia.   : No hematuria.   Integument: Negative.   Psychiatric: Negative.   Hematologic:  Easy bruising, no easy bleeding.  Neuro: Negative.   Endocrinology: No significant heat or cold intolerance   Musculoskeletal: No myalgia.    Exam:  /75 (BP Location: Right arm, Patient Position: Chair, Cuff Size: Adult Large)   Pulse 84   Wt 90.9 kg (200 lb 6.4 oz)   SpO2 97%   BMI 25.73 kg/m    GENERAL APPEARANCE: healthy, alert and no distress  HEENT: no icterus, no xanthelasmas, normal pupil size and reaction, normal palate, mucosa moist, no central cyanosis  NECK: no adenopathy, no asymmetry, masses, or scars, thyroid normal to palpation and no bruits, JVP not elevated  RESPIRATORY: lungs clear to auscultation - no rales, rhonchi or wheezes, no use of accessory muscles, no retractions, respirations are unlabored, normal respiratory rate  CARDIOVASCULAR: regular rhythm, normal S1 with physiologic split S2, no S3 or S4 and no murmur, click or rub, precordium quiet with normal PMI.  ABDOMEN: soft, non tender, without hepatosplenomegaly, no masses palpable, bowel sounds normal, aorta not enlarged by palpation, no abdominal bruits  EXTREMITIES: peripheral pulses normal, no edema, no bruits  NEURO: alert and oriented to person/place/time, normal speech, gait and affect  VASC: Radial, femoral, dorsalis pedis and posterior tibialis pulses are normal in volumes and symmetric bilaterally. No bruits are heard.  SKIN: no ecchymoses, no rashes    Labs:  CBC RESULTS:   Lab Results   Component Value Date    WBC 4.9  03/14/2008    RBC 4.88 03/14/2008    HGB 16.0 03/14/2008    HCT 43.9 03/14/2008    MCV 90 03/14/2008    MCH 32.8 03/14/2008    MCHC 36.4 03/14/2008    RDW 11.6 03/14/2008     03/14/2008       BMP RESULTS:  Lab Results   Component Value Date     03/01/2023    .0 06/19/2013    POTASSIUM 4.7 03/01/2023    POTASSIUM 4.5 02/18/2022    POTASSIUM 3.8 06/19/2013    CHLORIDE 104 03/01/2023    CHLORIDE 104 02/18/2022    CHLORIDE 99.0 06/19/2013    CO2 28 03/01/2023    CO2 33 (H) 02/18/2022    CO2 31.0 06/19/2013    ANIONGAP 11 03/01/2023    ANIONGAP 3 02/18/2022    ANIONGAP 10 03/14/2008    GLC 93 03/01/2023    GLC 92 02/18/2022    .0 08/07/2017    BUN 12.3 03/01/2023    BUN 15 02/18/2022    BUN 15.0 06/19/2013    CR 1.04 03/01/2023    CR 0.8 06/19/2013    GFRESTIMATED >90 03/01/2023    GFRESTIMATED >90 03/14/2008    GFRESTBLACK >90 03/14/2008    TREASURE 10.0 03/01/2023    TREASURE 9.4 06/19/2013        INR RESULTS:  No results found for: INR    Procedures:  ECG on 7/13/2023: Reviewed.      ECG on 9/18/2023: Reviewed.        Assessment and Plan:   # PVCs  Symptomatic. Idiopathic. Rare episodes but last for 1.5 days. Likely originating from the papillary muscle.  I will schedule him to take TTE and Zio patch.  If they are WNL, no regular follow up will be required.    I spent a total of 45 min today to review the records, see the patient, and complete the documents.     CC  Patient Care Team:  Rita Rios MD as PCP - General (Internal Medicine)      Please do not hesitate to contact me if you have any questions/concerns.     Sincerely,     Jason Jean MD

## 2023-09-18 NOTE — PROGRESS NOTES
HPI:   Asa Mann is a 43 year old Male with a PMH of PVCs.  He was referred for a cardiology evaluation.    He was admitted to [facility] ED due to PVCs on 7/13/2023,  He has had 2 episodes lasting for 1.5 days since 2021.  He denied any triggers of PVCs.     He complained of chest discomfort and skipped beats and denied SOB and dizziness.    PAST MEDICAL HISTORY:  Past Medical History:   Diagnosis Date    Anxiety     Snoring        CURRENT MEDICATIONS:  Current Outpatient Medications   Medication Sig Dispense Refill    alfuzosin ER (UROXATRAL) 10 MG 24 hr tablet Take 1 tablet (10 mg) by mouth daily 90 tablet 3    cephALEXin (KEFLEX) 500 MG capsule Take 1 capsule (500 mg) by mouth 2 times daily 14 capsule 0    FLUoxetine (PROZAC) 10 MG capsule Take 1 capsule (10 mg) by mouth daily 90 capsule 3    ibuprofen (ADVIL/MOTRIN) 200 MG tablet Take 200 mg by mouth every 4 hours as needed for mild pain      predniSONE (DELTASONE) 20 MG tablet Take 2 tablets (40 mg) by mouth daily 10 tablet 0    Probiotic Product (PROBIOTIC-10 PO)       psyllium (METAMUCIL/KONSYL) capsule          PAST SURGICAL HISTORY:  Past Surgical History:   Procedure Laterality Date    APPENDECTOMY  2009       ALLERGIES:     Allergies   Allergen Reactions    Amoxicillin Other (See Comments)     Tops of legs turned red, hot and itchy    Zithromax [Azithromycin Dihydrate] Diarrhea    Penicillins Rash       FAMILY HISTORY:  - Premature coronary artery disease  - Atrial fibrillation  - Sudden cardiac death     SOCIAL HISTORY:  Social History     Tobacco Use    Smoking status: Never    Smokeless tobacco: Never   Vaping Use    Vaping Use: Never used   Substance Use Topics    Alcohol use: Yes     Comment: 4-5 days per week one beer or glass of wine    Drug use: No       ROS:   Constitutional: No fever, chills, or sweats. Weight stable.   ENT: No visual disturbance, ear ache, epistaxis, sore throat.   Cardiovascular: As per HPI.   Respiratory: No cough,  hemoptysis.    GI: No nausea, vomiting, hematemesis, melena, or hematochezia.   : No hematuria.   Integument: Negative.   Psychiatric: Negative.   Hematologic:  Easy bruising, no easy bleeding.  Neuro: Negative.   Endocrinology: No significant heat or cold intolerance   Musculoskeletal: No myalgia.    Exam:  /75 (BP Location: Right arm, Patient Position: Chair, Cuff Size: Adult Large)   Pulse 84   Wt 90.9 kg (200 lb 6.4 oz)   SpO2 97%   BMI 25.73 kg/m    GENERAL APPEARANCE: healthy, alert and no distress  HEENT: no icterus, no xanthelasmas, normal pupil size and reaction, normal palate, mucosa moist, no central cyanosis  NECK: no adenopathy, no asymmetry, masses, or scars, thyroid normal to palpation and no bruits, JVP not elevated  RESPIRATORY: lungs clear to auscultation - no rales, rhonchi or wheezes, no use of accessory muscles, no retractions, respirations are unlabored, normal respiratory rate  CARDIOVASCULAR: regular rhythm, normal S1 with physiologic split S2, no S3 or S4 and no murmur, click or rub, precordium quiet with normal PMI.  ABDOMEN: soft, non tender, without hepatosplenomegaly, no masses palpable, bowel sounds normal, aorta not enlarged by palpation, no abdominal bruits  EXTREMITIES: peripheral pulses normal, no edema, no bruits  NEURO: alert and oriented to person/place/time, normal speech, gait and affect  VASC: Radial, femoral, dorsalis pedis and posterior tibialis pulses are normal in volumes and symmetric bilaterally. No bruits are heard.  SKIN: no ecchymoses, no rashes    Labs:  CBC RESULTS:   Lab Results   Component Value Date    WBC 4.9 03/14/2008    RBC 4.88 03/14/2008    HGB 16.0 03/14/2008    HCT 43.9 03/14/2008    MCV 90 03/14/2008    MCH 32.8 03/14/2008    MCHC 36.4 03/14/2008    RDW 11.6 03/14/2008     03/14/2008       BMP RESULTS:  Lab Results   Component Value Date     03/01/2023    .0 06/19/2013    POTASSIUM 4.7 03/01/2023    POTASSIUM 4.5 02/18/2022     POTASSIUM 3.8 06/19/2013    CHLORIDE 104 03/01/2023    CHLORIDE 104 02/18/2022    CHLORIDE 99.0 06/19/2013    CO2 28 03/01/2023    CO2 33 (H) 02/18/2022    CO2 31.0 06/19/2013    ANIONGAP 11 03/01/2023    ANIONGAP 3 02/18/2022    ANIONGAP 10 03/14/2008    GLC 93 03/01/2023    GLC 92 02/18/2022    .0 08/07/2017    BUN 12.3 03/01/2023    BUN 15 02/18/2022    BUN 15.0 06/19/2013    CR 1.04 03/01/2023    CR 0.8 06/19/2013    GFRESTIMATED >90 03/01/2023    GFRESTIMATED >90 03/14/2008    GFRESTBLACK >90 03/14/2008    TREASURE 10.0 03/01/2023    TREASURE 9.4 06/19/2013        INR RESULTS:  No results found for: INR    Procedures:  ECG on 7/13/2023: Reviewed.      ECG on 9/18/2023: Reviewed.        Assessment and Plan:   # PVCs  Symptomatic. Idiopathic. Rare episodes but last for 1.5 days. Likely originating from the papillary muscle.  I will schedule him to take TTE and Zio patch.  If they are WNL, no regular follow up will be required.    I spent a total of 45 min today to review the records, see the patient, and complete the documents.     CC  Patient Care Team:  Maria Antonia Rios MD as PCP - General (Internal Medicine)  Prema Mai OD (Optometry)  Marleny Wilde CNP as Nurse Practitioner (Urology)  Penelope Mann RN as Registered Nurse  David Can OD as MD (Optometry)  Maria Antonia Rios MD as Assigned PCP  Alex Paulson MD as MD (Gastroenterology)  Charles Wolf MD as MD (Dermatology)  Beck Alexis MD as Assigned Surgical Provider  Jason Jean MD (Cardiovascular Disease)  MARIA ANTONIA RIOS

## 2023-09-18 NOTE — NURSING NOTE
Chief Complaint   Patient presents with    New Patient     September 18, 2023 - Dr. Jean: NEW EP self referral d/t PVC's     Vitals were taken, medications reconciled, and EKG was performed.    Juliano Christensen, EMT  2:58 PM

## 2023-09-19 LAB
ATRIAL RATE - MUSE: 89 BPM
DIASTOLIC BLOOD PRESSURE - MUSE: NORMAL MMHG
INTERPRETATION ECG - MUSE: NORMAL
P AXIS - MUSE: 72 DEGREES
PR INTERVAL - MUSE: 172 MS
QRS DURATION - MUSE: 86 MS
QT - MUSE: 348 MS
QTC - MUSE: 423 MS
R AXIS - MUSE: 80 DEGREES
SYSTOLIC BLOOD PRESSURE - MUSE: NORMAL MMHG
T AXIS - MUSE: 67 DEGREES
VENTRICULAR RATE- MUSE: 89 BPM

## 2023-10-10 ENCOUNTER — ANCILLARY PROCEDURE (OUTPATIENT)
Dept: CARDIOLOGY | Facility: CLINIC | Age: 43
End: 2023-10-10
Attending: INTERNAL MEDICINE
Payer: COMMERCIAL

## 2023-10-10 DIAGNOSIS — I49.3 PVC'S (PREMATURE VENTRICULAR CONTRACTIONS): ICD-10-CM

## 2023-10-10 LAB — LVEF ECHO: NORMAL

## 2023-10-10 PROCEDURE — 93306 TTE W/DOPPLER COMPLETE: CPT | Performed by: STUDENT IN AN ORGANIZED HEALTH CARE EDUCATION/TRAINING PROGRAM

## 2023-10-31 ASSESSMENT — SLEEP AND FATIGUE QUESTIONNAIRES
HOW LIKELY ARE YOU TO NOD OFF OR FALL ASLEEP WHILE LYING DOWN TO REST IN THE AFTERNOON WHEN CIRCUMSTANCES PERMIT: MODERATE CHANCE OF DOZING
HOW LIKELY ARE YOU TO NOD OFF OR FALL ASLEEP WHILE SITTING AND TALKING TO SOMEONE: WOULD NEVER DOZE
HOW LIKELY ARE YOU TO NOD OFF OR FALL ASLEEP WHILE WATCHING TV: MODERATE CHANCE OF DOZING
HOW LIKELY ARE YOU TO NOD OFF OR FALL ASLEEP WHEN YOU ARE A PASSENGER IN A CAR FOR AN HOUR WITHOUT A BREAK: SLIGHT CHANCE OF DOZING
HOW LIKELY ARE YOU TO NOD OFF OR FALL ASLEEP WHILE SITTING AND READING: SLIGHT CHANCE OF DOZING
HOW LIKELY ARE YOU TO NOD OFF OR FALL ASLEEP IN A CAR, WHILE STOPPED FOR A FEW MINUTES IN TRAFFIC: WOULD NEVER DOZE
HOW LIKELY ARE YOU TO NOD OFF OR FALL ASLEEP WHILE SITTING INACTIVE IN A PUBLIC PLACE: WOULD NEVER DOZE
HOW LIKELY ARE YOU TO NOD OFF OR FALL ASLEEP WHILE SITTING QUIETLY AFTER LUNCH WITHOUT ALCOHOL: WOULD NEVER DOZE

## 2023-11-01 ENCOUNTER — VIRTUAL VISIT (OUTPATIENT)
Dept: SLEEP MEDICINE | Facility: CLINIC | Age: 43
End: 2023-11-01
Attending: INTERNAL MEDICINE
Payer: COMMERCIAL

## 2023-11-01 VITALS — HEIGHT: 74 IN | BODY MASS INDEX: 25.67 KG/M2 | WEIGHT: 200 LBS

## 2023-11-01 DIAGNOSIS — F41.9 ANXIETY AND DEPRESSION: ICD-10-CM

## 2023-11-01 DIAGNOSIS — R06.83 SNORING: ICD-10-CM

## 2023-11-01 DIAGNOSIS — G25.81 RESTLESS LEGS SYNDROME (RLS): ICD-10-CM

## 2023-11-01 DIAGNOSIS — Z72.821 INADEQUATE SLEEP HYGIENE: ICD-10-CM

## 2023-11-01 DIAGNOSIS — G47.9 SLEEP DISTURBANCE: ICD-10-CM

## 2023-11-01 DIAGNOSIS — F32.A ANXIETY AND DEPRESSION: ICD-10-CM

## 2023-11-01 DIAGNOSIS — R53.83 FATIGUE, UNSPECIFIED TYPE: ICD-10-CM

## 2023-11-01 DIAGNOSIS — R06.81 WITNESSED APNEIC SPELLS: ICD-10-CM

## 2023-11-01 DIAGNOSIS — E83.19 OTHER DISORDERS OF IRON METABOLISM: Primary | ICD-10-CM

## 2023-11-01 PROCEDURE — 99204 OFFICE O/P NEW MOD 45 MIN: CPT | Mod: VID | Performed by: INTERNAL MEDICINE

## 2023-11-01 ASSESSMENT — PAIN SCALES - GENERAL: PAINLEVEL: NO PAIN (0)

## 2023-11-01 NOTE — NURSING NOTE
Is the patient currently in the state of [place]? YES    Visit mode:VIDEO    If the visit is dropped, the patient can be reconnected by: VIDEO VISIT: Text to cell phone:   Telephone Information:   Mobile [phone]       Will anyone else be joining the visit? NO  (If patient encounters technical issues they should call [phone]    How would you like to obtain your AVS? MyChart    Are changes needed to the allergy or medication list? No    Reason for visit: Consult    Ryan CORRAL

## 2023-11-01 NOTE — PATIENT INSTRUCTIONS
"          MY TREATMENT INFORMATION FOR SLEEP APNEA-  Asa Mann    DOCTOR : Albaro Linn MD    Am I having a home sleep study?  --->Watch the video for the device you are using:    -/drop off device-   https://www.[employer].com/watch?v=yGGFBdELGhk        Frequently asked questions:  1. What is Obstructive Sleep Apnea (EVY)? EVY is the most common type of sleep apnea. Apnea means, \"without breath.\"  Apnea is most often caused by narrowing or collapse of the upper airway as muscles relax during sleep.   Almost everyone has occasional apneas. Most people with sleep apnea have had brief interruptions at night frequently for many years.  The severity of sleep apnea is related to how frequent and severe the events are.   2. What are the consequences of EVY? Symptoms include: feeling sleepy during the day, snoring loudly, gasping or stopping of breathing, trouble sleeping, and occasionally morning headaches or heartburn at night.  Sleepiness can be serious and even increase the risk of falling asleep while driving. Other health consequences may include development of high blood pressure and other cardiovascular disease in persons who are susceptible. Untreated EVY  can contribute to heart disease, stroke and diabetes.   3. What are the treatment options? In most situations, sleep apnea is a lifelong disease that must be managed with daily therapy. Medications are not effective for sleep apnea and surgery is generally not considered until other therapies have been tried. Your treatment is your choice . Continuous Positive Airway (CPAP) works right away and is the therapy that is effective in nearly everyone. An oral device to hold your jaw forward is usually the next most reliable option. Other options include postioning devices (to keep you off your back), weight loss, and surgery including a tongue pacing device. There is more detail about some of these options below.  4. Are my sleep " studies covered by insurance? Although we will request verification of coverage, we advise you also check in advance of the study to ensure there is coverage.    Important tips for those choosing CPAP and similar devices  For new devices, sign up for device REMBERTO to monitor your device for your followup visits  We encourage you to utilize the [employer] remberto or website ([employer] web (resmed.com) ) to monitor your therapy progress and share the data with your healthcare team when you discuss your sleep apnea.                                                    Know your equipment:  CPAP is continuous positive airway pressure that prevents obstructive sleep apnea by keeping the throat from collapsing while you are sleeping. In most cases, the device is  smart  and can slowly self-adjusts if your throat collapses and keeps a record every day of how well you are treated-this information is available to you and your care team.  BPAP is bilevel positive airway pressure that keeps your throat open and also assists each breath with a pressure boost to maintain adequate breathing.  Special kinds of BPAP are used in patients who have inadequate breathing from lung or heart disease. In most cases, the device is  smart  and can slowly self-adjusts to assist breathing. Like CPAP, the device keeps a record of how well you are treated.  Your mask is your connection to the device. You get to choose what feels most comfortable and the staff will help to make sure if fits. Here: are some examples of the different masks that are available:       Key points to remember on your journey with sleep apnea:  Sleep study.  PAP devices often need to be adjusted during a sleep study to show that they are effective and adjusted right.  Good tips to remember: Try wearing just the mask during a quiet time during the day so your body adapts to wearing it. A humidifier is recommended for comfort in most cases to prevent drying of your nose and  throat. Allergy medication from your provider may help you if you are having nasal congestion.  Getting settled-in. It takes more than one night for most of us to get used to wearing a mask. Try wearing just the mask during a quiet time during the day so your body adapts to wearing it. A humidifier is recommended for comfort in most cases. Our team will work with you carefully on the first day and will be in contact within 4 days and again at 2 and 4 weeks for advice and remote device adjustments. Your therapy is evaluated by the device each day.   Use it every night. The more you are able to sleep naturally for 7-8 hours, the more likely you will have good sleep and to prevent health risks or symptoms from sleep apnea. Even if you use it 4 hours it helps. Occasionally all of us are unable to use a medical therapy, in sleep apnea, it is not dangerous to miss one night.   Communicate. Call our skilled team on the number provided on the first day if your visit for problems that make it difficult to wear the device. Over 2 out of 3 patients can learn to wear the device long-term with help from our team. Remember to call our team or your sleep providers if you are unable to wear the device as we may have other solutions for those who cannot adapt to mask CPAP therapy. It is recommended that you sleep your sleep provider within the first 3 months and yearly after that if you are not having problems.   Use it for your health. We encourage use of CPAP masks during daytime quiet periods to allow your face and brain to adapt to the sensation of CPAP so that it will be a more natural sensation to awaken to at night or during naps. This can be very useful during the first few weeks or months of adapting to CPAP though it does not help medically to wear CPAP during wakefulness and  should not be used as a strategy just to meet guidelines.  Take care of your equipment. Make sure you clean your mask and tubing using directions  every day and that your filter and mask are replaced as recommended or if they are not working.     BESIDES CPAP, WHAT OTHER THERAPIES ARE THERE?    Positioning Device  Positioning devices are generally used when sleep apnea is mild and only occurs on your back.This example shows a pillow that straps around the waist. It may be appropriate for those whose sleep study shows milder sleep apnea that occurs primarily when lying flat on one's back. Preliminary studies have shown benefit but effectiveness at home may need to be verified by a home sleep test. These devices are generally not covered by medical insurance.  Examples of devices that maintain sleeping on the back to prevent snoring and mild sleep apnea.    Belt type body positioner  http://[employer]ok[employer]x/    Electronic reminder  http://nightshifttherapy.com/            Oral Appliance  What is oral appliance therapy?  An oral appliance device fits on your teeth at night like a retainer used after having braces. The device is made by a specialized dentist and requires several visits over 1-2 months before a manufactured device is made to fit your teeth and is adjusted to prevent your sleep apnea. Once an oral device is working properly, snoring should be improved. A home sleep test may be recommended at that time if to determine whether the sleep apnea is adequately treated.       Some things to remember:  -Oral devices are often, but not always, covered by your medical insurance. Be sure to check with your insurance provider.   -If you are referred for oral therapy, you will be given a list of specialized dentists to consider or you may choose to visit the Web site of the American Academy of Dental Sleep Medicine  -Oral devices are less likely to work if you have severe sleep apnea or are extremely overweight.     More detailed information  An oral appliance is a small acrylic device that fits over the upper and lower teeth  (similar to a retainer or a mouth  guard). This device slightly moves jaw forward, which moves the base of the tongue forward, opens the airway, improves breathing for effective treat snoring and obstructive sleep apnea in perhaps 7 out of 10 people .  The best working devices are custom-made by a dental device  after a mold is made of the teeth 1, 2, 3.  When is an oral appliance indicated?  Oral appliance therapy is recommended as a first-line treatment for patients with primary snoring, mild sleep apnea, and for patients with moderate sleep apnea who prefer appliance therapy to use of CPAP4, 5. Severity of sleep apnea is determined by sleep testing and is based on the number of respiratory events per hour of sleep.   How successful is oral appliance therapy?  The success rate of oral appliance therapy in patients with mild sleep apnea is 75-80% while in patients with moderate sleep apnea it is 50-70%. The chance of success in patients with severe sleep apnea is 40-50%. The research also shows that oral appliances have a beneficial effect on the cardiovascular health of EVY patients at the same magnitude as CPAP therapy7.  Oral appliances should be a second-line treatment in cases of severe sleep apnea, but if not completely successful then a combination therapy utilizing CPAP plus oral appliance therapy may be effective. Oral appliances tend to be effective in a broad range of patients although studies show that the patients who have the highest success are females, younger patients, those with milder disease, and less severe obesity. 3, 6.   Finding a dentist that practices dental sleep medicine  Specific training is available through the American Academy of Dental Sleep Medicine for dentists interested in working in the field of sleep. To find a dentist who is educated in the field of sleep and the use of oral appliances, near you, visit the Web site of the American Academy of Dental Sleep Medicine.    References  1. Jaime et  al. Objectively measured vs self-reported compliance during oral appliance therapy for sleep-disordered breathing. Chest 2013; 144(5): 3015-4898.  2. Espinoza, et al. Objective measurement of compliance during oral appliance therapy for sleep-disordered breathing. Thorax 2013; 68(1): 91-96.  3. Lee et al. Mandibular advancement devices in 620 men and women with EVY and snoring: tolerability and predictors of treatment success. Chest 2004; 125: 4405-6526.  4. Daya et al. Oral appliances for snoring and EVY: a review. Sleep 2006; 29: 244-262.  5. Inder et al. Oral appliance treatment for EVY: an update. J Clin Sleep Med 2014; 10(2): 215-227.  6. Ana et al. Predictors of OSAH treatment outcome. J Dent Res 2007; 86: 8143-5693.      Weight Loss:    Weight loss is a long-term strategy that may improve sleep apnea in some patients.    Weight management is a personal decision and the decision should be based on your interest and the potential benefits.  If you are interested in exploring weight loss strategies, the following discussion covers the impact on weight loss on sleep apnea and the approaches that may be successful.    Being overweight does not necessarily mean you will have health consequences.  Those who have BMI over 35 or over 27 with existing medical conditions carries greater risk.   Weight loss decreases severity of sleep apnea in most people with obesity. For those with mild obesity who have developed snoring with weight gain, even 15-30 pound weight loss can improve and occasionally eliminate sleep apnea.  Structured and life-long dietary and health habits are necessary to lose weight and keep healthier weight levels.     Though there may be significant health benefits from weight loss, long-term weight loss is very difficult to achieve- studies show success with dietary management in less than 10% of people. In addition, substantial weight loss may require years of dietary control  and may be difficult if patients have severe obesity. In these cases, surgical management may be considered.  Finally, older individuals who have tolerated obesity without health complications may be less likely to benefit from weight loss strategies.      [unfilled]    Surgery:    Surgery for obstructive sleep apnea is considered generally only when other therapies fail to work. Surgery may be discussed with you if you are having a difficult time tolerating CPAP and or when there is an abnormal structure that requires surgical correction.  Nose and throat surgeries often enlarge the airway to prevent collapse.  Most of these surgeries create pain for 1-2 weeks and up to half of the most common surgeries are not effective throughout life.  You should carefully discuss the benefits and drawbacks to surgery with your sleep provider and surgeon to determine if it is the best solution for you.   More information  Surgery for EVY is directed at areas that are responsible for narrowing or complete obstruction of the airway during sleep.  There are a wide range of procedures available to enlarge and/or stabilize the airway to prevent blockage of breathing in the three major areas where it can occur: the palate, tongue, and nasal regions.  Successful surgical treatment depends on the accurate identification of the factors responsible for obstructive sleep apnea in each person.  A personalized approach is required because there is no single treatment that works well for everyone.  Because of anatomic variation, consultation with an examination by a sleep surgeon is a critical first step in determining what surgical options are best for each patient.  In some cases, examination during sedation may be recommended in order to guide the selection of procedures.  Patients will be counseled about risks and benefits as well as the typical recovery course after surgery. Surgery is typically not a cure for a person s EVY.  However,  surgery will often significantly improve one s EVY severity (termed  success rate ).  Even in the absence of a cure, surgery will decrease the cardiovascular risk associated with OSA7; improve overall quality of life8 (sleepiness, functionality, sleep quality, etc).      Palate Procedures:  Patients with EVY often have narrowing of their airway in the region of their tonsils and uvula.  The goals of palate procedures are to widen the airway in this region as well as to help the tissues resist collapse.  Modern palate procedure techniques focus on tissue conservation and soft tissue rearrangement, rather than tissue removal.  Often the uvula is preserved in this procedure. Residual sleep apnea is common in patient after pharyngoplasty with an average reduction in sleep apnea events of 33%2.      Tongue Procedures:  ExamWhile patients are awake, the muscles that surround the throat are active and keep this region open for breathing. These muscles relax during sleep, allowing the tongue and other structures to collapse and block breathing.  There are several different tongue procedures available.  Selection of a tongue base procedure depends on characteristics seen on physical exam.  Generally, procedures are aimed at removing bulky tissues in this area or preventing the back of the tongue from falling back during sleep.  Success rates for tongue surgery range from 50-62%3.    Hypoglossal Nerve Stimulation:  Hypoglossal nerve stimulation has recently received approval from the [place] Food and Drug Administration for the treatment of obstructive sleep apnea.  This is based on research showing that the system was safe and effective in treating sleep apnea6.  Results showed that the median AHI score decreased 68%, from 29.3 to 9.0. This therapy uses an implant system that senses breathing patterns and delivers mild stimulation to airway muscles, which keeps the airway open during sleep.  The system consists of  three fully implanted components: a small generator (similar in size to a pacemaker), a breathing sensor, and a stimulation lead.  Using a small handheld remote, a patient turns the therapy on before bed and off upon awakening.    Candidates for this device must be greater than 18 years of age, have moderate to severe EVY (AHI between 15-65), BMI less than 35, have tried CPAP/oral appliance for at least 8 weeks without success, and have appropriate upper airway anatomy (determined by a sleep endoscopy performed by Dr. Charli Sandoval).    Hypoglossal Nerve Stimulation Pathway:    The sleep surgeon s office will work with the patient through the insurance prior-authorization process (including communications and appeals).    Nasal Procedures:  Nasal obstruction can interfere with nasal breathing during the day and night.  Studies have shown that relief of nasal obstruction can improve the ability of some patients to tolerate positive airway pressure therapy for obstructive sleep apnea1.  Treatment options include medications such as nasal saline, topical corticosteroid and antihistamine sprays, and oral medications such as antihistamines or decongestants. Non-surgical treatments can include external nasal dilators for selected patients. If these are not successful by themselves, surgery can improve the nasal airway either alone or in combination with these other options.      Combination Procedures:  Combination of surgical procedures and other treatments may be recommended, particularly if patients have more than one area of narrowing or persistent positional disease.  The success rate of combination surgery ranges from 66-80%2,3.    References  Carlene ESPINOSA. The Role of the Nose in Snoring and Obstructive Sleep Apnoea: An Update.  Eur Arch Otorhinolaryngol. 2011; 268: 1365-73.   Jair SM; Pranav JA; Ruperto JR; Pallanch JF; Patty GARCIA; Jamie CHRISTINE; Amber BORJA. Surgical modifications of the upper airway for obstructive sleep  apnea in adults: a systematic review and meta-analysis. SLEEP 2010;33(10):2621-8627. Gurvinder PAINTER. Hypopharyngeal surgery in obstructive sleep apnea: an evidence-based medicine review.  Arch Otolaryngol Head Neck Surg. 2006 Feb;132(2):206-13.  Pj YKAVEH, Yanet Y, Irving CHAIM. The efficacy of anatomically based multilevel surgery for obstructive sleep apnea. Otolaryngol Head Neck Surg. 2003 Oct;129(4):327-35.  Kezirian E, Goldberg A. Hypopharyngeal Surgery in Obstructive Sleep Apnea: An Evidence-Based Medicine Review. Arch Otolaryngol Head Neck Surg. 2006 Feb;132(2):206-13.  Elis ZUNIGA et al. Upper-Airway Stimulation for Obstructive Sleep Apnea.  N Engl J Med. 2014 Jan 9;370(2):139-49.  Mechelle Y et al. Increased Incidence of Cardiovascular Disease in Middle-aged Men with Obstructive Sleep Apnea. Am J Respir Crit Care Med; 2002 166: 159-165  Cam EM et al. Studying Life Effects and Effectiveness of Palatopharyngoplasty (SLEEP) study: Subjective Outcomes of Isolated Uvulopalatopharyngoplasty. Otolaryngol Head Neck Surg. 2011; 144: 623-631.        WHAT IF I ONLY HAVE SNORING?    Mandibular advancement devices, lateral sleep positioning, long-term weight loss and treatment of nasal allergies have been shown to improve snoring.  Exercising tongue muscles with a game (https://apps.[employer].ReFashioner/us/remberto/soundly-reduce-snoring/ee4287018917) or stimulating the tongue during the day with a device (https://doi.org/10.3390/yzr81099592) have improved snoring in some individuals.    Remember to Drive Safe... Drive Alive     Sleep health profoundly affects your health, mood, and your safety.  Thirty three percent of the population (one in three of us) is not getting enough sleep and many have a sleep disorder. Not getting enough sleep or having an untreated / undertreated sleep condition may make us sleepy without even knowing it. In fact, our driving could be dramatically impaired due to our sleep health. As your provider, here are some  things I would like you to know about driving:     Here are some warning signs for impairment and dangerous drowsy driving:              -Having been awake more than 16 hours               -Looking tired               -Eyelid drooping              -Head nodding (it could be too late at this point)              -Driving for more than 30 minutes     Some things you could do to make the driving safer if you are experiencing some drowsiness:              -Stop driving and rest              -Call for transportation              -Make sure your sleep disorder is adequately treated     Some things that have been shown NOT to work when experiencing drowsiness while driving:              -Turning on the radio              -Opening windows              -Eating any  distracting  /  entertaining  foods (e.g., sunflower seeds, candy, or any other)              -Talking on the phone      Your decision may not only impact your life, but also the life of others. Please, remember to drive safe for yourself and all of us.

## 2023-11-01 NOTE — PROGRESS NOTES
Virtual Visit Details    Type of service:  Video Visit     Originating Location (pt. Location): Home    Distant Location (provider location):  Off-site  Platform used for Video Visit: [facility]    Outpatient Sleep Medicine Consultation:      Name: Asa Mann MRN# [id number]   Age: 43 year old YOB: 1980     Date of Consultation: November 1, 2023  Consultation is requested by: Rita Rios MD  [address] [place] [id number] Rita Rios  Primary care provider: Rita Rios       Reason for Sleep Consult:     Asa Mann is sent by Rita Rios for a sleep consultation regarding obtaining evaluation for possible sleep apnea and RLS.    Patient s Reason for visit  Asa Mann main reason for visit: Snoring and possible sleep apnea and restless leg syndrome  Patient states problem(s) started: All adulthood  Asa Mann's goals for this visit: Discuss options        Assessment and Plan:     Snoring, observed apneas, non-restorative sleep, fatigue. Possible Obstructive sleep apnea  STOP BANG score:4/8 (snoring, tiredness, observed apneas during sleep and male gender)  HST/ Polysomnography reviewed.  Orders generated in [facility] for for HST to evaluate for sleep disordered breathing.  If home sleep study is negative for sleep disordered breathing, we will consider obtaining in-lab sleep study for further evaluation and he was agreeable with the plan.  Obstructive sleep apnea and consequences of untreated sleep apnea were reviewed.  Information provided regarding treatment options for EVY.    Restless legs syndrome(intermittent):will check fasting serum iron, ferritin level, TIBC, percent iron saturation and consider iron supplementation if ferritin is < 75 ng/ml.  Encouraged to try non-pharmacological treatments as well - hot bath/shower, stretching, heating pads, avoiding caffeine/alcohol/chocolate prior to bed.  Patient instructed to let me  "know if the RLS symptoms were to increase in frequency or severity.    Sleep hygiene: We discussed optimizing sleep hygiene including avoiding activities such as reading in bed, watching TV in bed, using phone/computer other electronic devices in bed.  He was also instructed to avoid consumption of alcohol within 2 to 3 hours before bed.    Anxiety/depression: Patient denies any symptoms of depression currently. He reports that anxiety is  controlled with current dose of Prozac.  Recommend continue follow-up with his primary care provider and therapist for optimizing the management of anxiety/depression.    Encouraged to follow regular exercise and healthy diet.    Patient was strongly advised to avoid driving, operating any heavy machinery or other hazardous situations while drowsy or sleepy.  Patient was counseled on the importance of driving while alert, to pull over if drowsy, or nap before getting into the vehicle if sleepy.     Plan is to communicate the results of the HST with the patient via [facility]    Orders Placed This Encounter   Procedures    HST-Home Sleep Apnea Test - Noxturnal Returnable    Ferritin    Iron and Iron Binding Capacity       Summary Counseling:    Sleep Testing Reviewed  Obstructive Sleep Apnea Reviewed  Complications of Untreated Sleep Apnea Reviewed      Medical Decision-making:   Educational materials provided in instructions    CC: Rita Rios     The above note was dictated using voice recognition software. Although reviewed after completion, some word and grammatical error may remain . Please contact the author for any clarifications.       \" Total time spent was 58 minutes for this appointment on this date of service which include time spent before, during and after the visit for chart review, patient care, counseling and coordination of care including documentation.\"    Albaro Linn MD  [facility]  [address] , " [place] [address]            History of Present Illness:     Past Sleep Evaluations: Overnight oximetry 15 years ago-no abnormality noted    He doesn't think he has a problem with sleep apnea if he sleeps on his sides/stomach.   SLEEP-WAKE SCHEDULE:     Work/School Days: Patient goes to school/work: Yes   Usually gets into bed at 11pm  Takes patient about 5-15 minutes to fall asleep  Has trouble falling asleep 1 nights per week  Wakes up in the middle of the night 0 times.  Wakes up due to Snorting self awake  He has trouble falling back asleep 0 times a week.   It usually takes 5-10 mins to get back to sleep  Patient is usually up at 7:30am  Uses alarm: Yes    Weekends/Non-work Days/All Other Days:  Usually gets into bed at 11pm   Takes patient about 5-15 mins to fall asleep  Patient is usually up at 8am  Uses alarm: No    He does not always feel rested upon awakening from sleep. He reports occasional fatigue/EDS  Sleep Need  Patient gets  7.5 hours sleep on average   Patient thinks he needs about 8-9 hours sleep    Asa Mann prefers to sleep in this position(s): Side;Stomach   Patient states they do the following activities in bed: Read;Watch TV;Use phone, computer, or tablet    Naps  Patient takes a purposeful nap 0 times a week and naps are usually I rarely nap in duration  He feels better after a nap: No  He dozes off unintentionally Rarely   Patient has had a driving accident or near-miss due to sleepiness/drowsiness: No      SLEEP DISRUPTIONS:    Breathing/Snoring  Patient snores:Yes, mostly on back, occasionally on sides   Other people complain about his snoring: Yes  Patient has been told he stops breathing in his sleep:Yes,only while sleeping on his back  He has issues with the following: Stuffy nose when you wake up    Movement:  Symptoms occur 1-2 times a month,  Usually occurs at night after 9:30 PM when he is sitting down and relaxing  but he is able to go to bed and fall asleep-symptoms do  not interfere with his sleep.  Patient gets pain, discomfort, with an urge to move:  Yes  It happens when he is resting:  Yes  It happens more at night:  Yes  Patient has been told he kicks his legs at night:  No     Behaviors in Sleep:  Asa Mann has experienced the following behaviors while sleeping: Teeth grinding-dentist reported and was never told it was a major concern  He denies sleepwalking, sleep eating, sleep talking or dream enactment behavior.  He has experienced sudden muscle weakness during the day: No      Is there anything else you would like your sleep provider to know: Restless leg syndrome?        CAFFEINE AND OTHER SUBSTANCES:    Patient consumes caffeinated beverages per day:  2-3  Last caffeine use is usually: 3:30pm  List of any prescribed or over the counter stimulants that patient takes:    List of any prescribed or over the counter sleep medication patient takes:    List of previous sleep medications that patient has tried:    Patient drinks alcohol to help them sleep: No  Patient drinks alcohol near bedtime: Yes    Family History:  Patient has a family member been diagnosed with a sleep disorder: No            SCALES:    EPWORTH SLEEPINESS SCALE         10/31/2023     9:27 AM    [place] Sleepiness Scale ( KRISTY Loaiza  [id number]<br>ESS - [place]/English - Final version - 21 Nov 07 - [facility] [place].)   Sitting and reading Slight chance of dozing   Watching TV Moderate chance of dozing   Sitting, inactive in a public place (e.g. a theatre or a meeting) Would never doze   As a passenger in a car for an hour without a break Slight chance of dozing   Lying down to rest in the afternoon when circumstances permit Moderate chance of dozing   Sitting and talking to someone Would never doze   Sitting quietly after a lunch without alcohol Would never doze   In a car, while stopped for a few minutes in traffic Would never doze   [place] Score (MC) 6   [place] Score (Sleep) 6  "        INSOMNIA SEVERITY INDEX (CJ)          10/31/2023     9:18 AM   Insomnia Severity Index (CJ)   Difficulty falling asleep 1   Difficulty staying asleep 1   Problems waking up too early 0   How SATISFIED/DISSATISFIED are you with your CURRENT sleep pattern? 2   How NOTICEABLE to others do you think your sleep problem is in terms of impairing the quality of your life? 1   How WORRIED/DISTRESSED are you about your current sleep problem? 1   To what extent do you consider your sleep problem to INTERFERE with your daily functioning (e.g. daytime fatigue, mood, ability to function at work/daily chores, concentration, memory, mood, etc.) CURRENTLY? 2   CJ Total Score 8       Guidelines for Scoring/Interpretation:  Total score categories:  0-7 = No clinically significant insomnia   8-14 = Subthreshold insomnia   15-21 = Clinical insomnia (moderate severity)  22-28 = Clinical insomnia (severe)  Used via courtesy of www.nap- Naturally Attached Parents.va.gov with permission from Kel Lisa PhD., [facility]      STOP BAN2023    12:32 PM   STOP BANG Questionnaire (  2008, the American Society of Anesthesiologists, Inc. [employer].)   BMI Clinic: 25.68         GAD7        2023     4:22 PM   NAVYA-7    1. Feeling nervous, anxious, or on edge 1   2. Not being able to stop or control worrying 0   3. Worrying too much about different things 0   4. Trouble relaxing 1   5. Being so restless that it is hard to sit still 0   6. Becoming easily annoyed or irritable 0   7. Feeling afraid, as if something awful might happen 0   NAVYA-7 Total Score 2   If you checked any problems, how difficult have they made it for you to do your work, take care of things at home, or get along with other people? Not difficult at all         CAGE-AID         No data to display                CAGE-AID reprinted with permission from the [place] Medical Journal, HANSEL Esparza. and ENA Piper, \"Conjoint screening " "questionnaires for alcohol and drug abuse\" [place] VetCompare Journal 94: 135-140, 1995.      PATIENT HEALTH QUESTIONNAIRE-9 (PHQ - 9)        8/17/2023     4:22 PM   PHQ-9 ([employer])   1.  Little interest or pleasure in doing things 0   2.  Feeling down, depressed, or hopeless 0   3.  Trouble falling or staying asleep, or sleeping too much 1   4.  Feeling tired or having little energy 1   5.  Poor appetite or overeating 0   6.  Feeling bad about yourself - or that you are a failure or have let yourself or your family down 0   7.  Trouble concentrating on things, such as reading the newspaper or watching television 1   8.  Moving or speaking so slowly that other people could have noticed. Or the opposite - being so fidgety or restless that you have been moving around a lot more than usual 0   9.  Thoughts that you would be better off dead, or of hurting yourself in some way 0   PHQ-9 Total Score 3   If you checked off any problems, how difficult have these problems made it for you to do your work, take care of things at home, or get along with other people? Somewhat difficult   6.  Feeling bad about yourself 0   7.  Trouble concentrating 1   8.  Moving slowly or restless 0   9.  Suicidal or self-harm thoughts 0   Difficulty at work, home, or with people Somewhat difficult       Developed by Kirstin Marmolejo, Hamida López, Yeyo Olivares and colleagues, with an educational francheska from [employer]. No permission required to reproduce, translate, display or distribute.        Allergies:    Allergies   Allergen Reactions    Amoxicillin Other (See Comments)     Tops of legs turned red, hot and itchy    Zithromax [Azithromycin Dihydrate] Diarrhea    Penicillins Rash       Medications:    Current Outpatient Medications   Medication Sig Dispense Refill    alfuzosin ER (UROXATRAL) 10 MG 24 hr tablet Take 1 tablet (10 mg) by mouth daily 90 tablet 3    FLUoxetine (PROZAC) 10 MG capsule Take 1 capsule (10 mg) by mouth " daily 90 capsule 3    ibuprofen (ADVIL/MOTRIN) 200 MG tablet Take 200 mg by mouth every 4 hours as needed for mild pain      Probiotic Product (PROBIOTIC-10 PO)       psyllium (METAMUCIL/KONSYL) capsule       cephALEXin (KEFLEX) 500 MG capsule Take 1 capsule (500 mg) by mouth 2 times daily (Patient not taking: Reported on 9/18/2023) 14 capsule 0    predniSONE (DELTASONE) 20 MG tablet Take 2 tablets (40 mg) by mouth daily (Patient not taking: Reported on 9/18/2023) 10 tablet 0       Problem List:  Patient Active Problem List    Diagnosis Date Noted    Skin cancer screening 03/04/2023     Priority: Medium    Verruca vulgaris 03/04/2023     Priority: Medium    Seborrheic keratosis 03/04/2023     Priority: Medium    Multiple melanocytic nevi 03/04/2023     Priority: Medium    Benign prostatic hyperplasia with weak urinary stream 01/11/2021     Priority: Medium    Hyperlipidemia 02/08/2018     Priority: Medium     Last Assessment & Plan: Formatting of this note might be different from the original. LDL was 160 8/2017. Fasting glucose wnl at that time - PANEL LIPID; Future      Major Depression 10/15/2015     Priority: Medium    Generalized anxiety disorder 10/15/2015     Priority: Medium    Ocular migraine - Both Eyes 02/12/2015     Priority: Medium    Benign neoplasm of skin 06/16/2012     Priority: Medium     Problem list name updated by automated process. Provider to review      History of thyroid disease 07/06/2005     Priority: Medium     RAIU in 2005 showed increased uptake 49%. He was not treated with medication          Past Medical/Surgical History:  Past Medical History:   Diagnosis Date    Anxiety     Snoring      Past Surgical History:   Procedure Laterality Date    APPENDECTOMY  2009       Social History:  Social History     Socioeconomic History    Marital status:      Spouse name: Not on file    Number of children: Not on file    Years of education: Not on file    Highest education level: Not on  file   Occupational History    Not on file   Tobacco Use    Smoking status: Never    Smokeless tobacco: Never   Vaping Use    Vaping Use: Never used   Substance and Sexual Activity    Alcohol use: Yes     Comment: 4-5 days per week one beer or glass of wine    Drug use: No    Sexual activity: Yes     Partners: Female   Other Topics Concern    Parent/sibling w/ CABG, MI or angioplasty before 65F 55M? Not Asked   Social History Narrative    Not on file     Social Determinants of Health     Financial Resource Strain: Not on file   Food Insecurity: Not on file   Transportation Needs: Not on file   Physical Activity: Not on file   Stress: Not on file   Social Connections: Not on file   Interpersonal Safety: Not on file   Housing Stability: Not on file       Family History:  Family History   Problem Relation Age of Onset    Glaucoma Mother     EYE* Mother         retinal bleed    Other - See Comments Mother 35        Lymes    Diabetes Father     Thyroid Disease Father         multinodular goiter    Lymphoma Father     Stomach Cancer Father 45    Thyroid Disease Sister         hashimoto    Hyperlipidemia Brother 47    Glaucoma Maternal Grandmother     Pancreatic Cancer Maternal Grandfather     Macular Degeneration Paternal Grandmother     Pancreatic Cancer Maternal Uncle     Bladder Cancer Paternal Uncle     Thyroid Disease Paternal Aunt     Glaucoma Other         maternal uncles    EYE* Other         ret bleed/ mat uncle       Review of Systems:  A complete review of systems reviewed by me is negative with the exeption of what has been mentioned in the history of present illness.  In the last TWO WEEKS have you experienced any of the following symptoms?  Fevers: No  Night Sweats: Yes  Weight Gain: No  Pain at Night: No  Double Vision: No  Changes in Vision: No  Difficulty Breathing through Nose: No  Sore Throat in Morning: No  Dry Mouth in the Morning: No  Shortness of Breath Lying Flat: No  Shortness of Breath With  "Activity: No  Awakening with Shortness of Breath: Yes  Increased Cough: No  Heart Racing at Night: Yes  Swelling in Feet or Legs: No  Diarrhea at Night: No  Heartburn at Night: No  Urinating More than Once at Night: No  Losing Control of Urine at Night: No  Joint Pains at Night: Yes  Headaches in Morning: No  Weakness in Arms or Legs: No  Depressed Mood: No  Anxiety: Yes (he sees a therapist, managed well with prozac)    Physical Examination:  Vitals: Ht 1.88 m (6' 2\")   Wt 90.7 kg (200 lb)   BMI 25.68 kg/m    BMI= Body mass index is 25.68 kg/m .       General: No apparent distress, appropriately groomed  Head: Normocephalic, atraumatic  Neck:Circumference: 15 3/4\" per pt report  Chest: No cough, no audible wheezing, able to talk in full sentences  Psych: coherent speech, normal rate and volume, able to articulate logical thoughts, able   to abstract reason, no tangential thoughts, no hallucinations   or delusions  His  affect is normal  Neuro:  Mental status: Alert and  Oriented X 3  Speech: normal            Data: All pertinent previous laboratory data reviewed     Recent Labs   Lab Test 03/01/23  1348 02/18/22  0952    140   POTASSIUM 4.7 4.5   CHLORIDE 104 104   CO2 28 33*   ANIONGAP 11 3   GLC 93 92   BUN 12.3 15   CR 1.04 1.01   TREASURE 10.0 9.6       No results for input(s): \"WBC\", \"RBC\", \"HGB\", \"HCT\", \"MCV\", \"MCH\", \"MCHC\", \"RDW\", \"PLT\" in the last 52965 hours.    Recent Labs   Lab Test 08/07/17  0830   AST 25.0   ALT 29.0       TSH (mU/L)   Date Value   02/18/2022 1.53   06/19/2013 2.68   03/14/2008 2.26       Ref Range & Units 7/13/23   TSH  >1: 0.465  uIU/mL 1.58     No results found for: \"UAMP\", \"UBARB\", \"BENZODIAZEUR\", \"UCANN\", \"UCOC\", \"OPIT\", \"UPCP\"    No results found for: \"IRONSAT\", \"YH11634\", \"KOFFI\"    No results found for: \"PH\", \"PHARTERIAL\", \"PO2\", \"YL8JBIFNDPR\", \"SAT\", \"PCO2\", \"HCO3\", \"BASEEXCESS\", \"GRETCHEN\", \"BEB\"      Echocardiology: No results found for this or any previous visit (from the past " 4320 hour(s)).    Chest x-ray: No results found for this or any previous visit from the past 365 days.      Chest CT: No results found for this or any previous visit from the past 365 days.      PFT: Most Recent B[place]ze Pulmonary Function Testing: No results found        Albaro Linn MD 11/1/2023

## 2023-11-08 ASSESSMENT — SLEEP AND FATIGUE QUESTIONNAIRES
HOW LIKELY ARE YOU TO NOD OFF OR FALL ASLEEP WHILE SITTING AND TALKING TO SOMEONE: WOULD NEVER DOZE
HOW LIKELY ARE YOU TO NOD OFF OR FALL ASLEEP WHILE WATCHING TV: SLIGHT CHANCE OF DOZING
HOW LIKELY ARE YOU TO NOD OFF OR FALL ASLEEP WHILE SITTING AND READING: SLIGHT CHANCE OF DOZING
HOW LIKELY ARE YOU TO NOD OFF OR FALL ASLEEP IN A CAR, WHILE STOPPED FOR A FEW MINUTES IN TRAFFIC: WOULD NEVER DOZE
HOW LIKELY ARE YOU TO NOD OFF OR FALL ASLEEP WHEN YOU ARE A PASSENGER IN A CAR FOR AN HOUR WITHOUT A BREAK: SLIGHT CHANCE OF DOZING
HOW LIKELY ARE YOU TO NOD OFF OR FALL ASLEEP WHILE SITTING QUIETLY AFTER LUNCH WITHOUT ALCOHOL: WOULD NEVER DOZE
HOW LIKELY ARE YOU TO NOD OFF OR FALL ASLEEP WHILE SITTING INACTIVE IN A PUBLIC PLACE: WOULD NEVER DOZE
HOW LIKELY ARE YOU TO NOD OFF OR FALL ASLEEP WHILE LYING DOWN TO REST IN THE AFTERNOON WHEN CIRCUMSTANCES PERMIT: MODERATE CHANCE OF DOZING

## 2023-11-09 ENCOUNTER — OFFICE VISIT (OUTPATIENT)
Dept: SLEEP MEDICINE | Facility: CLINIC | Age: 43
End: 2023-11-09
Attending: INTERNAL MEDICINE
Payer: COMMERCIAL

## 2023-11-09 ENCOUNTER — DOCUMENTATION ONLY (OUTPATIENT)
Dept: SLEEP MEDICINE | Facility: CLINIC | Age: 43
End: 2023-11-09

## 2023-11-09 DIAGNOSIS — G47.9 SLEEP DISTURBANCE: Primary | ICD-10-CM

## 2023-11-09 DIAGNOSIS — G47.9 SLEEP DISTURBANCE: ICD-10-CM

## 2023-11-09 PROCEDURE — G0399 HOME SLEEP TEST/TYPE 3 PORTA: HCPCS | Performed by: INTERNAL MEDICINE

## 2023-11-10 ENCOUNTER — DOCUMENTATION ONLY (OUTPATIENT)
Dept: SLEEP MEDICINE | Facility: CLINIC | Age: 43
End: 2023-11-10
Attending: INTERNAL MEDICINE
Payer: COMMERCIAL

## 2023-11-10 NOTE — PROGRESS NOTES
HST POST-STUDY QUESTIONNAIRE    What time did you go to bed?  10:20  How long do you think it took to fall asleep?  15 min  What time did you wake up to start the day?  8:30  Did you get up during the night at all?  no  If you woke up, do you remember approximately what time(s)? no  Did you have any difficulty with the equipment?  No  Did you us any type of treatment with this study?  None  Was the head of the bed elevated? No  Did you sleep in a recliner?  No  Did you stop using CPAP at least 3 days before this test?  NA  Any other information you'd like us to know? no

## 2023-11-13 ENCOUNTER — LAB (OUTPATIENT)
Dept: LAB | Facility: CLINIC | Age: 43
End: 2023-11-13
Payer: COMMERCIAL

## 2023-11-13 DIAGNOSIS — E83.19 OTHER DISORDERS OF IRON METABOLISM: ICD-10-CM

## 2023-11-13 LAB
FERRITIN SERPL-MCNC: 103 NG/ML (ref 31–409)
IRON BINDING CAPACITY (ROCHE): 257 UG/DL (ref 240–430)
IRON SATN MFR SERPL: 23 % (ref 15–46)
IRON SERPL-MCNC: 60 UG/DL (ref 61–157)

## 2023-11-13 PROCEDURE — 83540 ASSAY OF IRON: CPT

## 2023-11-13 PROCEDURE — 82728 ASSAY OF FERRITIN: CPT

## 2023-11-13 PROCEDURE — 36415 COLL VENOUS BLD VENIPUNCTURE: CPT

## 2023-11-13 PROCEDURE — 83550 IRON BINDING TEST: CPT

## 2023-11-14 NOTE — PROGRESS NOTES
This HSAT was performed using a Noxturnal T3 device which recorded snore, sound, movement activity, body position, nasal pressure, oronasal thermal airflow, pulse, oximetry and both chest and abdominal respiratory effort. HSAT data was restricted to the time patient states they were in bed.     HSAT was scored using 1B 4% hypopnea rule.     HST AHI (Non-PAT): 0.9  Snoring was reported as mild, moderate, and intermittent.  Time with SpO2 below 89% was 0.4 minutes.   Overall signal quality was good     Pt will follow up with sleep provider to determine appropriate therapy.

## 2023-11-15 NOTE — PROCEDURES
"HOME SLEEP STUDY INTERPRETATION        Patient: Asa Mann  MRN: [id number]  YOB: 1980  Study Date: 2023  PCP/Referring Provider: Rita Rios MD  Ordering Provider: Albaro Linn MD     Indications for Home Study: Asa Mann is a 43 year old male  who presents with symptoms suggestive of obstructive sleep apnea.    Estimated body mass index is 25.68 kg/m  as calculated from the following:    Height as of 23: 1.88 m (6' 2\").    Weight as of 23: 90.7 kg (200 lb).  Total score - [place]: 6  STOP-BAN/8      Data: A full night home sleep study was performed recording the standard physiologic parameters including body position, movement, sound, nasal pressure, thermal oral airflow, chest and abdominal movements with respiratory inductance plethysmography, and oxygen saturation by pulse oximetry. Pulse rate was estimated by oximetry recording. This study was considered adequate based on > 4 hours of quality oximetry and respiratory recording. As specified by the AASM Manual for the Scoring of Sleep and Associated events, version 2.3, Rule VIII.D 1B, 4% oxygen desaturation scoring for hypopneas is used as a standard of care on all home sleep apnea testing.      Analysis Time: 587 minutes      Respiration:   Sleep Associated Hypoxemia: sustained hypoxemia was not present.  Average oxygen saturation was 92.5%.  Time with saturation less than or equal to 88% was 0 minutes. The lowest oxygen saturation was 88%.   Snoring: Snoring was present.  Respiratory events: The home study revealed a presence of 0 obstructive apneas and 7 mixed and central apneas. There were 2 hypopneas resulting in a combined apnea/hypopnea index [AHI] of 0.9 events per hour.  AHI was N/A  supine, N/A  prone, 4.3 per hour on left side, and 0.6 per hour on right side.   Pattern: Excluding events noted above, respiratory rate and pattern was Normal.    Position: Percent of " time spent: supine -0%, prone -0%, on left -9.5%, on right -90.5%.    Heart Rate: By pulse oximetry, the average pulse rate was normal at 73 bpm.  The minimum pulse rate was 61 bpm and the maximum pulse rate was 108 bpm.    Assessment:   There is no evidence of clinically significant sleep-related breathing disorder.    Recommendations:  Consider obtaining in-lab sleep study(polysomnography) to evaluate for sleep-related breathing disorder. Home sleep apnea testing may lack sensitivity to identify mild disease.   Suggest optimizing sleep hygiene and avoiding sleep deprivation.  Weight management.      Diagnosis Code(s): Snoring R06.83, Sleep disorder unspecified G47.9    Electronically signed by: Albaro Linn MD, November 15, 2023   Diplomate, American Board of Internal Medicine, Sleep Medicine

## 2023-12-04 ENCOUNTER — MYC MEDICAL ADVICE (OUTPATIENT)
Dept: FAMILY MEDICINE | Facility: CLINIC | Age: 43
End: 2023-12-04

## 2023-12-04 DIAGNOSIS — F41.1 GENERALIZED ANXIETY DISORDER: ICD-10-CM

## 2023-12-04 DIAGNOSIS — R39.12 BENIGN PROSTATIC HYPERPLASIA WITH WEAK URINARY STREAM: ICD-10-CM

## 2023-12-04 DIAGNOSIS — N40.1 BENIGN PROSTATIC HYPERPLASIA WITH WEAK URINARY STREAM: ICD-10-CM

## 2023-12-04 RX ORDER — FLUOXETINE 10 MG/1
10 CAPSULE ORAL DAILY
Qty: 90 CAPSULE | Refills: 1 | Status: SHIPPED | OUTPATIENT
Start: 2023-12-04 | End: 2024-05-28

## 2023-12-04 RX ORDER — ALFUZOSIN HYDROCHLORIDE 10 MG/1
10 TABLET, EXTENDED RELEASE ORAL DAILY
Qty: 90 TABLET | Refills: 1 | Status: SHIPPED | OUTPATIENT
Start: 2023-12-04 | End: 2024-05-28

## 2023-12-04 NOTE — TELEPHONE ENCOUNTER
Pt requesting transfer of remaining refills of Fluoxetine and Alfuzosin to [facility] at [address] [place] [address].    MAITE SrivastavaN, RN  12/04/23, 3:57 PM

## 2023-12-13 NOTE — PROGRESS NOTES
"Asa Mann is a 43 year old male here for the following issues:    GI concerns  Traveled to [place] 7/2021. Developed diarrhea and vomiting after the trip  Stool cultures negative  1/2022 visit to address ongoing diarrhea, loose stools, urgency and lactose intolerance  GI team felt this was post infectious irritable bowel and recommended probiotic, fiber supplement, FODMAP diet.   Now having 2 stools per day. Reports episodic urgency, near incontinence, 3 episodes in the past 6 months. Taking daily fiber with some improvement, pattern has not returned to normal.   Still avoiding dairy, uses Lactase tablet prn  Tends toward constipation but can become mushy and not able to completely evacuate. Stool size is smaller  Hears a loud grumble/gurgle in upper abdomen, then 45 min later, urgency  with mushy stool \"for an hour\"  incomplete emptying, alternate mush and dry stool, some straining  Painless rectal bleeding at end of BM, seen on toilet paper  Did pelvic floor therapy for prostate issues in the past and open to seeing them again      Generalized anxiety disorder  Fluoxetine 10mg daily  Feels medication working well  Expecting a baby in May 2024, excited about that.         7/21/2023    10:08 AM 8/17/2023     4:22 PM 12/15/2023    11:29 AM   PHQ   PHQ-9 Total Score 1 3 3   Q9: Thoughts of better off dead/self-harm past 2 weeks Not at all Not at all Not at all         7/21/2023    10:08 AM 8/17/2023     4:22 PM 12/15/2023    11:29 AM   NAVYA-7 SCORE   Total Score 2 2 1       Sleep study   RLS diagnosed and they checked iron and ferritin levels which were normal  Diet rich in meat, vegetable whole grains  Home study negative for sleep apnea, they recommended formal sleep study  Iron 60, Ferritin 103, Hgb 16.1    Patient Active Problem List   Diagnosis    Benign neoplasm of skin    History of thyroid disease    Ocular migraine - Both Eyes    Major Depression    Generalized anxiety disorder    Benign prostatic " "hyperplasia with weak urinary stream    Skin cancer screening    Verruca vulgaris    Seborrheic keratosis    Multiple melanocytic nevi    Hyperlipidemia       Current Outpatient Medications   Medication Sig Dispense Refill    alfuzosin ER (UROXATRAL) 10 MG 24 hr tablet Take 1 tablet (10 mg) by mouth daily 90 tablet 1    FLUoxetine (PROZAC) 10 MG capsule Take 1 capsule (10 mg) by mouth daily 90 capsule 1    ibuprofen (ADVIL/MOTRIN) 200 MG tablet Take 200 mg by mouth every 4 hours as needed for mild pain      Probiotic Product (PROBIOTIC-10 PO)       psyllium (METAMUCIL/KONSYL) capsule          Allergies   Allergen Reactions    Amoxicillin Other (See Comments)     Tops of legs turned red, hot and itchy    Zithromax [Azithromycin Dihydrate] Diarrhea    Penicillins Rash        EXAM  /72 (BP Location: Left arm, Patient Position: Sitting, Cuff Size: Adult Large)   Pulse 92   Temp 97  F (36.1  C) (Skin)   Resp 15   Ht 1.88 m (6' 2\")   Wt 90.7 kg (200 lb)   SpO2 97%   BMI 25.68 kg/m    Gen: Alert, pleasant, NAD  COR: S1,S2, no murmur  Lungs: CTA bilaterally, no rhonchi, wheezes or rales  Abdomen: Soft, non tender, normal bowel sounds, no HSM or mass      Assessment:  (K58.2) Irritable bowel syndrome with both constipation and diarrhea  (primary encounter diagnosis)  Comment: persistent bowel issues since having traveler's diarrhea in July 2021. Tends toward constipation but has episodic mushy stool , near incontinence and lactose intolerance. Also notes incomplete emptying with bowel movements, episodic rectal spasm, now resolved  Plan: PHYSICAL THERAPY REFERRAL.   recommend daily fiber powder. High fiber diet. Referred to PT clinic to address pelvic floor.     (F41.1) Generalized anxiety disorder  Comment: doing very well with fluoxetine  Plan: RTC in 6 months, refills given through that time.     (R06.83) Snoring  Comment: followed by sleep clinic, home study did not find sleep apnea  Plan: follow up for in " person sleep study. Iron stores are normal. Sleep study will also formally diagnose RLS if  present.     36 minutes spend on the date of this encounter doing chart review, history and exam, documentation and further activities as noted above.       Rita Rios MD  Internal Medicine/Pediatrics

## 2023-12-15 ENCOUNTER — OFFICE VISIT (OUTPATIENT)
Dept: FAMILY MEDICINE | Facility: CLINIC | Age: 43
End: 2023-12-15
Payer: COMMERCIAL

## 2023-12-15 VITALS
HEART RATE: 92 BPM | HEIGHT: 74 IN | SYSTOLIC BLOOD PRESSURE: 119 MMHG | OXYGEN SATURATION: 97 % | BODY MASS INDEX: 25.67 KG/M2 | RESPIRATION RATE: 15 BRPM | TEMPERATURE: 97 F | WEIGHT: 200 LBS | DIASTOLIC BLOOD PRESSURE: 72 MMHG

## 2023-12-15 DIAGNOSIS — F41.1 GENERALIZED ANXIETY DISORDER: ICD-10-CM

## 2023-12-15 DIAGNOSIS — R06.83 SNORING: ICD-10-CM

## 2023-12-15 DIAGNOSIS — K58.2 IRRITABLE BOWEL SYNDROME WITH BOTH CONSTIPATION AND DIARRHEA: Primary | ICD-10-CM

## 2023-12-15 ASSESSMENT — ANXIETY QUESTIONNAIRES
1. FEELING NERVOUS, ANXIOUS, OR ON EDGE: NOT AT ALL
3. WORRYING TOO MUCH ABOUT DIFFERENT THINGS: NOT AT ALL
GAD7 TOTAL SCORE: 1
6. BECOMING EASILY ANNOYED OR IRRITABLE: NOT AT ALL
GAD7 TOTAL SCORE: 1
2. NOT BEING ABLE TO STOP OR CONTROL WORRYING: NOT AT ALL
7. FEELING AFRAID AS IF SOMETHING AWFUL MIGHT HAPPEN: NOT AT ALL
IF YOU CHECKED OFF ANY PROBLEMS ON THIS QUESTIONNAIRE, HOW DIFFICULT HAVE THESE PROBLEMS MADE IT FOR YOU TO DO YOUR WORK, TAKE CARE OF THINGS AT HOME, OR GET ALONG WITH OTHER PEOPLE: NOT DIFFICULT AT ALL
5. BEING SO RESTLESS THAT IT IS HARD TO SIT STILL: NOT AT ALL

## 2023-12-15 ASSESSMENT — PATIENT HEALTH QUESTIONNAIRE - PHQ9
5. POOR APPETITE OR OVEREATING: SEVERAL DAYS
SUM OF ALL RESPONSES TO PHQ QUESTIONS 1-9: 3

## 2023-12-15 NOTE — NURSING NOTE
"43 year old  Chief Complaint   Patient presents with    RECHECK     Follow up on GI issues. Pt reports in the last six months some episodes of GI issues.        Blood pressure 119/72, pulse 92, temperature 97  F (36.1  C), temperature source Skin, resp. rate 15, height 1.88 m (6' 2\"), weight 90.7 kg (200 lb), SpO2 97%. Body mass index is 25.68 kg/m .  Patient Active Problem List   Diagnosis    Benign neoplasm of skin    History of thyroid disease    Ocular migraine - Both Eyes    Major Depression    Generalized anxiety disorder    Benign prostatic hyperplasia with weak urinary stream    Skin cancer screening    Verruca vulgaris    Seborrheic keratosis    Multiple melanocytic nevi    Hyperlipidemia       Wt Readings from Last 2 Encounters:   12/15/23 90.7 kg (200 lb)   11/01/23 90.7 kg (200 lb)     BP Readings from Last 3 Encounters:   12/15/23 119/72   09/18/23 117/75   09/08/23 128/78         Current Outpatient Medications   Medication    alfuzosin ER (UROXATRAL) 10 MG 24 hr tablet    FLUoxetine (PROZAC) 10 MG capsule    ibuprofen (ADVIL/MOTRIN) 200 MG tablet    Probiotic Product (PROBIOTIC-10 PO)    psyllium (METAMUCIL/KONSYL) capsule     No current facility-administered medications for this visit.       Social History     Tobacco Use    Smoking status: Never    Smokeless tobacco: Never   Vaping Use    Vaping Use: Never used   Substance Use Topics    Alcohol use: Yes     Comment: 4-5 days per week one beer or glass of wine    Drug use: No       Health Maintenance Due   Topic Date Due    ADVANCE CARE PLANNING  Never done    Pneumococcal Vaccine: Pediatrics (0 to 5 Years) and At-Risk Patients (6 to 64 Years) (1 - PCV) Never done       No results found for: \"PAP\"      December 15, 2023 11:27 AM    "

## 2023-12-16 PROBLEM — K58.2 IRRITABLE BOWEL SYNDROME WITH BOTH CONSTIPATION AND DIARRHEA: Status: ACTIVE | Noted: 2023-12-16

## 2024-01-03 ENCOUNTER — NURSE TRIAGE (OUTPATIENT)
Dept: FAMILY MEDICINE | Facility: CLINIC | Age: 44
End: 2024-01-03

## 2024-01-03 NOTE — TELEPHONE ENCOUNTER
"FD transferred call to triage since it involved abdominal pain.  Last Thursday, pt returned from [place] for the holidays.  He has been experiencing mild pain in his \"upper right side just below my rib cage.\"  Rates pain 3/10, on and off, described as \"noticeable discomfort.\"  Scheduled 1/9/24 with Dr. Rios.    Reason for Disposition   Pain is mainly in upper abdomen  (if needed ask: \"is it mainly above the belly button?\")    Additional Information   Negative: Shock suspected (e.g., cold/pale/clammy skin, too weak to stand, low BP, rapid pulse)   Negative: Difficult to awaken or acting confused (e.g., disoriented, slurred speech)   Negative: Passed out (i.e., lost consciousness, collapsed and was not responding)   Negative: Sounds like a life-threatening emergency to the triager   Negative: Abdomen bloating or swelling are main symptoms   Negative: Followed an abdomen (stomach) injury   Negative: Chest pain   Negative: [1] Abdominal pain AND [2] pregnant < 20 weeks   Negative: [1] Abdominal pain AND [2] pregnant 20 or more weeks   Negative: [1] Abdominal pain AND [2] postpartum (from 0 to 6 weeks after delivery)   Negative: [1] Vomiting AND [2] contains bile (green color)   Negative: [1] Pregnant 20 or more weeks AND [2] new hand or face swelling   Negative: Patient sounds very sick or weak to the triager   Negative: [1] SEVERE pain (e.g., excruciating) AND [2] present > 1 hour   Negative: [1] Pain lasts > 10 minutes AND [2] age > 50   Negative: [1] Pain lasts > 10 minutes AND [2] age > 40 AND [3] associated chest, arm, neck, upper back or jaw pain   Negative: [1] Pain lasts > 10 minutes AND [2] age > 35 AND [3] at least one cardiac risk factor (e.g., diabetes, high cholesterol, hypertension, obesity, smoker or strong family history of heart disease)   Negative: [1] Pain lasts > 10 minutes AND [2] history of heart disease (i.e., heart attack, bypass surgery, angina, angioplasty, CHF; not just a heart murmur)   " "Negative: [1] Pain lasts > 10 minutes AND [2] difficulty breathing   Negative: [1] Vomiting AND [2] contains red blood  (Exception: Few streaks and only occurred once.)   Negative: [1] Vomiting AND [2] contains black (\"coffee ground\") material   Negative: Blood in bowel movements  (Exception: Blood on surface of BM with constipation.)   Negative: Black or tarry bowel movements  (Exception: Chronic-unchanged black-grey BMs AND is taking iron pills or Pepto-Bismol.)   Negative: SEVERE difficulty breathing (e.g., struggling for each breath, speaks in single words)   Negative: Shock suspected (e.g., cold/pale/clammy skin, too weak to stand, low BP, rapid pulse)   Negative: Difficult to awaken or acting confused (e.g., disoriented, slurred speech)   Negative: Passed out (i.e., lost consciousness, collapsed and was not responding)   Negative: Visible sweat on face or sweat dripping down face   Negative: Sounds like a life-threatening emergency to the triager   Negative: [1] MILD-MODERATE pain AND [2] constant AND [3] present > 2 hours   Negative: [1] MILD-MODERATE pain AND [2] not relieved by antacid medicine   Negative: [1] Vomiting AND [2] abdomen looks much more swollen than usual   Negative: White of the eyes have turned yellow (i.e., jaundice)   Negative: Fever > 103 F (39.4 C)   Negative: [1] Fever > 101 F (38.3 C) AND [2] age > 60 years   Negative: [1] Fever > 100.0 F (37.8 C) AND [2] bedridden (e.g., CVA, chronic illness, recovering from surgery)   Negative: [1] Fever > 100.0 F (37.8 C) AND [2] diabetes mellitus or weak immune system (e.g., HIV positive, cancer chemo, splenectomy, organ transplant, chronic steroids)    Answer Assessment - Initial Assessment Questions  1. LOCATION: \"Where does it hurt?\"      RUQ  2. RADIATION: \"Does the pain shoot anywhere else?\" (e.g., chest, back)      No  3. ONSET: \"When did the pain begin?\" (Minutes, hours or days ago)       12/28/2023  4. SUDDEN: \"Gradual or sudden onset?\"     " " Sudden, woke up with it that morning  5. PATTERN \"Does the pain come and go, or is it constant?\"     - If it comes and goes: \"How long does it last?\" \"Do you have pain now?\"      (Note: Comes and goes means the pain is intermittent. It goes away completely between bouts.)     - If constant: \"Is it getting better, staying the same, or getting worse?\"       (Note: Constant means the pain never goes away completely; most serious pain is constant and gets worse.)       Comes and goes, sometimes more of a sensation  6. SEVERITY: \"How bad is the pain?\"  (e.g., Scale 1-10; mild, moderate, or severe)     - MILD (1-3): Doesn't interfere with normal activities, abdomen soft and not tender to touch.      - MODERATE (4-7): Interferes with normal activities or awakens from sleep, abdomen tender to touch.      - SEVERE (8-10): Excruciating pain, doubled over, unable to do any normal activities.        3  7. RECURRENT SYMPTOM: \"Have you ever had this type of stomach pain before?\" If Yes, ask: \"When was the last time?\" and \"What happened that time?\"       no  8. CAUSE: \"What do you think is causing the stomach pain?\"      no  9. RELIEVING/AGGRAVATING FACTORS: \"What makes it better or worse?\" (e.g., antacids, bending or twisting motion, bowel movement)      no  10. OTHER SYMPTOMS: \"Do you have any other symptoms?\" (e.g., back pain, diarrhea, fever, urination pain, vomiting)        none    Protocols used: Abdominal Pain - Male-A-, Abdominal Pain - Upper-A-    MADELAINE Srivastava, RN  01/03/24, 3:34 PM    "

## 2024-01-04 NOTE — NURSING NOTE
HST pick-up, HST drop-off, and follow-up appointment with provider have all been scheduled. Lab tests completed by patient as well.  Snow Lopez, CMA

## 2024-01-09 ENCOUNTER — OFFICE VISIT (OUTPATIENT)
Dept: FAMILY MEDICINE | Facility: CLINIC | Age: 44
End: 2024-01-09
Payer: COMMERCIAL

## 2024-01-09 VITALS
HEIGHT: 74 IN | HEART RATE: 86 BPM | OXYGEN SATURATION: 97 % | DIASTOLIC BLOOD PRESSURE: 75 MMHG | TEMPERATURE: 97.1 F | WEIGHT: 206 LBS | SYSTOLIC BLOOD PRESSURE: 110 MMHG | RESPIRATION RATE: 15 BRPM | BODY MASS INDEX: 26.44 KG/M2

## 2024-01-09 DIAGNOSIS — D73.4 SPLENIC CYST: ICD-10-CM

## 2024-01-09 DIAGNOSIS — K76.89 HEPATIC CYST: ICD-10-CM

## 2024-01-09 DIAGNOSIS — R10.11 RUQ PAIN: Primary | ICD-10-CM

## 2024-01-09 ASSESSMENT — ANXIETY QUESTIONNAIRES
7. FEELING AFRAID AS IF SOMETHING AWFUL MIGHT HAPPEN: NOT AT ALL
6. BECOMING EASILY ANNOYED OR IRRITABLE: NOT AT ALL
3. WORRYING TOO MUCH ABOUT DIFFERENT THINGS: NOT AT ALL
IF YOU CHECKED OFF ANY PROBLEMS ON THIS QUESTIONNAIRE, HOW DIFFICULT HAVE THESE PROBLEMS MADE IT FOR YOU TO DO YOUR WORK, TAKE CARE OF THINGS AT HOME, OR GET ALONG WITH OTHER PEOPLE: NOT DIFFICULT AT ALL
5. BEING SO RESTLESS THAT IT IS HARD TO SIT STILL: NOT AT ALL
GAD7 TOTAL SCORE: 1
2. NOT BEING ABLE TO STOP OR CONTROL WORRYING: NOT AT ALL
GAD7 TOTAL SCORE: 1
1. FEELING NERVOUS, ANXIOUS, OR ON EDGE: NOT AT ALL

## 2024-01-09 ASSESSMENT — PATIENT HEALTH QUESTIONNAIRE - PHQ9
5. POOR APPETITE OR OVEREATING: SEVERAL DAYS
SUM OF ALL RESPONSES TO PHQ QUESTIONS 1-9: 3

## 2024-01-09 NOTE — NURSING NOTE
"43 year old  Chief Complaint   Patient presents with    Abdominal Pain     Pt reports abdominal pain since michael and has been ongoing for the past two weeks. The pain has decreased over that period of time.       Blood pressure 110/75, pulse 86, temperature 97.1  F (36.2  C), temperature source Skin, resp. rate 15, height 1.88 m (6' 2\"), weight 93.4 kg (206 lb), SpO2 97%. Body mass index is 26.45 kg/m .  Patient Active Problem List   Diagnosis    Benign neoplasm of skin    History of thyroid disease    Ocular migraine - Both Eyes    Major Depression    Generalized anxiety disorder    Benign prostatic hyperplasia with weak urinary stream    Skin cancer screening    Verruca vulgaris    Seborrheic keratosis    Multiple melanocytic nevi    Hyperlipidemia    Irritable bowel syndrome with both constipation and diarrhea       Wt Readings from Last 2 Encounters:   01/09/24 93.4 kg (206 lb)   12/15/23 90.7 kg (200 lb)     BP Readings from Last 3 Encounters:   01/09/24 110/75   12/15/23 119/72   09/18/23 117/75         Current Outpatient Medications   Medication    alfuzosin ER (UROXATRAL) 10 MG 24 hr tablet    FLUoxetine (PROZAC) 10 MG capsule    ibuprofen (ADVIL/MOTRIN) 200 MG tablet    Probiotic Product (PROBIOTIC-10 PO)    psyllium (METAMUCIL/KONSYL) capsule     No current facility-administered medications for this visit.       Social History     Tobacco Use    Smoking status: Never    Smokeless tobacco: Never   Vaping Use    Vaping Use: Never used   Substance Use Topics    Alcohol use: Yes     Comment: 4-5 days per week one beer or glass of wine    Drug use: No       Health Maintenance Due   Topic Date Due    ADVANCE CARE PLANNING  Never done    Pneumococcal Vaccine: Pediatrics (0 to 5 Years) and At-Risk Patients (6 to 64 Years) (1 of 2 - PCV) Never done       No results found for: \"PAP\"      January 9, 2024 2:09 PM    "

## 2024-01-09 NOTE — PROGRESS NOTES
"Asa Mann is a 43 year old male with hx of irritable bowel, ocular migraines, BPH, anxiety, depression, hyperlipidemia. He is here for the following issues:    RUQ pain  Mild RUQ pain 3/10, episodic, began 12/28/23  Previous evening, grill chicken, cake, buttercream frosting  Had not taken psyllium power x 5 days prior to episode, no horrible constipation  Awoke from sleep with pain, under right rib cage, 3-4/10  Continued waxed and waned for about a week  Slowly improved, hasn't noted it today  During that time, no changes in bowel function, no change in stool color        Current Outpatient Medications   Medication Sig Dispense Refill    alfuzosin ER (UROXATRAL) 10 MG 24 hr tablet Take 1 tablet (10 mg) by mouth daily 90 tablet 1    FLUoxetine (PROZAC) 10 MG capsule Take 1 capsule (10 mg) by mouth daily 90 capsule 1    ibuprofen (ADVIL/MOTRIN) 200 MG tablet Take 200 mg by mouth every 4 hours as needed for mild pain      Probiotic Product (PROBIOTIC-10 PO)       psyllium (METAMUCIL/KONSYL) capsule          Allergies   Allergen Reactions    Amoxicillin Other (See Comments)     Tops of legs turned red, hot and itchy    Zithromax [Azithromycin Dihydrate] Diarrhea    Penicillins Rash        EXAM  /75 (BP Location: Left arm, Patient Position: Sitting, Cuff Size: Adult Large)   Pulse 86   Temp 97.1  F (36.2  C) (Skin)   Resp 15   Ht 1.88 m (6' 2\")   Wt 93.4 kg (206 lb)   SpO2 97%   BMI 26.45 kg/m    Gen: Alert, pleasant, NAD  COR: S1,S2, no murmur  Lungs: CTA bilaterally, no rhonchi, wheezes or rales  Abdomen: Soft, non tender, normal bowel sounds, no HSM or mass      Assessment:  (R10.11) RUQ pain  (primary encounter diagnosis)  Comment: DDX includes constipation, GB disease, MS strain  Plan: US abdomen complete        Obtain US abdomen, discussed GB disease, monitor for acute change in symptoms.   Addendum: US negative for GB disease, bile duct normal caliber    (K76.89) Hepatic cyst  (D73.4) " Splenic cyst  Comment: incidental finding of anechoic cyst in liver (nonvascular) and hypoechoic cyst in spleen, may require surveillance or additional imagine with CT scan  Plan: Adult GI  Referral - Consult Only        Referral is entered for liver clinic evaluation and patient is notified in imaging report comments.       Rita Rios MD  Internal Medicine/Pediatrics

## 2024-01-13 ENCOUNTER — ANCILLARY PROCEDURE (OUTPATIENT)
Dept: ULTRASOUND IMAGING | Facility: CLINIC | Age: 44
End: 2024-01-13
Attending: INTERNAL MEDICINE
Payer: COMMERCIAL

## 2024-01-13 DIAGNOSIS — R10.11 RUQ PAIN: ICD-10-CM

## 2024-01-13 PROCEDURE — 76700 US EXAM ABDOM COMPLETE: CPT | Mod: GC | Performed by: STUDENT IN AN ORGANIZED HEALTH CARE EDUCATION/TRAINING PROGRAM

## 2024-01-26 NOTE — CONFIDENTIAL NOTE
DIAGNOSIS:    Hepatic cyst   Splenic cyst      Appt Date:  04.09.2024   NOTES STATUS DETAILS   OFFICE NOTE from referring provider Internal 01.09.2024 Rita Rios MD    OFFICE NOTES from other specialists     DISCHARGE SUMMARY from hospital     MEDICATION LIST Internal    LIVER BIOSPY (IF APPLICABLE)      PATHOLOGY REPORTS      IMAGING     ENDOSCOPY (IF AVAILABLE)     COLONOSCOPY (IF AVAILABLE)     ULTRASOUND LIVER Internal 01.13.2024 US Abd Complete   CT OF ABDOMEN     MRI OF LIVER     FIBROSCAN, US ELASTOGRAPHY, FIBROSIS SCAN, MR ELASTOGRAPHY     LABS     HEPATIC PANEL (LIVER PANEL)     BASIC METABOLIC PANEL Care Everywhere 07.13.2023   COMPLETE METABOLIC PANEL Care Everywhere 08.08.2023   COMPLETE BLOOD COUNT (CBC) Care Everywhere 08.08.2023   INTERNATIONAL NORMALIZED RATIO (INR)     HEPATITIS C ANTIBODY Internal 03.01.2023   HEPATITIS C VIRAL LOAD/PCR     HEPATITIS C GENOTYPE     HEPATITIS B SURFACE ANTIGEN     HEPATITIS B SURFACE ANTIBODY     HEPATITIS B DNA QUANT LEVEL     HEPATITIS B CORE ANTIBODY

## 2024-02-06 ENCOUNTER — THERAPY VISIT (OUTPATIENT)
Dept: PHYSICAL THERAPY | Facility: CLINIC | Age: 44
End: 2024-02-06
Attending: INTERNAL MEDICINE
Payer: COMMERCIAL

## 2024-02-06 DIAGNOSIS — R39.11 URINARY HESITANCY: ICD-10-CM

## 2024-02-06 DIAGNOSIS — K58.2 IRRITABLE BOWEL SYNDROME WITH BOTH CONSTIPATION AND DIARRHEA: ICD-10-CM

## 2024-02-06 DIAGNOSIS — M62.89 PELVIC FLOOR DYSFUNCTION: Primary | ICD-10-CM

## 2024-02-06 PROCEDURE — 97535 SELF CARE MNGMENT TRAINING: CPT | Mod: GP | Performed by: PHYSICAL THERAPIST

## 2024-02-06 PROCEDURE — 97161 PT EVAL LOW COMPLEX 20 MIN: CPT | Mod: GP | Performed by: PHYSICAL THERAPIST

## 2024-02-06 PROCEDURE — 97530 THERAPEUTIC ACTIVITIES: CPT | Mod: GP | Performed by: PHYSICAL THERAPIST

## 2024-02-06 PROCEDURE — 97110 THERAPEUTIC EXERCISES: CPT | Mod: GP | Performed by: PHYSICAL THERAPIST

## 2024-02-06 NOTE — PROGRESS NOTES
PHYSICAL THERAPY EVALUATION  Type of Visit: Evaluation    See electronic medical record for Abuse and Falls Screening details.    Subjective   Patient reports to physical therapy for pelvic floor for ongoing issues with urinary issues and new bowel issues.  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  He has stretches where he is okay, but bowel movements go back and forth between mushy and constipated, has the feeling of incomplete emptying.  He sits longer to see if more comes out for about 20-30 minutes and unsure if he makes things worse, has a BM with coffee in the morning and with afternoon caffeine.  Notices some food triggers, hasn't done a full elimination diet, was just on vacation and didn't have coffee in afternoon, felt more regular.  Has had a few instances of rectal cramping or spasm after having a BM, can be really uncomfortable and cause nausea.  History of urinary hesitancy and imcomplete emptying, hesitancy is better but feels like peeing is not as quick as he wants it to be.  Has history of cramping with ejaculation (about 15 years ago), was treated for bacterial prostitis and has not had that issue since.  Knows that he carries a lot of tension in his pelvic floor, can feel it relax sometimes.  Likes to walk every other day 10-15 minutes and does some body weight squats.   and sits in chair all day (has sit/stand desk).  Notes some trauma with bathroom habits of family pounding on door as a child, in therapy.  Wife is pregnant and having baby in May.        Presenting condition or subjective complaint: Not always feeling like I am completely emptying bowels, feeling like it takes a long time to fully empty bladder  Date of onset: 12/15/23    Relevant medical history: Bladder or bowel problems; Depression   Dates & types of surgery: appendectomy approx. 2008    Prior diagnostic imaging/testing results: Other I have had internal bladder/urethra imaging  several years ago   Prior therapy history for the same diagnosis, illness or injury: Yes some pelvic floor PT several years ago in [facility] network    Prior Level of Function  Transfers: Independent  Ambulation: Independent  ADL: Independent  IADL: Driving, Finances, Housekeeping, Laundry, Meal preparation, Medication management, Work    Living Environment  Social support: With a significant other or spouse   Type of home: House   Stairs to enter the home: Yes 3 Is there a railing: Yes   Ramp: No   Stairs inside the home: Yes 2 Is there a railing: Yes   Help at home: Home management tasks (cooking, cleaning); Medication and/or finances; Home and Yard maintenance tasks; Assist for driving and community activities  Equipment owned:       Employment: Yes   Hobbies/Interests: gardening, fishing, walking    Patient goals for therapy: finish peeing quicker    Pain assessment: See objective evaluation for additional pain details     Objective      PELVIC EVALUATION  ADDITIONAL HISTORY:  Sex assigned at birth: Male  Gender identity: Male    Pronouns: He/Him/His      Bladder History:  Feels bladder filling: Yes  Triggers for feeling of inability to wait to go to the bathroom: No    How long can you wait to urinate: a long time  Gets up at night to urinate: No    Can stop the flow of urine when urinating: Yes  Volume of urine usually released: Average   Other issues: Slow or hesitant urine stream; Trouble emptying bladder completely  Number of bladder infections in last 12 months:    Fluid intake per day: 32 oz 16 oz 12 oz  Medications taken for bladder: Yes alfuzosin   Activities causing urine leak:      Amount of urine typically leaked:    Pads used to help with leaking: No        Bowel History:  Frequency of bowel movement: 1-2 times per day  Consistency of stool: Soft-formed    Ignores the urge to defecate: Sometimes  Other bowel issues: Straining to have bowel movement  Length of time spent trying to have  a bowel movement: I try not to strain / don't have to on average, but sometimes I do have to    Sexual Function History:  Sexual orientation: Straight    Sexually active: Yes  Lubrication used: Yes Yes  Pelvic pain:      Pain or difficulty with orgasms/erection/ejaculation: No    State of menopause:    Hormone medications: No      Are you currently pregnant: No, Number of previous pregnancies: 0, Number of deliveries: 0, Have you been diagnosed with pelvic prolapse or abdominal separation: No, Do you get regular exercise: Yes, I do this type of exercise: low intensity cardio like walking, push ups, squats without weights, Have you tried pelvic floor strengthening exercises for 4 weeks: Yes, Do you have any history of trauma that is relevant to your care that you d like to share: Yes, I d like to discuss it with my provider in person.    Discussed reason for referral regarding pelvic health needs and external/internal pelvic floor muscle examination with patient/guardian.  Opportunity provided to ask questions and verbal consent for assessment and intervention was given.    PAIN: Pain Level at Rest: 3/10  Pain Level with Use: 9/10  Pain Location: rectum or pelvic floor   Pain Quality: Burning and cramping, tightness   Pain Frequency: intermittent  Pain is Worst: daytime  Pain is Exacerbated By: sitting, having BM   Pain is Relieved By: unsure   Pain Progression: Improved  POSTURE: Sitting Posture: Rounded shoulders, Forward head, Thoracic kyphosis increased  LUMBAR SCREEN:   (Degrees) Left AROM Left PROM  Right AROM Right PROM   Hip Flexion       Hip Extension       Hip Abduction       Hip Adduction       Hip Internal Rotation       Hip External Rotation       Knee Flexion       Knee Extension       Lumbar Side glide     Lumbar Flexion    Lumbar Extension    Pain:   End feel:   HIP SCREEN:  Right hip extension limited, pain in back with R hip extension in sidelying  Strength: WNL   Functional Strength Testing:      PELVIC/SI SCREEN:  WNL   PAIN PROVOCATION TEST: WNL  PELVIS/SI SPECIAL TESTS: WNL  BREATHING SYMMETRY:  Rib cage extended, decreased lower abdominal expansion    PELVIC EXAM  External Visual Inspection:  At rest: Elevated perineal body  With voluntary pelvic floor contraction: Perineal elevation  Relaxation of PFM: Partial/delayed relaxation  With intra-abdominal pressure: Cough: Perineal descent  Valsalva: Perineal descent  Bearing down as defecation: Perineal descent    Integumentary:   Anal: Hemorrhoids    External Digital Palpation per Perineum:   Ischiocavernosis: Tightness, Tenderness  Bulbo cavernosis: Tightness, Tenderness  Transverse perineal: Tightness, Tenderness, Pain  Levator ani: Tightness, Tenderness, Pain  Testicles: No pain   Spermatic cord: L side tightness    Scar:   Location/Type:   Mobility:     Internal Digital Palpation:  Per Vagina:      Per Rectum:        Pelvic Organ Prolapse:       ABDOMINAL ASSESSMENT  Diastasis Rectus Abdominis (ARIC):      Abdominal Activation/Strength:     Scar:   Location/Type:   Mobility:     Fascial Tension/Restriction:     BIOFEEDBACK:  Position:   Surface Electrodes:     Abdominals:     Perianals:       DERMATOMES:   DTR S:     Assessment & Plan   CLINICAL IMPRESSIONS  Medical Diagnosis: Irritable bowel syndrome with both constipation and diarrhea (K58.2)    Treatment Diagnosis: pelvic floor dysfunction, proctalgia fugax, constipation   Impression/Assessment: Patient is a 43 year old male with pelvic floor complaints.  The following significant findings have been identified: Pain, Decreased ROM/flexibility, Decreased joint mobility, Decreased strength, Impaired muscle performance, and Decreased activity tolerance. These impairments interfere with their ability to perform self care tasks as compared to previous level of function.     Clinical Decision Making (Complexity):  Clinical Presentation: Stable/Uncomplicated  Clinical Presentation Rationale: based on  medical and personal factors listed in PT evaluation  Clinical Decision Making (Complexity): Low complexity    PLAN OF CARE  Treatment Interventions:  Modalities: Biofeedback  Interventions: Manual Therapy, Neuromuscular Re-education, Therapeutic Activity, Therapeutic Exercise, Self-Care/Home Management    Long Term Goals     PT Goal 1  Goal Identifier: LTG 1  Goal Description: Patient will report bowel consistency 1x/day type 4 with no straining or incomplete emptying > 90% of the time  Rationale: to maximize safety and independence with performance of ADLs and functional tasks  Target Date: 04/30/24      Frequency of Treatment: every 1-2 weeks  Duration of Treatment: 12 weeks    Recommended Referrals to Other Professionals:  No further referral needed   Education Assessment:   Learner/Method: Patient;No Barriers to Learning    Risks and benefits of evaluation/treatment have been explained.   Patient/Family/caregiver agrees with Plan of Care.     Evaluation Time:     PT Eval, Low Complexity Minutes (05925): 20   Present: Not applicable     Signing Clinician: Chasity Wilson, PT

## 2024-02-08 PROBLEM — M62.89 PELVIC FLOOR DYSFUNCTION: Status: ACTIVE | Noted: 2024-02-08

## 2024-02-08 PROBLEM — R39.11 URINARY HESITANCY: Status: ACTIVE | Noted: 2024-02-08

## 2024-02-22 ENCOUNTER — THERAPY VISIT (OUTPATIENT)
Dept: PHYSICAL THERAPY | Facility: CLINIC | Age: 44
End: 2024-02-22
Attending: INTERNAL MEDICINE
Payer: COMMERCIAL

## 2024-02-22 DIAGNOSIS — K58.2 IRRITABLE BOWEL SYNDROME WITH BOTH CONSTIPATION AND DIARRHEA: Primary | ICD-10-CM

## 2024-02-22 DIAGNOSIS — R39.11 URINARY HESITANCY: ICD-10-CM

## 2024-02-22 DIAGNOSIS — M62.89 PELVIC FLOOR DYSFUNCTION: ICD-10-CM

## 2024-02-22 PROCEDURE — 97110 THERAPEUTIC EXERCISES: CPT | Mod: GP | Performed by: PHYSICAL THERAPIST

## 2024-02-22 PROCEDURE — 97530 THERAPEUTIC ACTIVITIES: CPT | Mod: GP | Performed by: PHYSICAL THERAPIST

## 2024-03-05 ENCOUNTER — OFFICE VISIT (OUTPATIENT)
Dept: FAMILY MEDICINE | Facility: CLINIC | Age: 44
End: 2024-03-05
Payer: COMMERCIAL

## 2024-03-05 VITALS
OXYGEN SATURATION: 100 % | DIASTOLIC BLOOD PRESSURE: 80 MMHG | HEIGHT: 74 IN | WEIGHT: 205 LBS | HEART RATE: 82 BPM | TEMPERATURE: 97.1 F | BODY MASS INDEX: 26.31 KG/M2 | RESPIRATION RATE: 15 BRPM | SYSTOLIC BLOOD PRESSURE: 116 MMHG

## 2024-03-05 DIAGNOSIS — K58.2 IRRITABLE BOWEL SYNDROME WITH BOTH CONSTIPATION AND DIARRHEA: ICD-10-CM

## 2024-03-05 DIAGNOSIS — K76.89 HEPATIC CYST: Primary | ICD-10-CM

## 2024-03-05 DIAGNOSIS — D73.4 SPLENIC CYST: ICD-10-CM

## 2024-03-05 DIAGNOSIS — K62.89 RECTAL IRRITATION: ICD-10-CM

## 2024-03-05 ASSESSMENT — ANXIETY QUESTIONNAIRES
IF YOU CHECKED OFF ANY PROBLEMS ON THIS QUESTIONNAIRE, HOW DIFFICULT HAVE THESE PROBLEMS MADE IT FOR YOU TO DO YOUR WORK, TAKE CARE OF THINGS AT HOME, OR GET ALONG WITH OTHER PEOPLE: SOMEWHAT DIFFICULT
2. NOT BEING ABLE TO STOP OR CONTROL WORRYING: NOT AT ALL
6. BECOMING EASILY ANNOYED OR IRRITABLE: SEVERAL DAYS
7. FEELING AFRAID AS IF SOMETHING AWFUL MIGHT HAPPEN: NOT AT ALL
5. BEING SO RESTLESS THAT IT IS HARD TO SIT STILL: SEVERAL DAYS
1. FEELING NERVOUS, ANXIOUS, OR ON EDGE: SEVERAL DAYS

## 2024-03-05 ASSESSMENT — PATIENT HEALTH QUESTIONNAIRE - PHQ9
5. POOR APPETITE OR OVEREATING: SEVERAL DAYS
SUM OF ALL RESPONSES TO PHQ QUESTIONS 1-9: 2

## 2024-03-05 NOTE — PROGRESS NOTES
Asa Mann is a 44 year old male here for the following issues:    GI concerns  Last visit 1/9/24  Reported RUQ pain and pain under ribs for about a week  Episodic for 2-3 wks, then awoke from sleep with sx  Symptoms waxed, waned for about at week then slowly improved  US negative for GB disease, bile duct normal caliber   Referral to GI for incidental finding of liver and splenic cysts----appt scheduled 4/9/24    Today  No longer having RUQ   Now with periumbilical pain and midepigastric region,  No burping or dysphagia  Review US result    Constipation/diarrhea  Going to pelvic floor clinic--home exercise program  No longer alternating with constipation/diarrhea  Focal area of discomfort at rectum, occasional blood on toilet paper  Overall improving but still present  Has had proctalgia x2 and can alleviate with pelvic floor exercises    Patient Active Problem List   Diagnosis    Benign neoplasm of skin    History of thyroid disease    Ocular migraine - Both Eyes    Major Depression    Generalized anxiety disorder    Benign prostatic hyperplasia with weak urinary stream    Skin cancer screening    Verruca vulgaris    Seborrheic keratosis    Multiple melanocytic nevi    Hyperlipidemia    Irritable bowel syndrome with both constipation and diarrhea    Pelvic floor dysfunction    Urinary hesitancy       Current Outpatient Medications   Medication Sig Dispense Refill    alfuzosin ER (UROXATRAL) 10 MG 24 hr tablet Take 1 tablet (10 mg) by mouth daily 90 tablet 1    FLUoxetine (PROZAC) 10 MG capsule Take 1 capsule (10 mg) by mouth daily 90 capsule 1    ibuprofen (ADVIL/MOTRIN) 200 MG tablet Take 200 mg by mouth every 4 hours as needed for mild pain      Probiotic Product (PROBIOTIC-10 PO)       psyllium (METAMUCIL/KONSYL) capsule          Allergies   Allergen Reactions    Amoxicillin Other (See Comments)     Tops of legs turned red, hot and itchy    Zithromax [Azithromycin Dihydrate] Diarrhea    Penicillins Rash  "       EXAM  /80 (BP Location: Left arm, Patient Position: Sitting, Cuff Size: Adult Large)   Pulse 82   Temp 97.1  F (36.2  C) (Skin)   Resp 15   Ht 1.88 m (6' 2\")   Wt 93 kg (205 lb)   SpO2 100%   BMI 26.32 kg/m    Gen: Alert, pleasant, NAD  Abdomen: Soft, non tender, normal bowel sounds, no HSM or mass  Cannot reproduce pain with palpation      Assessment:  (K76.89) Hepatic cyst  (primary encounter diagnosis)  (D73.4) Splenic cyst  Comment: incidental finding on Abdominal US, done to check for underlying GB problem. No longer having any discomfort.   Plan: keep appt with GI to evaluate hepatic and splenic cysts.  Suspect midepigastric pain may be related more to episodic gastritis    (K58.2) Irritable bowel syndrome with both constipation and diarrhea  Comment: longstanding history of alternating constipation / diarrhea, going to pelvic floor clinic and symptom improving  Plan: continue pelvic floor exercises    (K62.89) Rectal irritation  Comment: persistent irritation at rectum, DDX is hemorrhoid vs fissure  Plan: recommend trial of cortisone ointment. If not improving refer to colorectal team for evaluation and treatment.     Rita Rios MD  Internal Medicine/Pediatrics    "

## 2024-03-05 NOTE — NURSING NOTE
"44 year old  Chief Complaint   Patient presents with    RECHECK     Follow up on Gi concern. They got an ultrasound and has follow up questions about the results.        Blood pressure 116/80, pulse 82, temperature 97.1  F (36.2  C), temperature source Skin, resp. rate 15, height 1.88 m (6' 2\"), weight 93 kg (205 lb), SpO2 100%. Body mass index is 26.32 kg/m .  Patient Active Problem List   Diagnosis    Benign neoplasm of skin    History of thyroid disease    Ocular migraine - Both Eyes    Major Depression    Generalized anxiety disorder    Benign prostatic hyperplasia with weak urinary stream    Skin cancer screening    Verruca vulgaris    Seborrheic keratosis    Multiple melanocytic nevi    Hyperlipidemia    Irritable bowel syndrome with both constipation and diarrhea    Pelvic floor dysfunction    Urinary hesitancy       Wt Readings from Last 2 Encounters:   03/05/24 93 kg (205 lb)   01/09/24 93.4 kg (206 lb)     BP Readings from Last 3 Encounters:   03/05/24 116/80   01/09/24 110/75   12/15/23 119/72         Current Outpatient Medications   Medication    alfuzosin ER (UROXATRAL) 10 MG 24 hr tablet    FLUoxetine (PROZAC) 10 MG capsule    ibuprofen (ADVIL/MOTRIN) 200 MG tablet    Probiotic Product (PROBIOTIC-10 PO)    psyllium (METAMUCIL/KONSYL) capsule     No current facility-administered medications for this visit.       Social History     Tobacco Use    Smoking status: Never    Smokeless tobacco: Never   Vaping Use    Vaping Use: Never used   Substance Use Topics    Alcohol use: Yes     Comment: 4-5 days per week one beer or glass of wine    Drug use: No       Health Maintenance Due   Topic Date Due    ADVANCE CARE PLANNING  Never done    Pneumococcal Vaccine: Pediatrics (0 to 5 Years) and At-Risk Patients (6 to 64 Years) (1 of 2 - PCV) Never done    LIPID  03/01/2024    YEARLY PREVENTIVE VISIT  03/01/2024       No results found for: \"PAP\"      March 5, 2024 3:48 PM    "

## 2024-03-07 ENCOUNTER — THERAPY VISIT (OUTPATIENT)
Dept: PHYSICAL THERAPY | Facility: CLINIC | Age: 44
End: 2024-03-07
Payer: COMMERCIAL

## 2024-03-07 DIAGNOSIS — R39.11 URINARY HESITANCY: ICD-10-CM

## 2024-03-07 DIAGNOSIS — M62.89 PELVIC FLOOR DYSFUNCTION: ICD-10-CM

## 2024-03-07 DIAGNOSIS — K58.2 IRRITABLE BOWEL SYNDROME WITH BOTH CONSTIPATION AND DIARRHEA: Primary | ICD-10-CM

## 2024-03-07 PROCEDURE — 90912 BFB TRAINING 1ST 15 MIN: CPT | Mod: GP | Performed by: PHYSICAL THERAPIST

## 2024-03-07 PROCEDURE — 97530 THERAPEUTIC ACTIVITIES: CPT | Mod: GP | Performed by: PHYSICAL THERAPIST

## 2024-03-07 PROCEDURE — 97110 THERAPEUTIC EXERCISES: CPT | Mod: GP | Performed by: PHYSICAL THERAPIST

## 2024-03-26 ENCOUNTER — THERAPY VISIT (OUTPATIENT)
Dept: PHYSICAL THERAPY | Facility: CLINIC | Age: 44
End: 2024-03-26
Payer: COMMERCIAL

## 2024-03-26 DIAGNOSIS — K76.89 LIVER CYST: Primary | ICD-10-CM

## 2024-03-26 DIAGNOSIS — K58.2 IRRITABLE BOWEL SYNDROME WITH BOTH CONSTIPATION AND DIARRHEA: Primary | ICD-10-CM

## 2024-03-26 DIAGNOSIS — R39.11 URINARY HESITANCY: ICD-10-CM

## 2024-03-26 DIAGNOSIS — M62.89 PELVIC FLOOR DYSFUNCTION: ICD-10-CM

## 2024-03-26 DIAGNOSIS — Z11.59 ENCOUNTER FOR SCREENING FOR OTHER VIRAL DISEASES: ICD-10-CM

## 2024-03-26 PROCEDURE — 97140 MANUAL THERAPY 1/> REGIONS: CPT | Mod: GP | Performed by: PHYSICAL THERAPIST

## 2024-03-26 PROCEDURE — 97530 THERAPEUTIC ACTIVITIES: CPT | Mod: GP | Performed by: PHYSICAL THERAPIST

## 2024-04-07 DIAGNOSIS — K76.89 LIVER CYST: Primary | ICD-10-CM

## 2024-04-07 NOTE — PROGRESS NOTES
[facility]at[facility]    New Patient Visit    Referring provider:  Rita Rios  Chief complaint:  Liver cysts    Assessment  44 year old male with PMH of IBS with alternating constipation and diarrhea (symptoms controlled with fiber and pelvic floor PT), depression, and hyperlipidemia who presents for evaluation of liver cyst found on ultrasound.     # Right hepatic lobe cyst  Was seen by PCP after having 2-3 weeks of on/off RUQ abdominal discomfort in February 2024. Abd US at the time showed a small cyst in the right lobe of the liver with possible septation (in addition to a heterogenous echogenicity in the spleen). At this time, symptoms have completely resolved. Never had any nausea, vomiting, or fevers/chills during this time period. LFTs only notable for mild ALT elevation for his current age (discussed separately below). Overall, highest suspicion for a simple hepatic cyst. He does have an extensive travel history (has been to [place], [place], and [place]) and eaten a variety of raw foods. Will obtain MRI to further evaluate for possibility of other lesions such as hydatid cyst or neoplasm. MRI would also help assess his splenic abnormality seen on ultrasound.     # Mild ALT elevation  # Hyperlipidemia  # Family history of hyperlipidemia, metabolic syndrome  ALT of 54 on labs today, which is mildly elevated when adjusted for age. Additionally, he has ongoing hyperlipidemia ( in 2023, now 134), not on statin given low ASCVD risk. He does endorse slow weight gain over the last several years since COVID and is currently in the low end of the overweight BMI range. Blood pressure is elevated today, though historically has been normotensive per chart review. He reports a family history of hyperlipidemia and T2DM in his father and possible hyperlipidemia in his mother as well. Given his family history suggestive of metabolic syndrome, ongoing hyperlipidemia, and mild ALT elevation, we will  obtain a fibroscan to assess for signs of hepatic steatosis. We discussed strategies to improve diet awareness and overall weight management.      Plan  - MRI to further evaluate liver cysts and splenic abnormality  - Fibroscan to assess for hepatic steatosis given ALT elevation   - Encouraged continuation of diet and lifestyle modifications to help with weight management, strategies for diet awareness were reviewed at this visit  - Metabolic syndrome and hyperlipidemia management per primary care doctor  - Given hypertensive BP at this visit, recommend follow-up with PCP for continued monitoring and evaluation    RTC: none scheduled at this time, follow-up can be arranged as needed pending imaging results    Patient was seen and discussed with Dr. Hugh Dobbs MD  PGY2, Internal Medicine  [facility]    The patient was seen and examined with the resident/fellow physician or GERMAN.  We have discussed the patient in detail and I agree with the findings, assessment, and plan as documented when this note was cosigned on this day. I have evaluated all laboratory values and imaging studies for the past 24 hours    I spent 45 minutes on this encounter performing the following: reviewing the patient's medical record (clinic visits, hospital records, lab results, imaging and procedural documentation), history taking, physical exam and documentation on the date of the encounter. I also spent part of the time in coordination of care and counseling.    HPI:  Asa Mann is a 44yoM with PMH of IBS with alternating constipation and diarrhea (symptoms controlled with fiber and pelvic floor PT), depression, and hyperlipidemia who presents for evaluation of liver cyst seen on ultrasound.     Patient reports that in February of this year, he was having on/off RUQ discomfort. Rated as about 2-3/10 in intensity. The pain itself was never too bothersome but just felt abnormal to him. He denied any nausea,  vomiting, jaundice, or fevers/chills at that time. No change in the discomfort with relation to eating. Has chronic generalized abdominal discomfort from IBS, which has been controlled overall with pelvic floor PT and fiber supplementation, though this RUQ felt different. He saw his PCP at the start of March for evaluation of this issue, at which time an abdominal ultrasound was ordered and showed liver cysts, with one in the right lobe having possible septation, and an abnormality in his spleen. He was then referred to GI clinic to follow up in these findings.     Since his PCP visit, his symptoms of his RUQ discomfort have completely resolved. He had some concern about the ultrasound findings, since he has a fairly extensive travel history - has been to [place], [place], and most recently [facility] ~1 year ago. He reports having eaten a variety of raw and exotic foods at each of these trips. Aside from a brief diarrheal illness after his trip to [place], he has not had any major health issues from travel. He states that since starting Prozac about 8 years ago, he has noticed a slow, steady weight gain (from 170 lbs at that time to ~200 lbs now). The weight gain may have also been more significant during COVID. He endorses eating a varied diet with plenty of fruits and vegetables, though exercises infrequently.     Medical hx Surgical hx   Past Medical History:   Diagnosis Date    Anxiety     Hyperlipidemia     Snoring       Past Surgical History:   Procedure Laterality Date    APPENDECTOMY  2009          Medications  Current Outpatient Medications   Medication Sig Dispense Refill    alfuzosin ER (UROXATRAL) 10 MG 24 hr tablet Take 1 tablet (10 mg) by mouth daily 90 tablet 1    FLUoxetine (PROZAC) 10 MG capsule Take 1 capsule (10 mg) by mouth daily 90 capsule 1    ibuprofen (ADVIL/MOTRIN) 200 MG tablet Take 200 mg by mouth every 4 hours as needed for mild pain      Probiotic Product (PROBIOTIC-10 PO)       psyllium  "(METAMUCIL/KONSYL) capsule          Allergies  Allergies   Allergen Reactions    Amoxicillin Other (See Comments)     Tops of legs turned red, hot and itchy    Zithromax [Azithromycin Dihydrate] Diarrhea    Penicillins Rash       Family hx Social hx   Family History   Problem Relation Age of Onset    Glaucoma Mother     EYE* Mother         retinal bleed    Other - See Comments Mother 35        Lymes    Diabetes Father     Thyroid Disease Father         multinodular goiter    Lymphoma Father     Stomach Cancer Father 45    Thyroid Disease Sister         hashimoto    Hyperlipidemia Brother 47    Glaucoma Maternal Grandmother     Pancreatic Cancer Maternal Grandfather     Macular Degeneration Paternal Grandmother     Pancreatic Cancer Maternal Uncle     Bladder Cancer Paternal Uncle     Thyroid Disease Paternal Aunt     Glaucoma Other         maternal uncles    EYE* Other         ret bleed/ mat uncle   Father has type 2 diabetes, hyperlipidemia and is overweight    Mother has hyperlipidemia   Social History     Tobacco Use    Smoking status: Never    Smokeless tobacco: Never   Vaping Use    Vaping Use: Never used   Substance Use Topics    Alcohol use: Yes     Alcohol/week: 4.0 - 5.0 standard drinks of alcohol     Types: 4 - 5 Standard drinks or equivalent per week     Comment: 4-5 days per week one beer or glass of wine    Drug use: No     During COVID he was drinking 14 drinks per week and now he is down to 4-5 drinks per week    Denies tobacco use.    THC occasionally     Review of systems  A 10-point review of systems was negative.    Examination  BP (!) 149/82 (BP Location: Right arm, Patient Position: Sitting, Cuff Size: Adult Regular)   Pulse 86   Temp 98  F (36.7  C) (Oral)   Resp 18   Ht 1.88 m (6' 2.02\")   Wt 93.4 kg (206 lb)   SpO2 98%   BMI 26.44 kg/m     Body mass index is 26.44 kg/m .    Gen-NAD  Eye- EOMI  ENT- MMM  CVS- RRR, no murmurs  RS- CTA bilaterally  Abd-overweight, soft, nontender  Extr- " 2+ radial pulses bilaterally, no lower extremity edema bilaterally  MS- hands without clubbing  Neuro- A+Ox3, no asterixis  Skin- no jaundice  Psych- normal mood    Laboratory  BMP  Recent Labs   Lab Test 04/09/24  0814 03/01/23  1348 02/18/22  0952 08/07/17  0830    143 140  --    POTASSIUM 4.4 4.7 4.5  --    CHLORIDE 104 104 104  --    TREASURE 9.5 10.0 9.6  --    CO2 29 28 33*  --    BUN 11.8 12.3 15  --    CR 1.02 1.04 1.01  --    * 93 92 101.0     CBC  Recent Labs   Lab Test 04/09/24  0814   WBC 5.0   RBC 4.99   HGB 16.1   HCT 45.5   MCV 91   MCH 32.3   MCHC 35.4   RDW 11.3        Liver Enzymes   Recent Labs   Lab Test 04/09/24  0814   PROTTOTAL 7.3   ALBUMIN 4.3   BILITOTAL 0.5   ALKPHOS 72   AST 26   ALT 54      INR   INR   Date Value Ref Range Status   04/09/2024 1.05 0.85 - 1.15 Final       Iron: 23% saturation   Hep C Ab non-reactive      Radiology  Abdominal US 1/2024  1. No acute sonographic finding to explain as the patient right upper quadrant pain  2. Focal small hepatic observations, compatible with cysts including mildly complex right lobe cyst with septation, may consider attention on follow-up.  3. Heterogenous echogenicity of the spleen, with anterior peripheral areas of decreased echogenicity; indeterminate, no splenomegaly, may consider CT/ MRI for further evaluation

## 2024-04-09 ENCOUNTER — LAB (OUTPATIENT)
Dept: LAB | Facility: CLINIC | Age: 44
End: 2024-04-09
Attending: INTERNAL MEDICINE
Payer: COMMERCIAL

## 2024-04-09 ENCOUNTER — OFFICE VISIT (OUTPATIENT)
Dept: GASTROENTEROLOGY | Facility: CLINIC | Age: 44
End: 2024-04-09
Attending: INTERNAL MEDICINE
Payer: COMMERCIAL

## 2024-04-09 ENCOUNTER — PRE VISIT (OUTPATIENT)
Dept: GASTROENTEROLOGY | Facility: CLINIC | Age: 44
End: 2024-04-09

## 2024-04-09 VITALS
RESPIRATION RATE: 18 BRPM | SYSTOLIC BLOOD PRESSURE: 149 MMHG | OXYGEN SATURATION: 98 % | HEIGHT: 74 IN | HEART RATE: 86 BPM | WEIGHT: 206 LBS | DIASTOLIC BLOOD PRESSURE: 82 MMHG | TEMPERATURE: 98 F | BODY MASS INDEX: 26.44 KG/M2

## 2024-04-09 DIAGNOSIS — K76.89 LIVER CYST: ICD-10-CM

## 2024-04-09 DIAGNOSIS — K76.89 HEPATIC CYST: ICD-10-CM

## 2024-04-09 DIAGNOSIS — Z11.59 ENCOUNTER FOR SCREENING FOR OTHER VIRAL DISEASES: ICD-10-CM

## 2024-04-09 DIAGNOSIS — R94.5 ABNORMAL RESULTS OF LIVER FUNCTION STUDIES: Primary | ICD-10-CM

## 2024-04-09 DIAGNOSIS — D73.4 SPLENIC CYST: ICD-10-CM

## 2024-04-09 LAB
ALBUMIN SERPL BCG-MCNC: 4.3 G/DL (ref 3.5–5.2)
ALP SERPL-CCNC: 72 U/L (ref 40–150)
ALT SERPL W P-5'-P-CCNC: 54 U/L (ref 0–70)
ANION GAP SERPL CALCULATED.3IONS-SCNC: 9 MMOL/L (ref 7–15)
AST SERPL W P-5'-P-CCNC: 26 U/L (ref 0–45)
BILIRUB DIRECT SERPL-MCNC: <0.2 MG/DL (ref 0–0.3)
BILIRUB SERPL-MCNC: 0.5 MG/DL
BUN SERPL-MCNC: 11.8 MG/DL (ref 6–20)
CALCIUM SERPL-MCNC: 9.5 MG/DL (ref 8.6–10)
CHLORIDE SERPL-SCNC: 104 MMOL/L (ref 98–107)
CHOLEST SERPL-MCNC: 220 MG/DL
CREAT SERPL-MCNC: 1.02 MG/DL (ref 0.67–1.17)
DEPRECATED HCO3 PLAS-SCNC: 29 MMOL/L (ref 22–29)
EGFRCR SERPLBLD CKD-EPI 2021: >90 ML/MIN/1.73M2
ERYTHROCYTE [DISTWIDTH] IN BLOOD BY AUTOMATED COUNT: 11.3 % (ref 10–15)
FASTING STATUS PATIENT QL REPORTED: ABNORMAL
GLUCOSE SERPL-MCNC: 123 MG/DL (ref 70–99)
HAV AB SER QL IA: REACTIVE
HBA1C MFR BLD: 5.3 %
HBV CORE AB SERPL QL IA: NONREACTIVE
HBV SURFACE AB SERPL IA-ACNC: >1000 M[IU]/ML
HBV SURFACE AB SERPL IA-ACNC: REACTIVE M[IU]/ML
HBV SURFACE AG SERPL QL IA: NONREACTIVE
HCT VFR BLD AUTO: 45.5 % (ref 40–53)
HDLC SERPL-MCNC: 41 MG/DL
HGB BLD-MCNC: 16.1 G/DL (ref 13.3–17.7)
INR PPP: 1.05 (ref 0.85–1.15)
LDLC SERPL CALC-MCNC: 134 MG/DL
MCH RBC QN AUTO: 32.3 PG (ref 26.5–33)
MCHC RBC AUTO-ENTMCNC: 35.4 G/DL (ref 31.5–36.5)
MCV RBC AUTO: 91 FL (ref 78–100)
NONHDLC SERPL-MCNC: 179 MG/DL
PLATELET # BLD AUTO: 234 10E3/UL (ref 150–450)
POTASSIUM SERPL-SCNC: 4.4 MMOL/L (ref 3.4–5.3)
PROT SERPL-MCNC: 7.3 G/DL (ref 6.4–8.3)
RBC # BLD AUTO: 4.99 10E6/UL (ref 4.4–5.9)
SODIUM SERPL-SCNC: 142 MMOL/L (ref 135–145)
TRIGL SERPL-MCNC: 224 MG/DL
WBC # BLD AUTO: 5 10E3/UL (ref 4–11)

## 2024-04-09 PROCEDURE — 80053 COMPREHEN METABOLIC PANEL: CPT | Performed by: PATHOLOGY

## 2024-04-09 PROCEDURE — 36415 COLL VENOUS BLD VENIPUNCTURE: CPT | Performed by: PATHOLOGY

## 2024-04-09 PROCEDURE — 99000 SPECIMEN HANDLING OFFICE-LAB: CPT | Performed by: PATHOLOGY

## 2024-04-09 PROCEDURE — 99213 OFFICE O/P EST LOW 20 MIN: CPT | Performed by: INTERNAL MEDICINE

## 2024-04-09 PROCEDURE — 85610 PROTHROMBIN TIME: CPT | Performed by: PATHOLOGY

## 2024-04-09 PROCEDURE — 99215 OFFICE O/P EST HI 40 MIN: CPT | Mod: GC | Performed by: INTERNAL MEDICINE

## 2024-04-09 PROCEDURE — 86708 HEPATITIS A ANTIBODY: CPT | Performed by: INTERNAL MEDICINE

## 2024-04-09 PROCEDURE — 82248 BILIRUBIN DIRECT: CPT | Performed by: PATHOLOGY

## 2024-04-09 PROCEDURE — 80321 ALCOHOLS BIOMARKERS 1OR 2: CPT | Performed by: INTERNAL MEDICINE

## 2024-04-09 PROCEDURE — 86706 HEP B SURFACE ANTIBODY: CPT | Performed by: INTERNAL MEDICINE

## 2024-04-09 PROCEDURE — 85027 COMPLETE CBC AUTOMATED: CPT | Performed by: PATHOLOGY

## 2024-04-09 PROCEDURE — 87340 HEPATITIS B SURFACE AG IA: CPT | Performed by: INTERNAL MEDICINE

## 2024-04-09 PROCEDURE — 83036 HEMOGLOBIN GLYCOSYLATED A1C: CPT | Performed by: INTERNAL MEDICINE

## 2024-04-09 PROCEDURE — 86704 HEP B CORE ANTIBODY TOTAL: CPT | Performed by: INTERNAL MEDICINE

## 2024-04-09 PROCEDURE — 80061 LIPID PANEL: CPT | Performed by: PATHOLOGY

## 2024-04-09 ASSESSMENT — PAIN SCALES - GENERAL: PAINLEVEL: NO PAIN (0)

## 2024-04-09 NOTE — LETTER
4/9/2024         RE: Asa Mann  [address]  [facility] [id number]        Dear Colleague,    Thank you for referring your patient, Asa Mann, to the [facility] [place]. Please see a copy of my visit note below.    [facility]at[facility]    New Patient Visit    Referring provider:  Rita Rios  Chief complaint:  Liver cysts    Assessment  44 year old male with PMH of IBS with alternating constipation and diarrhea (symptoms controlled with fiber and pelvic floor PT), depression, and hyperlipidemia who presents for evaluation of liver cyst found on ultrasound.     # Right hepatic lobe cyst  Was seen by PCP after having 2-3 weeks of on/off RUQ abdominal discomfort in February 2024. Abd US at the time showed a small cyst in the right lobe of the liver with possible septation (in addition to a heterogenous echogenicity in the spleen). At this time, symptoms have completely resolved. Never had any nausea, vomiting, or fevers/chills during this time period. LFTs only notable for mild ALT elevation for his current age (discussed separately below). Overall, highest suspicion for a simple hepatic cyst. He does have an extensive travel history (has been to [place], [place], and [place]) and eaten a variety of raw foods. Will obtain MRI to further evaluate for possibility of other lesions such as hydatid cyst or neoplasm. MRI would also help assess his splenic abnormality seen on ultrasound.     # Mild ALT elevation  # Hyperlipidemia  # Family history of hyperlipidemia, metabolic syndrome  ALT of 54 on labs today, which is mildly elevated when adjusted for age. Additionally, he has ongoing hyperlipidemia ( in 2023, now 134), not on statin given low ASCVD risk. He does endorse slow weight gain over the last several years since COVID and is currently in the low end of the overweight BMI range. Blood pressure is elevated today, though historically has  been normotensive per chart review. He reports a family history of hyperlipidemia and T2DM in his father and possible hyperlipidemia in his mother as well. Given his family history suggestive of metabolic syndrome, ongoing hyperlipidemia, and mild ALT elevation, we will obtain a fibroscan to assess for signs of hepatic steatosis. We discussed strategies to improve diet awareness and overall weight management.      Plan  - MRI to further evaluate liver cysts and splenic abnormality  - Fibroscan to assess for hepatic steatosis given ALT elevation   - Encouraged continuation of diet and lifestyle modifications to help with weight management, strategies for diet awareness were reviewed at this visit  - Metabolic syndrome and hyperlipidemia management per primary care doctor  - Given hypertensive BP at this visit, recommend follow-up with PCP for continued monitoring and evaluation    RTC: none scheduled at this time, follow-up can be arranged as needed pending imaging results    Patient was seen and discussed with Dr. Leroy.     Christopher Dobbs MD  PGY2, Internal Medicine  [facility]    The patient was seen and examined with the resident/fellow physician or GERMAN.  We have discussed the patient in detail and I agree with the findings, assessment, and plan as documented when this note was cosigned on this day. I have evaluated all laboratory values and imaging studies for the past 24 hours    I spent 45 minutes on this encounter performing the following: reviewing the patient's medical record (clinic visits, hospital records, lab results, imaging and procedural documentation), history taking, physical exam and documentation on the date of the encounter. I also spent part of the time in coordination of care and counseling.    HPI:  Asa Mann is a 44yoM with PMH of IBS with alternating constipation and diarrhea (symptoms controlled with fiber and pelvic floor PT), depression, and hyperlipidemia who presents for  evaluation of liver cyst seen on ultrasound.     Patient reports that in February of this year, he was having on/off RUQ discomfort. Rated as about 2-3/10 in intensity. The pain itself was never too bothersome but just felt abnormal to him. He denied any nausea, vomiting, jaundice, or fevers/chills at that time. No change in the discomfort with relation to eating. Has chronic generalized abdominal discomfort from IBS, which has been controlled overall with pelvic floor PT and fiber supplementation, though this RUQ felt different. He saw his PCP at the start of March for evaluation of this issue, at which time an abdominal ultrasound was ordered and showed liver cysts, with one in the right lobe having possible septation, and an abnormality in his spleen. He was then referred to GI clinic to follow up in these findings.     Since his PCP visit, his symptoms of his RUQ discomfort have completely resolved. He had some concern about the ultrasound findings, since he has a fairly extensive travel history - has been to [place], [place], and most recently [facility] ~1 year ago. He reports having eaten a variety of raw and exotic foods at each of these trips. Aside from a brief diarrheal illness after his trip to [place], he has not had any major health issues from travel. He states that since starting Prozac about 8 years ago, he has noticed a slow, steady weight gain (from 170 lbs at that time to ~200 lbs now). The weight gain may have also been more significant during COVID. He endorses eating a varied diet with plenty of fruits and vegetables, though exercises infrequently.     Medical hx Surgical hx   Past Medical History:   Diagnosis Date     Anxiety      Hyperlipidemia      Snoring       Past Surgical History:   Procedure Laterality Date     APPENDECTOMY  2009          Medications  Current Outpatient Medications   Medication Sig Dispense Refill     alfuzosin ER (UROXATRAL) 10 MG 24 hr tablet Take 1 tablet (10 mg) by mouth  daily 90 tablet 1     FLUoxetine (PROZAC) 10 MG capsule Take 1 capsule (10 mg) by mouth daily 90 capsule 1     ibuprofen (ADVIL/MOTRIN) 200 MG tablet Take 200 mg by mouth every 4 hours as needed for mild pain       Probiotic Product (PROBIOTIC-10 PO)        psyllium (METAMUCIL/KONSYL) capsule          Allergies  Allergies   Allergen Reactions     Amoxicillin Other (See Comments)     Tops of legs turned red, hot and itchy     Zithromax [Azithromycin Dihydrate] Diarrhea     Penicillins Rash       Family hx Social hx   Family History   Problem Relation Age of Onset     Glaucoma Mother      EYE* Mother         retinal bleed     Other - See Comments Mother 35        Lymes     Diabetes Father      Thyroid Disease Father         multinodular goiter     Lymphoma Father      Stomach Cancer Father 45     Thyroid Disease Sister         hashimoto     Hyperlipidemia Brother 47     Glaucoma Maternal Grandmother      Pancreatic Cancer Maternal Grandfather      Macular Degeneration Paternal Grandmother      Pancreatic Cancer Maternal Uncle      Bladder Cancer Paternal Uncle      Thyroid Disease Paternal Aunt      Glaucoma Other         maternal uncles     EYE* Other         ret bleed/ mat uncle   Father has type 2 diabetes, hyperlipidemia and is overweight    Mother has hyperlipidemia   Social History     Tobacco Use     Smoking status: Never     Smokeless tobacco: Never   Vaping Use     Vaping Use: Never used   Substance Use Topics     Alcohol use: Yes     Alcohol/week: 4.0 - 5.0 standard drinks of alcohol     Types: 4 - 5 Standard drinks or equivalent per week     Comment: 4-5 days per week one beer or glass of wine     Drug use: No     During COVID he was drinking 14 drinks per week and now he is down to 4-5 drinks per week    Denies tobacco use.    THC occasionally     Review of systems  A 10-point review of systems was negative.    Examination  BP (!) 149/82 (BP Location: Right arm, Patient Position: Sitting, Cuff Size: Adult  "Regular)   Pulse 86   Temp 98  F (36.7  C) (Oral)   Resp 18   Ht 1.88 m (6' 2.02\")   Wt 93.4 kg (206 lb)   SpO2 98%   BMI 26.44 kg/m     Body mass index is 26.44 kg/m .    Gen-NAD  Eye- EOMI  ENT- MMM  CVS- RRR, no murmurs  RS- CTA bilaterally  Abd-overweight, soft, nontender  Extr- 2+ radial pulses bilaterally, no lower extremity edema bilaterally  MS- hands without clubbing  Neuro- A+Ox3, no asterixis  Skin- no jaundice  Psych- normal mood    Laboratory  BMP  Recent Labs   Lab Test 04/09/24  0814 03/01/23  1348 02/18/22  0952 08/07/17  0830    143 140  --    POTASSIUM 4.4 4.7 4.5  --    CHLORIDE 104 104 104  --    TREASURE 9.5 10.0 9.6  --    CO2 29 28 33*  --    BUN 11.8 12.3 15  --    CR 1.02 1.04 1.01  --    * 93 92 101.0     CBC  Recent Labs   Lab Test 04/09/24  0814   WBC 5.0   RBC 4.99   HGB 16.1   HCT 45.5   MCV 91   MCH 32.3   MCHC 35.4   RDW 11.3        Liver Enzymes   Recent Labs   Lab Test 04/09/24  0814   PROTTOTAL 7.3   ALBUMIN 4.3   BILITOTAL 0.5   ALKPHOS 72   AST 26   ALT 54      INR   INR   Date Value Ref Range Status   04/09/2024 1.05 0.85 - 1.15 Final       Iron: 23% saturation   Hep C Ab non-reactive      Radiology  Abdominal US 1/2024  1. No acute sonographic finding to explain as the patient right upper quadrant pain  2. Focal small hepatic observations, compatible with cysts including mildly complex right lobe cyst with septation, may consider attention on follow-up.  3. Heterogenous echogenicity of the spleen, with anterior peripheral areas of decreased echogenicity; indeterminate, no splenomegaly, may consider CT/ MRI for further evaluation        Again, thank you for allowing me to participate in the care of your patient.        Sincerely,        Davina Leroy MD  "

## 2024-04-09 NOTE — NURSING NOTE
"Chief Complaint   Patient presents with    Consult     Liver cysts found on imaging      Vital signs:  Temp: 98  F (36.7  C) Temp src: Oral BP: (!) 149/82 Pulse: 86   Resp: 18 SpO2: 98 %     Height: 188 cm (6' 2.02\") Weight: 93.4 kg (206 lb)  Estimated body mass index is 26.44 kg/m  as calculated from the following:    Height as of this encounter: 1.88 m (6' 2.02\").    Weight as of this encounter: 93.4 kg (206 lb).      Jaylene Bowers, [facility]  4/9/2024 8:42 AM    "

## 2024-04-09 NOTE — PATIENT INSTRUCTIONS
- Plan for MRI to further evaluate the liver and spleen cysts  - Fibroscan to evaluate the degree of fat changes in your liver.  - Continue to work on lifestyle and dietary changes to promote health weight management.   - Follow up with your primary care doctor to track your blood pressure

## 2024-04-11 LAB
LABORATORY REPORT: NORMAL
PETH INTERPRETATION: NORMAL
PLPETH BLD-MCNC: 18 NG/ML
POPETH BLD-MCNC: 24 NG/ML

## 2024-04-14 ENCOUNTER — HEALTH MAINTENANCE LETTER (OUTPATIENT)
Age: 44
End: 2024-04-14

## 2024-04-18 ENCOUNTER — THERAPY VISIT (OUTPATIENT)
Dept: PHYSICAL THERAPY | Facility: CLINIC | Age: 44
End: 2024-04-18
Payer: COMMERCIAL

## 2024-04-18 DIAGNOSIS — R39.11 URINARY HESITANCY: ICD-10-CM

## 2024-04-18 DIAGNOSIS — K58.2 IRRITABLE BOWEL SYNDROME WITH BOTH CONSTIPATION AND DIARRHEA: Primary | ICD-10-CM

## 2024-04-18 DIAGNOSIS — M62.89 PELVIC FLOOR DYSFUNCTION: ICD-10-CM

## 2024-04-18 PROCEDURE — 97110 THERAPEUTIC EXERCISES: CPT | Mod: GP | Performed by: PHYSICAL THERAPIST

## 2024-04-18 PROCEDURE — 97140 MANUAL THERAPY 1/> REGIONS: CPT | Mod: GP | Performed by: PHYSICAL THERAPIST

## 2024-04-18 PROCEDURE — 97530 THERAPEUTIC ACTIVITIES: CPT | Mod: GP | Performed by: PHYSICAL THERAPIST

## 2024-04-22 ENCOUNTER — HOSPITAL ENCOUNTER (OUTPATIENT)
Dept: MRI IMAGING | Facility: CLINIC | Age: 44
Discharge: HOME | End: 2024-04-22
Attending: INTERNAL MEDICINE | Admitting: INTERNAL MEDICINE
Payer: COMMERCIAL

## 2024-04-22 DIAGNOSIS — K76.89 LIVER CYST: ICD-10-CM

## 2024-04-22 PROCEDURE — A9585 GADOBUTROL INJECTION: HCPCS | Performed by: INTERNAL MEDICINE

## 2024-04-22 PROCEDURE — 74183 MRI ABD W/O CNTR FLWD CNTR: CPT

## 2024-04-22 PROCEDURE — 255N000002 HC RX 255 OP 636: Performed by: INTERNAL MEDICINE

## 2024-04-22 PROCEDURE — 74183 MRI ABD W/O CNTR FLWD CNTR: CPT | Mod: 26 | Performed by: RADIOLOGY

## 2024-04-22 RX ORDER — GADOBUTROL 604.72 MG/ML
9 INJECTION INTRAVENOUS ONCE
Status: COMPLETED | OUTPATIENT
Start: 2024-04-22 | End: 2024-04-22

## 2024-04-22 RX ORDER — GADOBUTROL 604.72 MG/ML
10 INJECTION INTRAVENOUS ONCE
Status: DISCONTINUED | OUTPATIENT
Start: 2024-04-22 | End: 2024-04-22 | Stop reason: DRUGHIGH

## 2024-04-22 RX ADMIN — GADOBUTROL 9 ML: 604.72 INJECTION INTRAVENOUS at 11:37

## 2024-04-23 DIAGNOSIS — K76.89 HEPATIC CYST: Primary | ICD-10-CM

## 2024-04-23 DIAGNOSIS — R94.5 ABNORMAL RESULTS OF LIVER FUNCTION STUDIES: ICD-10-CM

## 2024-04-23 PROCEDURE — 91200 LIVER ELASTOGRAPHY: CPT | Mod: 26 | Performed by: PHYSICIAN ASSISTANT

## 2024-05-15 ENCOUNTER — OFFICE VISIT (OUTPATIENT)
Dept: FAMILY MEDICINE | Facility: CLINIC | Age: 44
End: 2024-05-15
Payer: COMMERCIAL

## 2024-05-15 VITALS
OXYGEN SATURATION: 98 % | WEIGHT: 203 LBS | DIASTOLIC BLOOD PRESSURE: 88 MMHG | BODY MASS INDEX: 26.05 KG/M2 | HEART RATE: 94 BPM | TEMPERATURE: 98.2 F | SYSTOLIC BLOOD PRESSURE: 135 MMHG | HEIGHT: 74 IN

## 2024-05-15 DIAGNOSIS — S29.012A UPPER BACK STRAIN, INITIAL ENCOUNTER: ICD-10-CM

## 2024-05-15 DIAGNOSIS — E78.5 HYPERLIPIDEMIA, UNSPECIFIED HYPERLIPIDEMIA TYPE: ICD-10-CM

## 2024-05-15 DIAGNOSIS — K60.2 ANAL FISSURE: Primary | ICD-10-CM

## 2024-05-15 DIAGNOSIS — M25.649 STIFFNESS OF FINGER JOINT, UNSPECIFIED LATERALITY: ICD-10-CM

## 2024-05-15 ASSESSMENT — PATIENT HEALTH QUESTIONNAIRE - PHQ9
SUM OF ALL RESPONSES TO PHQ QUESTIONS 1-9: 3
SUM OF ALL RESPONSES TO PHQ QUESTIONS 1-9: 3
10. IF YOU CHECKED OFF ANY PROBLEMS, HOW DIFFICULT HAVE THESE PROBLEMS MADE IT FOR YOU TO DO YOUR WORK, TAKE CARE OF THINGS AT HOME, OR GET ALONG WITH OTHER PEOPLE: SOMEWHAT DIFFICULT

## 2024-05-15 ASSESSMENT — ANXIETY QUESTIONNAIRES
GAD7 TOTAL SCORE: 4
6. BECOMING EASILY ANNOYED OR IRRITABLE: SEVERAL DAYS
GAD7 TOTAL SCORE: 4
3. WORRYING TOO MUCH ABOUT DIFFERENT THINGS: NOT AT ALL
1. FEELING NERVOUS, ANXIOUS, OR ON EDGE: NOT AT ALL
7. FEELING AFRAID AS IF SOMETHING AWFUL MIGHT HAPPEN: NOT AT ALL
8. IF YOU CHECKED OFF ANY PROBLEMS, HOW DIFFICULT HAVE THESE MADE IT FOR YOU TO DO YOUR WORK, TAKE CARE OF THINGS AT HOME, OR GET ALONG WITH OTHER PEOPLE?: SOMEWHAT DIFFICULT
GAD7 TOTAL SCORE: 4
IF YOU CHECKED OFF ANY PROBLEMS ON THIS QUESTIONNAIRE, HOW DIFFICULT HAVE THESE PROBLEMS MADE IT FOR YOU TO DO YOUR WORK, TAKE CARE OF THINGS AT HOME, OR GET ALONG WITH OTHER PEOPLE: SOMEWHAT DIFFICULT
7. FEELING AFRAID AS IF SOMETHING AWFUL MIGHT HAPPEN: NOT AT ALL
4. TROUBLE RELAXING: SEVERAL DAYS
2. NOT BEING ABLE TO STOP OR CONTROL WORRYING: NOT AT ALL
5. BEING SO RESTLESS THAT IT IS HARD TO SIT STILL: MORE THAN HALF THE DAYS

## 2024-05-15 NOTE — NURSING NOTE
"44 year old  Chief Complaint   Patient presents with    RECHECK     Cortisone levels and cholesterol.     Back Pain     Started about 2-3 weeks ago, mid-back on or around spine.     Hand Pain     Also started about 2-3 weeks ago, pain in joints.        Blood pressure 135/88, pulse 94, temperature 98.2  F (36.8  C), temperature source Skin, height 1.88 m (6' 2.02\"), weight 92.1 kg (203 lb), SpO2 98%. Body mass index is 26.05 kg/m .  Patient Active Problem List   Diagnosis    Benign neoplasm of skin    History of thyroid disease    Ocular migraine - Both Eyes    Major Depression    Generalized anxiety disorder    Benign prostatic hyperplasia with weak urinary stream    Skin cancer screening    Verruca vulgaris    Seborrheic keratosis    Multiple melanocytic nevi    Hyperlipidemia    Irritable bowel syndrome with both constipation and diarrhea    Pelvic floor dysfunction    Urinary hesitancy       Wt Readings from Last 2 Encounters:   05/15/24 92.1 kg (203 lb)   04/09/24 93.4 kg (206 lb)     BP Readings from Last 3 Encounters:   05/15/24 135/88   04/09/24 (!) 149/82   03/05/24 116/80         Current Outpatient Medications   Medication Sig Dispense Refill    alfuzosin ER (UROXATRAL) 10 MG 24 hr tablet Take 1 tablet (10 mg) by mouth daily 90 tablet 1    FLUoxetine (PROZAC) 10 MG capsule Take 1 capsule (10 mg) by mouth daily 90 capsule 1    ibuprofen (ADVIL/MOTRIN) 200 MG tablet Take 200 mg by mouth every 4 hours as needed for mild pain      Probiotic Product (PROBIOTIC-10 PO)       psyllium (METAMUCIL/KONSYL) capsule        No current facility-administered medications for this visit.       Social History     Tobacco Use    Smoking status: Never    Smokeless tobacco: Never   Vaping Use    Vaping status: Never Used   Substance Use Topics    Alcohol use: Yes     Alcohol/week: 4.0 - 5.0 standard drinks of alcohol     Types: 4 - 5 Standard drinks or equivalent per week     Comment: 4-5 days per week one beer or glass of wine " "   Drug use: No       Health Maintenance Due   Topic Date Due    ADVANCE CARE PLANNING  Never done    Pneumococcal Vaccine: Pediatrics (0 to 5 Years) and At-Risk Patients (6 to 64 Years) (1 of 2 - PCV) Never done    YEARLY PREVENTIVE VISIT  03/01/2024       No results found for: \"PAP\"      May 15, 2024 3:06 PM   "

## 2024-05-15 NOTE — PROGRESS NOTES
Asa Mann is a 44 year old male here for the following issues:    Hemorrhoids vs fissure?  Ongoing BRBPR, on toilet tissue since travel to [place] 2021 when he developed traveler's diarrhea. Rx for cortisone cream for hemorrhoids, q od or 3 days was tried s relief  He thinks this is a fissure, would like to see colorectal team  Painful to have BM, no blood in toilet  Previous constipation, benefit form therapy at the Pelvic floor clinic  No fever, no mucous or discharge  Uses Fiber powder 1T psyllium husk into 20oz water    Cholesterol review  Omnivore diet  Nonsmoker  No personal or family hx of early heart disease / no DM  Most recent lipid profile shows lower LDL than previous  [place] score low  Recent Labs   Lab Test 04/09/24  0814 03/01/23  1348 02/18/22  0952 08/07/17  0830   CHOL 220* 225*   < > 240.0*   HDL 41 42   < > 48.0   * 159*   < > 160.0*   TRIG 224* 121   < > 160.0*   CHOLHDLRATIO  --   --   --  5.0    < > = values in this interval not displayed.     Mid back pain  Acute onset of mid back pain, 3 wks ago  Cannot recall injury.   Feels like it is in the thoracic bony spine, and to the right of the spine  Has constant discomfort, dull at rest, vertebrate feels more stiff with movement  Takes Advil occasionally  No numbness  Has new infant daughter and notes pain, tightness over lumbar spine when holding her (8lb+)      Finger stiffness  Awakens from sleep with stiff index and long finger of both hands  not red/swollen  Feels edematous but no visible swelling  Takes a couple of miniutes  to loosen up but aways there low grade      Patient Active Problem List   Diagnosis    Benign neoplasm of skin    History of thyroid disease    Ocular migraine - Both Eyes    Major Depression    Generalized anxiety disorder    Benign prostatic hyperplasia with weak urinary stream    Skin cancer screening    Verruca vulgaris    Seborrheic keratosis    Multiple melanocytic nevi    Hyperlipidemia     "Irritable bowel syndrome with both constipation and diarrhea    Pelvic floor dysfunction    Urinary hesitancy       Current Outpatient Medications   Medication Sig Dispense Refill    alfuzosin ER (UROXATRAL) 10 MG 24 hr tablet Take 1 tablet (10 mg) by mouth daily 90 tablet 1    FLUoxetine (PROZAC) 10 MG capsule Take 1 capsule (10 mg) by mouth daily 90 capsule 1    ibuprofen (ADVIL/MOTRIN) 200 MG tablet Take 200 mg by mouth every 4 hours as needed for mild pain      Probiotic Product (PROBIOTIC-10 PO)       psyllium (METAMUCIL/KONSYL) capsule          Allergies   Allergen Reactions    Amoxicillin Other (See Comments)     Tops of legs turned red, hot and itchy    Seasonal Allergies     Zithromax [Azithromycin Dihydrate] Diarrhea    Penicillins Rash        EXAM  /88 (BP Location: Left arm, Patient Position: Sitting, Cuff Size: Adult Regular)   Pulse 94   Temp 98.2  F (36.8  C) (Skin)   Ht 1.88 m (6' 2.02\")   Wt 92.1 kg (203 lb)   SpO2 98%   BMI 26.05 kg/m    Gen: Alert, pleasant, NAD  MS: No tenderness over the bony T, or LS spinous bodies  Point tenderness with palpation over the right para thoracic muscles, right side, extending from landmark inferior to right scapula to upper para lumbar area.   Forward flexion intact.   Hands: tenderness at PIP joints of index and long fingers, no redness/swelling  Neuro: Phalen test negative.       Assessment:  (K60.2) Anal fissure  (primary encounter diagnosis)  Comment: longstanding hx of blood on toilet paper, some pain, concern about fissure, did not improve with topical cortisone  Plan: Adult Colorectal Surgery  Referral        Refer on to colorectal surgery to evaluate and treat.     (S29.012A) Upper back strain, initial encounter  Comment: right para thoracic muscle tightness with palpation, no tenderness over bony spine, suspect muscle strain  Plan: reassurance. Recommend heat, massage. Gave handout on back strengthening exercises. May contact me if he " wishes to have formal consultation with PT    (M25.649) Stiffness of finger joint, unspecified laterality  Comment: suspect this is early osteoarthritis in index and long fingers both hands, PIP joints  Plan: discussed use of analgesics as needed.     (E78.5) Hyperlipidemia, unspecified hyperlipidemia type  Comment: he wishes to review most recent lipid profiles  Recent Labs   Lab Test 04/09/24  0814 03/01/23  1348 02/18/22  0952 08/07/17  0830   CHOL 220* 225*   < > 240.0*   HDL 41 42   < > 48.0   * 159*   < > 160.0*   TRIG 224* 121   < > 160.0*   CHOLHDLRATIO  --   --   --  5.0    < > = values in this interval not displayed.   Plan: LDL cholesterol improving. [place] score is low.  No need for statin medication at this time.     Return prn    38 minutes spend on the date of this encounter doing chart review, history and exam, documentation and further activities as noted above.       Rita Rios MD  Internal Medicine/Pediatrics        Answers submitted by the patient for this visit:  Patient Health Questionnaire (Submitted on 5/15/2024)  If you checked off any problems, how difficult have these problems made it for you to do your work, take care of things at home, or get along with other people?: Somewhat difficult  PHQ9 TOTAL SCORE: 3  NAVYA-7 (Submitted on 5/15/2024)  NAVYA 7 TOTAL SCORE: 4

## 2024-05-15 NOTE — PATIENT INSTRUCTIONS
The 10-year ASCVD risk score (Pedro PELAYO, et al., 2019) is: 2.5%    Values used to calculate the score:      Age: 44 years      Sex: Male      Is Non- : No      Diabetic: No      Tobacco smoker: No      Systolic Blood Pressure: 135 mmHg      Is BP treated: No      HDL Cholesterol: 41 mg/dL      Total Cholesterol: 220 mg/dL

## 2024-05-28 DIAGNOSIS — F41.1 GENERALIZED ANXIETY DISORDER: ICD-10-CM

## 2024-05-28 DIAGNOSIS — N40.1 BENIGN PROSTATIC HYPERPLASIA WITH WEAK URINARY STREAM: ICD-10-CM

## 2024-05-28 DIAGNOSIS — R39.12 BENIGN PROSTATIC HYPERPLASIA WITH WEAK URINARY STREAM: ICD-10-CM

## 2024-05-29 RX ORDER — ALFUZOSIN HYDROCHLORIDE 10 MG/1
10 TABLET, EXTENDED RELEASE ORAL DAILY
Qty: 90 TABLET | Refills: 1 | Status: SHIPPED | OUTPATIENT
Start: 2024-05-29

## 2024-05-29 RX ORDER — FLUOXETINE 10 MG/1
10 CAPSULE ORAL DAILY
Qty: 90 CAPSULE | Refills: 1 | Status: SHIPPED | OUTPATIENT
Start: 2024-05-29

## 2024-05-29 NOTE — TELEPHONE ENCOUNTER
Medication requested:   1) FLUoxetine (PROZAC) 10 MG capsule   2) alfuzosin ER (UROXATRAL) 10 MG 24 hr tablet   Last office visit: 5/15/2024  [facility] appointments: none  Medication last refilled: 12/4/2023; 90 + 1 refill     5/15/2024  2:31 PM   PHQ-9 and GAD7 Scores    PHQ9 TOTAL SCORE 3 (Minimal depression)    PHQ-9 Total Score 3    NAVYA-7 Total Score 4      Prescription approved per [facility] Refill Protocol.    MADELAINE Srivastava, RN  05/29/24, 3:37 PM

## 2024-05-31 NOTE — TELEPHONE ENCOUNTER
Diagnosis, Referred by & from: Anal Fissure   Appt date: 8/23/2024   NOTES STATUS DETAILS   OFFICE NOTE from referring provider Internal [facility]:  5/15/24, 3/5/24 - PCC OV with Dr. Rios   OFFICE NOTE from other specialist Care Everywhere / Internal [facility]:  4/9/24 - HEP OV with Dr. Leroy    [facility]:  8/8/23 - UC OV with Dr. Lopez   DISCHARGE SUMMARY from hospital N/A    DISCHARGE REPORT from the ER N/A    OPERATIVE REPORT [facility]:  5/1/09 - OP Note for LAPAROSCOPIC APPENDECTOMY with Dr. Sorto   MEDICATION LIST Internal    LABS N/A    DIAGNOSTIC PROCEDURES N/A    IMAGING (DISC & REPORT)      MRI [facility]:  4/22/24 - MRI Abdomen   ULTRASOUND  (ENDOANAL/ENDORECTAL) [facility]:  1/13/24 - US Abdomen

## 2024-07-15 ENCOUNTER — TELEPHONE (OUTPATIENT)
Dept: FAMILY MEDICINE | Facility: CLINIC | Age: 44
End: 2024-07-15

## 2024-07-15 NOTE — TELEPHONE ENCOUNTER
Symptoms (route to triage team)    Who is calling - Asa   What is the symptom/s being reported - Just drank contaminated gatorade   When did the symptom/s begin -today around 1pm   Additional comments/details - Would like next steps   Same day office visit offered? No  Advised patient that RN will call back within 3 hours? Yes  Ok to leave a message on VM? Yes

## 2024-07-15 NOTE — TELEPHONE ENCOUNTER
"Pt reports that he drank spoiled Gatorade at 1 PM this afternoon. He suspects the seal of the bottle was open when sold and notes some strands of clear substance in bottom of bottle. He only drank one sip but also noted it \"tasted off\". Advised him that likely there is very little risk but to keep us informed if he develops any symptoms. Also advised he could call poison control for further advice if needed.    Mohit BURKETT, RN  07/15/24 3:07 PM  "

## 2024-08-23 ENCOUNTER — TELEPHONE (OUTPATIENT)
Dept: SURGERY | Facility: CLINIC | Age: 44
End: 2024-08-23

## 2024-08-23 ENCOUNTER — OFFICE VISIT (OUTPATIENT)
Dept: SURGERY | Facility: CLINIC | Age: 44
End: 2024-08-23
Attending: INTERNAL MEDICINE
Payer: COMMERCIAL

## 2024-08-23 ENCOUNTER — PRE VISIT (OUTPATIENT)
Dept: SURGERY | Facility: CLINIC | Age: 44
End: 2024-08-23

## 2024-08-23 VITALS — DIASTOLIC BLOOD PRESSURE: 79 MMHG | SYSTOLIC BLOOD PRESSURE: 117 MMHG | HEART RATE: 101 BPM | OXYGEN SATURATION: 99 %

## 2024-08-23 DIAGNOSIS — L29.0 PERIANAL IRRITATION: Primary | ICD-10-CM

## 2024-08-23 PROCEDURE — 46600 DIAGNOSTIC ANOSCOPY SPX: CPT

## 2024-08-23 PROCEDURE — 99203 OFFICE O/P NEW LOW 30 MIN: CPT | Mod: 25

## 2024-08-23 ASSESSMENT — PAIN SCALES - GENERAL: PAINLEVEL: NO PAIN (0)

## 2024-08-23 NOTE — NURSING NOTE
Chief Complaint   Patient presents with    Consult       Vitals:    08/23/24 1056   BP: 117/79   BP Location: Left arm   Patient Position: Sitting   Cuff Size: Adult Regular   Pulse: 101   SpO2: 99%       There is no height or weight on file to calculate BMI.    Sang Treviño EMT-P

## 2024-08-23 NOTE — LETTER
8/23/2024       RE: Asa Mann  [address]  [facility] [address]     Dear Colleague,    Thank you for referring your patient, Asa Mann, to the [facility] COLON AND RECTAL SURGERY CLINIC [place] at [facility]. Please see a copy of my visit note below.    Colon and Rectal Surgery Consult Clinic Note    Referring provider:  Rita Rios MD  [address] [facility] [place] [address]     Patient: Asa Mann  YOB: 1980  Date of Visit: 8/23/2024    Asa Mann is a very pleasant 44 year old male here with concerns for anal fissure.    Asa reports intermittent rectal pain and bleeding for years. The pain occurs with most bowel movements and he has not gone more than 1-2 weeks without symptoms. The blood is usually with wiping, occasionally a tiny streak on the stool. He also notes that the perineum skin is more pink than normal. About 1.5 years ago he started 1 heaping teaspoon of psyllium daily and this helped reduced the severity of symptoms. Recently he describes the pain as a scratchy feeling. He also has been using topical hydrocortisone 1% daily because he thinks it is helpful for the bleeding. Typically he has 1 bowel movement daily that is formed. He has been to pelvic floor physical therapy for pelvic floor dysfunction and this has been very helpful. He also reports that his bowel movements can easily get more constipated or loose due to IBS.    No prior colonoscopy.  No family history of colorectal cancer.    Physical Examination:  /79 (BP Location: Left arm, Patient Position: Sitting, Cuff Size: Adult Regular)   Pulse 101   SpO2 99%   General: alert, oriented, in no acute distress, sitting comfortably  Respiratory: non-labored breathing on RA  Chaperone: Sang Treviño, EMT-P   Perianal External Examination: Perianal skin is intact but some irritation anteriorly. On touch of the  "right anterior, there is reproducible \"scratchy\" sensation. No anal fissure. No skin breakdown.     Digital rectal examination: Was performed.   Sphincter tone: Hypertonic  Palpable lesions: No.  Prostate: Not assessed.  Other: None..    Anoscopy: Was performed.   Hemorrhoids: Yes. Small internal hemorrhoids throughout.  Lesions: No. Notably no anal fissure seen.    Assessment/Plan: Asa Mann is a 44 year old male with perianal irritation. I suspect that he may have had a fissure previously but this appears resolved. There appears to be ongoing irritation which may be related to the chronic topical hydrocortisone use. Advised discontinuing this and starting Calmoseptine and Calmol-4 suppositories. Return to clinic if this does not help. Patient's questions were answered to his stated satisfaction and he is in agreement with this plan.       Past Medical History:   Diagnosis Date     Anxiety      Hyperlipidemia      Snoring      Past Surgical History:   Procedure Laterality Date     APPENDECTOMY  2009     Current Outpatient Medications   Medication Sig Dispense Refill     alfuzosin ER (UROXATRAL) 10 MG 24 hr tablet Take 1 tablet (10 mg) by mouth daily 90 tablet 1     FLUoxetine (PROZAC) 10 MG capsule Take 1 capsule (10 mg) by mouth daily 90 capsule 1     ibuprofen (ADVIL/MOTRIN) 200 MG tablet Take 200 mg by mouth every 4 hours as needed for mild pain       Probiotic Product (PROBIOTIC-10 PO)        psyllium (METAMUCIL/KONSYL) capsule        Allergies   Allergen Reactions     Amoxicillin Other (See Comments)     Tops of legs turned red, hot and itchy     Seasonal Allergies      Zithromax [Azithromycin Dihydrate] Diarrhea     Penicillins Rash     Family History   Problem Relation Age of Onset     Glaucoma Mother      EYE* Mother         retinal bleed     Other - See Comments Mother 35        Lymes     Diabetes Father      Thyroid Disease Father         multinodular goiter     Lymphoma Father      Stomach Cancer " Father 45     Hyperlipidemia Father      Thyroid Disease Sister         hashimoto     Hyperlipidemia Brother 47     Glaucoma Maternal Grandmother      Pancreatic Cancer Maternal Grandfather      Macular Degeneration Paternal Grandmother      Heart Failure Paternal Grandmother 90     Aortic aneurysm Paternal Grandfather      Alcoholism Paternal Grandfather      Pancreatic Cancer Maternal Uncle      Thyroid Disease Paternal Aunt      Bladder Cancer Paternal Uncle      Heart Failure Paternal Uncle 80     Glaucoma Other         maternal uncles     EYE* Other         ret bleed/ mat uncle     Social History     Tobacco Use     Smoking status: Never     Smokeless tobacco: Never   Substance Use Topics     Alcohol use: Yes     Alcohol/week: 4.0 - 5.0 standard drinks of alcohol     Types: 4 - 5 Standard drinks or equivalent per week     Comment: 4-5 days per week one beer or glass of wine    Marital status: .    Princess Seymour PA-C  Colon and Rectal Surgery   [facility]      I spent a total of 30 minutes on the day of the visit.  Time spent on date of encounter doing chart review, history and exam, documentation, and further activities in this note.       Again, thank you for allowing me to participate in the care of your patient.      Sincerely,    Princess Seymour PA-C

## 2024-08-23 NOTE — PATIENT INSTRUCTIONS
It was very nice to meet you today!    Start applying Calmoseptine around the perianal skin  Use the Calmol-4 suppositories at night  You can stop using these when your symptoms resolve and then start using them again as needed  Return to clinic as needed

## 2024-08-23 NOTE — TELEPHONE ENCOUNTER
Patient confirmed scheduled appointment:  Date: 11/25/24  Time: 11 am  Visit type: RET PATIENT  Provider: Davina Reeder  Location: [facility] - 4th floor  Testing/imaging: n/a  Additional notes: n/a

## 2024-08-23 NOTE — PROGRESS NOTES
"Colon and Rectal Surgery Consult Clinic Note    Referring provider:  Rita Rios MD  [address] [place] [id number]     Patient: Asa Mann  YOB: 1980  Date of Visit: 8/23/2024    Asa Mann is a very pleasant 44 year old male here with concerns for anal fissure.    Asa reports intermittent rectal pain and bleeding for years. The pain occurs with most bowel movements and he has not gone more than 1-2 weeks without symptoms. The blood is usually with wiping, occasionally a tiny streak on the stool. He also notes that the perineum skin is more pink than normal. About 1.5 years ago he started 1 heaping teaspoon of psyllium daily and this helped reduced the severity of symptoms. Recently he describes the pain as a scratchy feeling. He also has been using topical hydrocortisone 1% daily because he thinks it is helpful for the bleeding. Typically he has 1 bowel movement daily that is formed. He has been to pelvic floor physical therapy for pelvic floor dysfunction and this has been very helpful. He also reports that his bowel movements can easily get more constipated or loose due to IBS.    No prior colonoscopy.  No family history of colorectal cancer.    Physical Examination:  /79 (BP Location: Left arm, Patient Position: Sitting, Cuff Size: Adult Regular)   Pulse 101   SpO2 99%   General: alert, oriented, in no acute distress, sitting comfortably  Respiratory: non-labored breathing on RA  Chaperone: Sang Treviño, EMT-P   Perianal External Examination: Perianal skin is intact but some irritation anteriorly. On touch of the right anterior, there is reproducible \"scratchy\" sensation. No anal fissure. No skin breakdown.     Digital rectal examination: Was performed.   Sphincter tone: Hypertonic  Palpable lesions: No.  Prostate: Not assessed.  Other: None..    Anoscopy: Was performed.   Hemorrhoids: Yes. Small internal hemorrhoids throughout.  Lesions: No. " Notably no anal fissure seen.    Assessment/Plan: Asa Mann is a 44 year old male with perianal irritation. I suspect that he may have had a fissure previously but this appears resolved. There appears to be ongoing irritation which may be related to the chronic topical hydrocortisone use. Advised discontinuing this and starting Calmoseptine and Calmol-4 suppositories. Return to clinic if this does not help. Patient's questions were answered to his stated satisfaction and he is in agreement with this plan.       Past Medical History:   Diagnosis Date    Anxiety     Hyperlipidemia     Snoring      Past Surgical History:   Procedure Laterality Date    APPENDECTOMY  2009     Current Outpatient Medications   Medication Sig Dispense Refill    alfuzosin ER (UROXATRAL) 10 MG 24 hr tablet Take 1 tablet (10 mg) by mouth daily 90 tablet 1    FLUoxetine (PROZAC) 10 MG capsule Take 1 capsule (10 mg) by mouth daily 90 capsule 1    ibuprofen (ADVIL/MOTRIN) 200 MG tablet Take 200 mg by mouth every 4 hours as needed for mild pain      Probiotic Product (PROBIOTIC-10 PO)       psyllium (METAMUCIL/KONSYL) capsule        Allergies   Allergen Reactions    Amoxicillin Other (See Comments)     Tops of legs turned red, hot and itchy    Seasonal Allergies     Zithromax [Azithromycin Dihydrate] Diarrhea    Penicillins Rash     Family History   Problem Relation Age of Onset    Glaucoma Mother     EYE* Mother         retinal bleed    Other - See Comments Mother 35        Lymes    Diabetes Father     Thyroid Disease Father         multinodular goiter    Lymphoma Father     Stomach Cancer Father 45    Hyperlipidemia Father     Thyroid Disease Sister         hashimoto    Hyperlipidemia Brother 47    Glaucoma Maternal Grandmother     Pancreatic Cancer Maternal Grandfather     Macular Degeneration Paternal Grandmother     Heart Failure Paternal Grandmother 90    Aortic aneurysm Paternal Grandfather     Alcoholism Paternal Grandfather      Pancreatic Cancer Maternal Uncle     Thyroid Disease Paternal Aunt     Bladder Cancer Paternal Uncle     Heart Failure Paternal Uncle 80    Glaucoma Other         maternal uncles    EYE* Other         ret bleed/ mat uncle     Social History     Tobacco Use    Smoking status: Never    Smokeless tobacco: Never   Substance Use Topics    Alcohol use: Yes     Alcohol/week: 4.0 - 5.0 standard drinks of alcohol     Types: 4 - 5 Standard drinks or equivalent per week     Comment: 4-5 days per week one beer or glass of wine    Marital status: .    Princess Seymour PA-C  Colon and Rectal Surgery   [facility]      I spent a total of 30 minutes on the day of the visit.  Time spent on date of encounter doing chart review, history and exam, documentation, and further activities in this note.

## 2024-11-30 ENCOUNTER — MYC REFILL (OUTPATIENT)
Dept: FAMILY MEDICINE | Facility: CLINIC | Age: 44
End: 2024-11-30

## 2024-11-30 DIAGNOSIS — N40.1 BENIGN PROSTATIC HYPERPLASIA WITH WEAK URINARY STREAM: ICD-10-CM

## 2024-11-30 DIAGNOSIS — R39.12 BENIGN PROSTATIC HYPERPLASIA WITH WEAK URINARY STREAM: ICD-10-CM

## 2024-11-30 DIAGNOSIS — F41.1 GENERALIZED ANXIETY DISORDER: ICD-10-CM

## 2024-12-01 DIAGNOSIS — F41.1 GENERALIZED ANXIETY DISORDER: ICD-10-CM

## 2024-12-01 DIAGNOSIS — N40.1 BENIGN PROSTATIC HYPERPLASIA WITH WEAK URINARY STREAM: ICD-10-CM

## 2024-12-01 DIAGNOSIS — R39.12 BENIGN PROSTATIC HYPERPLASIA WITH WEAK URINARY STREAM: ICD-10-CM

## 2024-12-02 RX ORDER — ALFUZOSIN HYDROCHLORIDE 10 MG/1
10 TABLET, EXTENDED RELEASE ORAL DAILY
Qty: 30 TABLET | Refills: 0 | Status: SHIPPED | OUTPATIENT
Start: 2024-12-02

## 2024-12-02 RX ORDER — FLUOXETINE 10 MG/1
10 CAPSULE ORAL DAILY
Qty: 90 CAPSULE | Refills: 1 | OUTPATIENT
Start: 2024-12-02

## 2024-12-02 RX ORDER — ALFUZOSIN HYDROCHLORIDE 10 MG/1
10 TABLET, EXTENDED RELEASE ORAL DAILY
Qty: 90 TABLET | Refills: 1 | OUTPATIENT
Start: 2024-12-02

## 2024-12-02 RX ORDER — FLUOXETINE 10 MG/1
10 CAPSULE ORAL DAILY
Qty: 30 CAPSULE | Refills: 0 | Status: SHIPPED | OUTPATIENT
Start: 2024-12-02

## 2024-12-02 NOTE — TELEPHONE ENCOUNTER
Medication requested: alfuzosin ER (UROXATRAL) 10 MG 24 hr tablet   Last office visit: 5/15/24  [facility] appointments: none  Medication last refilled: 5/29/24; 90 + 1 refill  Last qualifying labs:   BP Readings from Last 3 Encounters:   08/23/24 117/79   05/15/24 135/88   04/09/24 (!) 149/82     Medication requested: FLUoxetine (PROZAC) 10 MG capsule   Medication last refilled: 5/29/24; 90 + 1 refill  Last qualifying labs:    5/15/2024  2:31 PM   PHQ-9 / NAVYA-7 Scores 8/2015 to present    NAVYA-7 Score DocFlow 4       5/15/2024  2:31 PM   PHQ-9 / NAVYA-7 Scores 8/2015 to present    PHQ-9 Score DocFlow 3      Medication is being filled for 1 time refill only due to:  Patient needs to be seen because due for follow-up appt.    Sent message to pt to schedule follow-up appt.    Mohit BURKETT, RN  12/02/24 12:16 PM

## 2024-12-09 ASSESSMENT — ANXIETY QUESTIONNAIRES
7. FEELING AFRAID AS IF SOMETHING AWFUL MIGHT HAPPEN: NOT AT ALL
IF YOU CHECKED OFF ANY PROBLEMS ON THIS QUESTIONNAIRE, HOW DIFFICULT HAVE THESE PROBLEMS MADE IT FOR YOU TO DO YOUR WORK, TAKE CARE OF THINGS AT HOME, OR GET ALONG WITH OTHER PEOPLE: SOMEWHAT DIFFICULT
8. IF YOU CHECKED OFF ANY PROBLEMS, HOW DIFFICULT HAVE THESE MADE IT FOR YOU TO DO YOUR WORK, TAKE CARE OF THINGS AT HOME, OR GET ALONG WITH OTHER PEOPLE?: SOMEWHAT DIFFICULT
6. BECOMING EASILY ANNOYED OR IRRITABLE: SEVERAL DAYS
4. TROUBLE RELAXING: NEARLY EVERY DAY
2. NOT BEING ABLE TO STOP OR CONTROL WORRYING: NOT AT ALL
GAD7 TOTAL SCORE: 8
GAD7 TOTAL SCORE: 8
3. WORRYING TOO MUCH ABOUT DIFFERENT THINGS: NOT AT ALL
GAD7 TOTAL SCORE: 8
7. FEELING AFRAID AS IF SOMETHING AWFUL MIGHT HAPPEN: NOT AT ALL
5. BEING SO RESTLESS THAT IT IS HARD TO SIT STILL: NEARLY EVERY DAY
1. FEELING NERVOUS, ANXIOUS, OR ON EDGE: SEVERAL DAYS

## 2024-12-09 ASSESSMENT — PATIENT HEALTH QUESTIONNAIRE - PHQ9
SUM OF ALL RESPONSES TO PHQ QUESTIONS 1-9: 5
SUM OF ALL RESPONSES TO PHQ QUESTIONS 1-9: 5
10. IF YOU CHECKED OFF ANY PROBLEMS, HOW DIFFICULT HAVE THESE PROBLEMS MADE IT FOR YOU TO DO YOUR WORK, TAKE CARE OF THINGS AT HOME, OR GET ALONG WITH OTHER PEOPLE: SOMEWHAT DIFFICULT

## 2024-12-10 ENCOUNTER — OFFICE VISIT (OUTPATIENT)
Dept: FAMILY MEDICINE | Facility: CLINIC | Age: 44
End: 2024-12-10
Payer: COMMERCIAL

## 2024-12-10 VITALS
SYSTOLIC BLOOD PRESSURE: 124 MMHG | RESPIRATION RATE: 16 BRPM | BODY MASS INDEX: 26.16 KG/M2 | DIASTOLIC BLOOD PRESSURE: 76 MMHG | OXYGEN SATURATION: 99 % | WEIGHT: 203.8 LBS | TEMPERATURE: 97.3 F | HEART RATE: 86 BPM

## 2024-12-10 DIAGNOSIS — R39.12 BENIGN PROSTATIC HYPERPLASIA WITH WEAK URINARY STREAM: ICD-10-CM

## 2024-12-10 DIAGNOSIS — F41.1 GENERALIZED ANXIETY DISORDER: ICD-10-CM

## 2024-12-10 DIAGNOSIS — N40.1 BENIGN PROSTATIC HYPERPLASIA WITH WEAK URINARY STREAM: ICD-10-CM

## 2024-12-10 DIAGNOSIS — I95.1 ORTHOSTATIC HYPOTENSION: ICD-10-CM

## 2024-12-10 DIAGNOSIS — M79.671 RIGHT FOOT PAIN: Primary | ICD-10-CM

## 2024-12-10 RX ORDER — ALFUZOSIN HYDROCHLORIDE 10 MG/1
10 TABLET, EXTENDED RELEASE ORAL DAILY
Qty: 90 TABLET | Refills: 3 | Status: SHIPPED | OUTPATIENT
Start: 2024-12-10

## 2024-12-10 RX ORDER — FLUOXETINE 10 MG/1
10 CAPSULE ORAL DAILY
Qty: 90 CAPSULE | Refills: 3 | Status: SHIPPED | OUTPATIENT
Start: 2024-12-10

## 2024-12-10 ASSESSMENT — PAIN SCALES - GENERAL: PAINLEVEL_OUTOF10: NO PAIN (1)

## 2024-12-10 NOTE — PROGRESS NOTES
[facility] [address], [address]  [facility] [address]  Phone: [phone]  Fax: [phone]    Patient:  Asa Mann 1980  Date of Visit:  10:33 AM  Referring Provider Referred Self      Assessment & Plan    (M79.660) Right foot pain  (primary encounter diagnosis)  Comment: suspect stress fracture, he declines Xray, would like to try CAM walker again  Plan: CANCELED: Ankle/Foot Bracing Supplies Order         Walking Boot; Right; Non-pneumatic; Tall        Tried fitting him today with tall CAM walker (medium) but he needs large boot. As we are ordering this he asks for shorter boot which is appropriate. Will order boot and ask him to return to nurse visit to ensure good fit. Wear CAM walker during the day for next 4-6 wks. If pain not improving would refer to podiatry for troubleshooting.     (F41.1) Generalized anxiety disorder  Comment: hx of sexual dysfunction with Fluoxetine 20mg, improved with 10mg dose, anxiety doing OK  Plan: FLUoxetine (PROZAC) 10 MG capsule        Refilled fluoxetine 10mg dose, continue therapy    (N40.1,  R39.12) Benign prostatic hyperplasia with weak urinary stream  Comment: currently on max dose of medication, still with some dampened flow  Plan: alfuzosin ER (UROXATRAL) 10 MG 24 hr tablet,         Adult Urology  Referral        Refilled med but referred to urologist for follow up given progression of symptoms.     (I95.1) Orthostatic hypotension  Comment: notes that with quick position change can feel lightheaded, sometimes sees start  Plan: discussed symptoms of orthostatic hypotension. Be mindful of hydration, eat regular meals. Contact MD if developing lightheadedness in setting of exertion, heart palpitations.    38 minutes spend on the date of this encounter doing chart review, history and exam, documentation and further activities as noted above.     MD Asa Tavarez is  a 44 year old male here for the following issues:    Right foot pain  History of stress fracture 2011 after doing a lot of walking while on vacation, xray was normal. He was treated conservatively with CAM walker and symptoms improved  Now with similar pain at the 5th metatarsal, plantar surface,right foot x 2 months  No recall of injury  Walking can trigger pain 7-8 /10  Worse with walking, better with rest  Takes Advil  No swelling or redness  Splaying toes trigger pain  Mid 5 th metatarsal with deep pressure triggers pain    Generalized anxiety disorder  Fluoxetine 10mg , lowered  from 20  (sexual dysfunction)  Therapy 2x per week  Stressors: no significant issues  Weight gain, 190 lb 2021, up 13 lb. Up 10 lb since last year  Walks   Sits for job, less active  No longer having panic attacks  Wt Readings from Last 4 Encounters:   12/10/24 92.4 kg (203 lb 12.8 oz)   05/15/24 92.1 kg (203 lb)   04/09/24 93.4 kg (206 lb)   03/05/24 93 kg (205 lb)         3/5/2024     3:46 PM 5/15/2024     2:31 PM 12/9/2024    12:16 PM   PHQ   PHQ-9 Total Score 2 3 5    Q9: Thoughts of better off dead/self-harm past 2 weeks Not at all Not at all  Not at all        Patient-reported         1/9/2024     2:11 PM 5/15/2024     2:31 PM 12/9/2024    12:19 PM   NAVYA-7 SCORE   Total Score  4 (minimal anxiety) 8 (mild anxiety)   Total Score 1 4 8        Patient-reported     ADHD  Saw psychologist for neuropysch testing in spring 2024  Dr. Grant  Will send me copy of notes  Was told he had mixed picture    Benign prostatic hyperplasia with weak urinary stream  Uroxatral 10mg dose  Stream is more dampened  No burning or blood in urine  Has been taking x 5 yr  Refer to urology    Lightheaded  Notes lightheaded with position change  Getting up rapidly from floor  Reclining chair then getting up  No chest pain or palpitations.     Patient Active Problem List   Diagnosis    Benign neoplasm of skin    History of thyroid disease    Ocular migraine -  Both Eyes    Major Depression    Generalized anxiety disorder    Benign prostatic hyperplasia with weak urinary stream    Skin cancer screening    Verruca vulgaris    Seborrheic keratosis    Multiple melanocytic nevi    Hyperlipidemia    Irritable bowel syndrome with both constipation and diarrhea    Pelvic floor dysfunction    Urinary hesitancy       Current Outpatient Medications   Medication Sig Dispense Refill    alfuzosin ER (UROXATRAL) 10 MG 24 hr tablet Take 1 tablet (10 mg) by mouth daily. 30 tablet 0    FLUoxetine (PROZAC) 10 MG capsule Take 1 capsule (10 mg) by mouth daily. 30 capsule 0    ibuprofen (ADVIL/MOTRIN) 200 MG tablet Take 200 mg by mouth every 4 hours as needed for mild pain      Probiotic Product (PROBIOTIC-10 PO)       psyllium (METAMUCIL/KONSYL) capsule          Allergies   Allergen Reactions    Amoxicillin Other (See Comments)     Tops of legs turned red, hot and itchy    Seasonal Allergies     Zithromax [Azithromycin Dihydrate] Diarrhea    Penicillins Rash        EXAM  /76 (BP Location: Left arm, Patient Position: Sitting, Cuff Size: Adult Regular)   Pulse 86   Temp 97.3  F (36.3  C) (Skin)   Resp 16   Wt 92.4 kg (203 lb 12.8 oz)   SpO2 99%   BMI 26.16 kg/m    Gen: Alert, pleasant, NAD  COR: S1,S2, no murmur, no ectopy  Lungs: CTA bilaterally, no rhonchi, wheezes or rales  Ext: no peripheral edema, pulses full  Right foot: point tenderness along 5th metatarsal, mid shaft, no associated redness, swelling or warmth    Answers submitted by the patient for this visit:  Patient Health Questionnaire (Submitted on 12/9/2024)  If you checked off any problems, how difficult have these problems made it for you to do your work, take care of things at home, or get along with other people?: Somewhat difficult  PHQ9 TOTAL SCORE: 5  Patient Health Questionnaire (G7) (Submitted on 12/9/2024)  NAVYA 7 TOTAL SCORE: 8

## 2024-12-10 NOTE — NURSING NOTE
44 year old    Chief Complaint   Patient presents with     Follow Up     Was diagnosed with ADHD earlier this fall    Foot Pain     Right foot, has been a couple of months    Hypotension     Orthostatic hypotension has gotten worse in the past three months         Blood pressure 124/76, pulse 86, temperature 97.3  F (36.3  C), temperature source Skin, resp. rate 16, weight 92.4 kg (203 lb 12.8 oz), SpO2 99%. Body mass index is 26.16 kg/m .    Patient Active Problem List   Diagnosis    Benign neoplasm of skin    History of thyroid disease    Ocular migraine - Both Eyes    Major Depression    Generalized anxiety disorder    Benign prostatic hyperplasia with weak urinary stream    Skin cancer screening    Verruca vulgaris    Seborrheic keratosis    Multiple melanocytic nevi    Hyperlipidemia    Irritable bowel syndrome with both constipation and diarrhea    Pelvic floor dysfunction    Urinary hesitancy          Wt Readings from Last 2 Encounters:   12/10/24 92.4 kg (203 lb 12.8 oz)   05/15/24 92.1 kg (203 lb)       BP Readings from Last 3 Encounters:   12/10/24 124/76   08/23/24 117/79   05/15/24 135/88             Current Outpatient Medications   Medication Sig Dispense Refill    alfuzosin ER (UROXATRAL) 10 MG 24 hr tablet Take 1 tablet (10 mg) by mouth daily. 30 tablet 0    FLUoxetine (PROZAC) 10 MG capsule Take 1 capsule (10 mg) by mouth daily. 30 capsule 0    ibuprofen (ADVIL/MOTRIN) 200 MG tablet Take 200 mg by mouth every 4 hours as needed for mild pain      Probiotic Product (PROBIOTIC-10 PO)       psyllium (METAMUCIL/KONSYL) capsule        No current facility-administered medications for this visit.          Social History     Tobacco Use    Smoking status: Never    Smokeless tobacco: Never   Vaping Use    Vaping status: Never Used   Substance Use Topics    Alcohol use: Yes     Alcohol/week: 4.0 - 5.0 standard drinks of alcohol     Types: 4 - 5 Standard drinks or equivalent per week     Comment: 4-5 days per  "week one beer or glass of wine    Drug use: No          Health Maintenance Due   Topic Date Due    ADVANCE CARE PLANNING  Never done    YEARLY PREVENTIVE VISIT  03/01/2024    DTAP/TDAP/TD IMMUNIZATION (7 - Td or Tdap) 07/09/2024    INFLUENZA VACCINE (1) 09/01/2024    COVID-19 Vaccine (6 - 2024-25 season) 09/01/2024        No results found for: \"PAP\"        December 10, 2024 10:19 AM        "

## 2024-12-10 NOTE — NURSING NOTE
Prior to immunization administration, verified patients identity using patient s name and date of birth. Please see Immunization Activity for additional information.     Screening Questionnaire for Adult Immunization    Are you sick today?   No   Do you have allergies to medications, food, a vaccine component or latex?   No   Have you ever had a serious reaction after receiving a vaccination?   No   Do you have a long-term health problem with heart, lung, kidney, or metabolic disease (e.g., diabetes), asthma, a blood disorder, no spleen, complement component deficiency, a cochlear implant, or a spinal fluid leak?  Are you on long-term aspirin therapy?   No   Do you have cancer, leukemia, HIV/AIDS, or any other immune system problem?   No   Do you have a parent, brother, or sister with an immune system problem?   No   In the past 3 months, have you taken medications that affect  your immune system, such as prednisone, other steroids, or anticancer drugs; drugs for the treatment of rheumatoid arthritis, Crohn s disease, or psoriasis; or have you had radiation treatments?   No   Have you had a seizure, or a brain or other nervous system problem?   No   During the past year, have you received a transfusion of blood or blood    products, or been given immune (gamma) globulin or antiviral drug?   No   For women: Are you pregnant or is there a chance you could become       pregnant during the next month?   No   Have you received any vaccinations in the past 4 weeks?   No     Immunization questionnaire answers were all negative.      Patient instructed to remain in clinic for 15 minutes afterwards, and to report any adverse reactions.     Screening performed by Irma Gaines LPN on 12/10/2024 at 11:09 AM.

## 2024-12-31 NOTE — TELEPHONE ENCOUNTER
MEDICAL RECORDS REQUEST   [facility]  Urology Clinic  [address] [place] [address]  PHONE: [phone]  Fax: [phone]        FUTURE VISIT INFORMATION                                                   Asa Mann, : 1980 scheduled for future visit at [facility]    APPOINTMENT INFORMATION:  Date: 02/10/2025  Provider:  Gautam Bedolla MD  Reason for Visit/Diagnosis: Benign prostatic hyperplasia with weak urinary stream    REFERRAL INFORMATION:  Referring provider:  Rita Rios MD in [facility] PRIMARY [facility]      RECORDS REQUESTED FOR VISIT                                                     NOTES  STATUS/DETAILS   OFFICE NOTE from referring provider  yes, 12/10/2024 -- Rita Rios MD in [facility]   OFFICE NOTE from other specialist  yes   MEDICATION LIST  yes   LABS     URINALYSIS (UA)  yes   IMAGES  yes, 2024 -- MR ABD  2024 -- US ABD     PRE-VISIT CHECKLIST      Joint diagnostic appointment coordinated correctly          (ensure right order & amount of time) Yes   RECORD COLLECTION COMPLETE Yes

## 2025-02-10 ENCOUNTER — LAB (OUTPATIENT)
Dept: LAB | Facility: CLINIC | Age: 45
End: 2025-02-10
Payer: COMMERCIAL

## 2025-02-10 ENCOUNTER — OFFICE VISIT (OUTPATIENT)
Dept: UROLOGY | Facility: CLINIC | Age: 45
End: 2025-02-10
Attending: INTERNAL MEDICINE
Payer: COMMERCIAL

## 2025-02-10 ENCOUNTER — PRE VISIT (OUTPATIENT)
Dept: UROLOGY | Facility: CLINIC | Age: 45
End: 2025-02-10

## 2025-02-10 VITALS — OXYGEN SATURATION: 98 % | DIASTOLIC BLOOD PRESSURE: 80 MMHG | HEART RATE: 87 BPM | SYSTOLIC BLOOD PRESSURE: 124 MMHG

## 2025-02-10 DIAGNOSIS — N40.1 BENIGN PROSTATIC HYPERPLASIA WITH WEAK URINARY STREAM: ICD-10-CM

## 2025-02-10 DIAGNOSIS — R39.12 BENIGN PROSTATIC HYPERPLASIA WITH WEAK URINARY STREAM: ICD-10-CM

## 2025-02-10 DIAGNOSIS — R39.9 LOWER URINARY TRACT SYMPTOMS (LUTS): Primary | ICD-10-CM

## 2025-02-10 DIAGNOSIS — R39.9 LOWER URINARY TRACT SYMPTOMS (LUTS): ICD-10-CM

## 2025-02-10 LAB — PSA SERPL DL<=0.01 NG/ML-MCNC: 0.61 NG/ML (ref 0–2.5)

## 2025-02-10 PROCEDURE — 84153 ASSAY OF PSA TOTAL: CPT

## 2025-02-10 ASSESSMENT — PAIN SCALES - GENERAL: PAINLEVEL_OUTOF10: NO PAIN (0)

## 2025-02-10 NOTE — NURSING NOTE
Chief Complaint   Patient presents with    Benign Prostatic Hypertrophy     New patient: Benign prostatic hyperplasia with weak urinary stream      Relevant information: Referred by [facility] PCP on 12/10/24:      currently on max dose of medication, still with some dampened flow    Plan: alfuzosin ER (UROXATRAL) 10 MG 24 hr tablet,  Adult Urology  Referral        Refilled med but referred to urologist for follow up given progression of symptoms.        Post-Void Residual US:   US performed at bedside with patient laying supine on exam table   Residual urine: 11 mls   Performed with patient consent.   PVR education provided      Tests performed as requested by Gautam Bedolla MD       This writer escorted the patient to scheduling. Patient verbalized understanding of check out instructions. This patient declined printed after visit summary today. The patient states they will view the AVS via [employer].         Blood pressure 124/80, pulse 87, SpO2 98%. There is no height or weight on file to calculate BMI.    Patient Active Problem List   Diagnosis    Benign neoplasm of skin    History of thyroid disease    Ocular migraine - Both Eyes    Major Depression    Generalized anxiety disorder    Benign prostatic hyperplasia with weak urinary stream    Skin cancer screening    Verruca vulgaris    Seborrheic keratosis    Multiple melanocytic nevi    Hyperlipidemia    Irritable bowel syndrome with both constipation and diarrhea    Pelvic floor dysfunction    Urinary hesitancy       Allergies   Allergen Reactions    Amoxicillin Other (See Comments)     Tops of legs turned red, hot and itchy    Seasonal Allergies     Zithromax [Azithromycin Dihydrate] Diarrhea    Penicillins Rash       Current Outpatient Medications   Medication Sig Dispense Refill    alfuzosin ER (UROXATRAL) 10 MG 24 hr tablet Take 1 tablet (10 mg) by mouth daily. 90 tablet 3    FLUoxetine (PROZAC) 10 MG capsule Take 1 capsule (10 mg) by mouth daily. 90  capsule 3    ibuprofen (ADVIL/MOTRIN) 200 MG tablet Take 200 mg by mouth every 4 hours as needed for mild pain      Probiotic Product (PROBIOTIC-10 PO)       psyllium (METAMUCIL/KONSYL) capsule          Social History     Tobacco Use    Smoking status: Never    Smokeless tobacco: Never   Vaping Use    Vaping status: Never Used   Substance Use Topics    Alcohol use: Yes     Alcohol/week: 4.0 - 5.0 standard drinks of alcohol     Types: 4 - 5 Standard drinks or equivalent per week     Comment: 4-5 days per week one beer or glass of wine    Drug use: No       Mar Landry  2/10/2025  9:07 AM

## 2025-02-10 NOTE — LETTER
2/10/2025       RE: Asa Mann  [address] [facility] [address]     Dear Colleague,    Thank you for referring your patient, Asa Mann, to the [facility] [place] at [facility]. Please see a copy of my visit note below.    CC: LUTS    HPI: 43 yo male with LUTS managed with alfuzosin since at least 2020.  Does have hesitation and a slow stream.  Needs to wait extra time to make sure he is empty.  Does have increased pelvic floor muscle tone and has done pelvic floor relaxation exercises for constipation and urination which did help, but is not doing them now due to a new child.  He notes that while his symtoms are somewhat annoying, they are not      PMHx: anxiety, pelvic floor dysfunction, irritable bowel, depression, ocular migraine, thyroid disease  PSHx: appy  MEDS: alfuzosin, fluoxetine, ibuprofen, probiotic, metamucil  ALLERGY: PCN, amoxicillin, azithromycin  FHx: neg CaP, father has BPH  SocHX: neg tobac, ETOH 1 beer every other day  ROS: neg for f/c/;s,, N/V, wt changes    OBJECTIVE:  PSA 6/3/15 0.56 ng/mL    PVR: 11 cc    ASSESSMENT AND PLAN: 41-minutes were spent today reviewing the chart, interpreting results and counseling the patient regarding his LUTS.  Patient has a diagnosis of pelvic floor dysfunction and this remains the most likely diagnosis.  We will refer him back to PT for pelvic floor relaxation exercises again.  We will check a PSA as well.  The patient can otherwise come back in 2 years and see one of our PAs.        Again, thank you for allowing me to participate in the care of your patient.      Sincerely,    Gautam Bedolla MD

## 2025-02-10 NOTE — PROGRESS NOTES
CC: LUTS    HPI: 45 yo male with LUTS managed with alfuzosin since at least 2020.  Does have hesitation and a slow stream.  Needs to wait extra time to make sure he is empty.  Does have increased pelvic floor muscle tone and has done pelvic floor relaxation exercises for constipation and urination which did help, but is not doing them now due to a new child.  He notes that while his symtoms are somewhat annoying, they are not      PMHx: anxiety, pelvic floor dysfunction, irritable bowel, depression, ocular migraine, thyroid disease  PSHx: appy  MEDS: alfuzosin, fluoxetine, ibuprofen, probiotic, metamucil  ALLERGY: PCN, amoxicillin, azithromycin  FHx: neg CaP, father has BPH  SocHX: neg tobac, ETOH 1 beer every other day  ROS: neg for f/c/;s,, N/V, wt changes    OBJECTIVE:  PSA 6/3/15 0.56 ng/mL    PVR: 11 cc    ASSESSMENT AND PLAN: 41-minutes were spent today reviewing the chart, interpreting results and counseling the patient regarding his LUTS.  Patient has a diagnosis of pelvic floor dysfunction and this remains the most likely diagnosis.  We will refer him back to PT for pelvic floor relaxation exercises again.  We will check a PSA as well.  The patient can otherwise come back in 2 years and see one of our PAs.      Addendum: PSA came back today after the visit at 0.61 ng/mL.

## 2025-04-02 ENCOUNTER — MYC MEDICAL ADVICE (OUTPATIENT)
Dept: FAMILY MEDICINE | Facility: CLINIC | Age: 45
End: 2025-04-02

## 2025-04-07 ENCOUNTER — OFFICE VISIT (OUTPATIENT)
Dept: SURGERY | Facility: CLINIC | Age: 45
End: 2025-04-07
Payer: COMMERCIAL

## 2025-04-07 ENCOUNTER — TELEPHONE (OUTPATIENT)
Dept: SURGERY | Facility: CLINIC | Age: 45
End: 2025-04-07

## 2025-04-07 VITALS
HEIGHT: 74 IN | HEART RATE: 91 BPM | RESPIRATION RATE: 16 BRPM | SYSTOLIC BLOOD PRESSURE: 131 MMHG | BODY MASS INDEX: 25.53 KG/M2 | DIASTOLIC BLOOD PRESSURE: 53 MMHG | OXYGEN SATURATION: 98 % | WEIGHT: 198.9 LBS

## 2025-04-07 DIAGNOSIS — R21 PERIANAL RASH: Primary | ICD-10-CM

## 2025-04-07 RX ORDER — KETOCONAZOLE 20 MG/G
CREAM TOPICAL DAILY
Qty: 30 G | Refills: 0 | Status: SHIPPED | OUTPATIENT
Start: 2025-04-07

## 2025-04-07 ASSESSMENT — PAIN SCALES - GENERAL: PAINLEVEL_OUTOF10: NO PAIN (0)

## 2025-04-07 NOTE — LETTER
"2025      Asa Mann  [address]  [place] [facility] [address]      Dear Colleague,    Thank you for referring your patient, Asa Mann, to the [facility]. Please see a copy of my visit note below.    Colon and Rectal Surgery Follow-Up Clinic Note    Patient: Asa Mann  YOB: 1980  Date of Visit: 2025    Asa Mann is a very pleasant 45 year old male here for follow-up of perianal irritation.    Interval history: I saw Asa in 2024 for intermittent perianal irritation with associated blood with wiping. His symptoms were improving by the time of his appointment, and his exam was fairly benign. There was some minor irritation anteriorly but the perianal skin was intact. I recommended Calmoseptine and Calmol-4 as needed.    About 2 weeks ago, he started having another flare of the perianal irritation. This started after he had diarrhea and had to wipe a lot. He did have some blood on the toilet paper by the time he finished wiping. He took photos and sent them on [employer] after about a week of having the irritation. He noted a lot of redness around the anterior perianal area. It feels raw to the touch. He tried applying Calmoseptine but this did not seem to do much. He later tried an antifungal since the area was also itchy. His symptoms worsened to the point that he went to the [facility] earlier today. It also seems to have spread posteriorly into the  cleft, but this is more itchy than raw. He only had diarrhea for that one day and now his BMs are back to baseline.     He does not have anal receptive sex.    [employer] photo sent 25 (top of photo is anterior)      Physical Examination:   /53   Pulse 91   Resp 16   Ht 1.88 m (6' 2\")   Wt 90.2 kg (198 lb 14.4 oz)   SpO2 98%   BMI 25.54 kg/m    General: alert, oriented, in no acute distress, sitting comfortably  Respiratory: non-labored breathing on " RA  Chaperone: Penelope Peguero LPN   Perianal External Examination:  Erythematous lesion/rash anteriorly and posteriorly extending towards the edin cleft. No drainage or discharge. The skin is intact except for 2-3 very tiny spots within the anterior portion of erythema. The anterior portion has a fairly well-demarcated border of erythema that is not raised. The posterior portion's borders are not well-demarcated and there are a few small erythematous satellite lesions around the posterior portion. There is also a ring of scaly/flaky skin around the entire rash.       Digital rectal examination: Was deferred.    Anoscopy: Was deferred.    Procedure: Biopsy  After discussing the risks and benefits, the patient agreed to proceed with biopsy.    Prior to the start of the procedure and with procedural staff participation, I verbally confirmed the patient s identity using two indicators, relevant allergies, that the procedure was appropriate and matched the consent or emergent situation, and that the correct equipment/implants were available. Immediately prior to starting the procedure I conducted the Time Out with the procedural staff and re-confirmed the patient s name, procedure, and site/side. (The Joint Commission universal protocol was followed.)  Yes    Sedation (Moderate or Deep): None    The skin was cleansed with povidone-iodine solution and injected with 5 ml of 1% lidocaine with epinephrine with good anesthetic effect. A biopsy of the posterior portion of the perianal rash was obtained using a  3 mm punch biopsy and sent for permanent pathology. There was minimal bleeding from the biopsy site. A dry 4x4 gauze dressing applied between the buttocks. Patient tolerated the procedure well.     Procedure was performed under collaborating agreement with Dr. Juan Francisco Garcia MD, Chief of the Division of Colon and Rectal Surgery    Assessment/Plan: 45 year old male with recurrent perianal rash, currently in a flare  that has been worsening for the last 2 weeks. Discussed work up today. Will start with G/C, HSV, and punch biopsy.   He asked about bacterial swabs, which I would not recommend given natural skin duy that would cause an inevitable positive culture. I discussed with him that the appearance of the rash does not appear bacterial and I would not recommend aerobic/anaerobic cultures.   He does not have high risk features for STDs but requested the G/C and HSV for thorough work up. I also discussed with him that the rash does not appear consistent with gonorrhea or chlamydia. Perianal herpes can have an atypical appearance, but he does not have any vesicles or herpetic lesions. I discussed with him that I expect these to be negative.  Discussed that I would favor a punch biopsy. Fungal infections can be noted on the skin. With the recurrent nature of this rash, I would also question if this is a dermatologic condition such as psoriasis.     He was agreeable to the above work up. We discussed starting a topical antifungal for 1 week and see how that progresses. If his symptoms do not improve, then will add in a topical steroid. Also advised increasing his Metamucil to 2 tbsp daily. He will reach out to me on MyChart to check in next week. I will reach out to him with test results. Contact us in the meantime with questions or concerns. Patient's questions were answered to his stated satisfaction and he is in agreement with this plan.       Past Medical History:   Diagnosis Date     Anxiety      Hyperlipidemia      Snoring      Past Surgical History:   Procedure Laterality Date     APPENDECTOMY  2009     Current Outpatient Medications   Medication Sig Dispense Refill     alfuzosin ER (UROXATRAL) 10 MG 24 hr tablet Take 1 tablet (10 mg) by mouth daily. 90 tablet 3     FLUoxetine (PROZAC) 10 MG capsule Take 1 capsule (10 mg) by mouth daily. 90 capsule 3     ibuprofen (ADVIL/MOTRIN) 200 MG tablet Take 200 mg by mouth every 4  hours as needed for mild pain       ketoconazole (NIZORAL) 2 % external cream Apply topically daily. 30 g 0     Probiotic Product (PROBIOTIC-10 PO)        psyllium (METAMUCIL/KONSYL) capsule        Allergies   Allergen Reactions     Amoxicillin Other (See Comments)     Tops of legs turned red, hot and itchy     Seasonal Allergies      Zithromax [Azithromycin Dihydrate] Diarrhea     Penicillins Rash     Family History   Problem Relation Age of Onset     Glaucoma Mother      EYE* Mother         retinal bleed     Other - See Comments Mother 35        Lymes     Diabetes Father      Thyroid Disease Father         multinodular goiter     Lymphoma Father      Stomach Cancer Father 45     Hyperlipidemia Father      Thyroid Disease Sister         hashimoto     Hyperlipidemia Brother 47     Glaucoma Maternal Grandmother      Pancreatic Cancer Maternal Grandfather      Macular Degeneration Paternal Grandmother      Heart Failure Paternal Grandmother 90     Aortic aneurysm Paternal Grandfather      Alcoholism Paternal Grandfather      Pancreatic Cancer Maternal Uncle      Thyroid Disease Paternal Aunt      Bladder Cancer Paternal Uncle      Heart Failure Paternal Uncle 80     Glaucoma Other         maternal uncles     EYE* Other         ret bleed/ mat uncle     Social History     Tobacco Use     Smoking status: Never     Smokeless tobacco: Never   Substance Use Topics     Alcohol use: Yes     Alcohol/week: 4.0 - 5.0 standard drinks of alcohol     Types: 4 - 5 Standard drinks or equivalent per week     Comment: 4-5 days per week one beer or glass of wine     Marital status: .      Princess Seymour PA-C  Colon and Rectal Surgery  [facility]      I spent a total of 45 minutes on the day of the visit.  Time spent on date of encounter doing chart review, history and exam, documentation, and further activities in this note. An additional 5 minutes was spent for biopsy.      Again, thank you for allowing  me to participate in the care of your patient.        Sincerely,        Princess Seymour PA-C    Electronically signed

## 2025-04-07 NOTE — NURSING NOTE
"Chief Complaint   Patient presents with    RECHECK     Perianal irritation.       Vitals:    04/07/25 1433   BP: 131/53   Pulse: 91   Resp: 16   SpO2: 98%   Weight: 90.2 kg (198 lb 14.4 oz)   Height: 1.88 m (6' 2\")       Body mass index is 25.54 kg/m .     ROBERT Peguero LPN  "

## 2025-04-07 NOTE — TELEPHONE ENCOUNTER
[facility]    Phone Message    May a detailed message be left on voicemail: yes     Reason for Call: Other: Asa is calling in asking for a call back from his care team to discuss his ongoing symptoms and how to manage this prior to his appt on 4/9. Please call back as soon as possible to discuss.     Action Taken: Message routed to:  [facility] ([facility]    Travel Screening: Not Applicable     Date of Service:

## 2025-04-07 NOTE — PROGRESS NOTES
"Colon and Rectal Surgery Follow-Up Clinic Note    Patient: Asa Mann  YOB: 1980  Date of Visit: 2025    Asa Mann is a very pleasant 45 year old male here for follow-up of perianal irritation.    Interval history: I saw Asa in 2024 for intermittent perianal irritation with associated blood with wiping. His symptoms were improving by the time of his appointment, and his exam was fairly benign. There was some minor irritation anteriorly but the perianal skin was intact. I recommended Calmoseptine and Calmol-4 as needed.    About 2 weeks ago, he started having another flare of the perianal irritation. This started after he had diarrhea and had to wipe a lot. He did have some blood on the toilet paper by the time he finished wiping. He took photos and sent them on [employer] after about a week of having the irritation. He noted a lot of redness around the anterior perianal area. It feels raw to the touch. He tried applying Calmoseptine but this did not seem to do much. He later tried an antifungal since the area was also itchy. His symptoms worsened to the point that he went to the [facility] earlier today. It also seems to have spread posteriorly into the edin cleft, but this is more itchy than raw. He only had diarrhea for that one day and now his BMs are back to baseline.     He does not have anal receptive sex.    [employer] photo sent 25 (top of photo is anterior)      Physical Examination:   /53   Pulse 91   Resp 16   Ht 1.88 m (6' 2\")   Wt 90.2 kg (198 lb 14.4 oz)   SpO2 98%   BMI 25.54 kg/m    General: alert, oriented, in no acute distress, sitting comfortably  Respiratory: non-labored breathing on RA  Chaperone: Penelope Peguero LPN   Perianal External Examination:  Erythematous lesion/rash anteriorly and posteriorly extending towards the  cleft. No drainage or discharge. The skin is intact except for 2-3 very tiny spots within the anterior portion of " erythema. The anterior portion has a fairly well-demarcated border of erythema that is not raised. The posterior portion's borders are not well-demarcated and there are a few small erythematous satellite lesions around the posterior portion. There is also a ring of scaly/flaky skin around the entire rash.       Digital rectal examination: Was deferred.    Anoscopy: Was deferred.    Procedure: Biopsy  After discussing the risks and benefits, the patient agreed to proceed with biopsy.    Prior to the start of the procedure and with procedural staff participation, I verbally confirmed the patient s identity using two indicators, relevant allergies, that the procedure was appropriate and matched the consent or emergent situation, and that the correct equipment/implants were available. Immediately prior to starting the procedure I conducted the Time Out with the procedural staff and re-confirmed the patient s name, procedure, and site/side. (The Joint Commission universal protocol was followed.)  Yes    Sedation (Moderate or Deep): None    The skin was cleansed with povidone-iodine solution and injected with 5 ml of 1% lidocaine with epinephrine with good anesthetic effect. A biopsy of the posterior portion of the perianal rash was obtained using a  3 mm punch biopsy and sent for permanent pathology. There was minimal bleeding from the biopsy site. A dry 4x4 gauze dressing applied between the buttocks. Patient tolerated the procedure well.     Procedure was performed under collaborating agreement with Dr. Juan Francisco Garcia MD, Chief of the Division of Colon and Rectal Surgery    Assessment/Plan: 45 year old male with recurrent perianal rash, currently in a flare that has been worsening for the last 2 weeks. Discussed work up today. Will start with G/C, HSV, and punch biopsy.   He asked about bacterial swabs, which I would not recommend given natural skin duy that would cause an inevitable positive culture. I discussed  with him that the appearance of the rash does not appear bacterial and I would not recommend aerobic/anaerobic cultures.   He does not have high risk features for STDs but requested the G/C and HSV for thorough work up. I also discussed with him that the rash does not appear consistent with gonorrhea or chlamydia. Perianal herpes can have an atypical appearance, but he does not have any vesicles or herpetic lesions. I discussed with him that I expect these to be negative.  Discussed that I would favor a punch biopsy. Fungal infections can be noted on the skin. With the recurrent nature of this rash, I would also question if this is a dermatologic condition such as psoriasis.     He was agreeable to the above work up. We discussed starting a topical antifungal for 1 week and see how that progresses. If his symptoms do not improve, then will add in a topical steroid. Also advised increasing his Metamucil to 2 tbsp daily. He will reach out to me on MyChart to check in next week. I will reach out to him with test results. Contact us in the meantime with questions or concerns. Patient's questions were answered to his stated satisfaction and he is in agreement with this plan.       Past Medical History:   Diagnosis Date    Anxiety     Hyperlipidemia     Snoring      Past Surgical History:   Procedure Laterality Date    APPENDECTOMY  2009     Current Outpatient Medications   Medication Sig Dispense Refill    alfuzosin ER (UROXATRAL) 10 MG 24 hr tablet Take 1 tablet (10 mg) by mouth daily. 90 tablet 3    FLUoxetine (PROZAC) 10 MG capsule Take 1 capsule (10 mg) by mouth daily. 90 capsule 3    ibuprofen (ADVIL/MOTRIN) 200 MG tablet Take 200 mg by mouth every 4 hours as needed for mild pain      ketoconazole (NIZORAL) 2 % external cream Apply topically daily. 30 g 0    Probiotic Product (PROBIOTIC-10 PO)       psyllium (METAMUCIL/KONSYL) capsule        Allergies   Allergen Reactions    Amoxicillin Other (See Comments)     Tops  of legs turned red, hot and itchy    Seasonal Allergies     Zithromax [Azithromycin Dihydrate] Diarrhea    Penicillins Rash     Family History   Problem Relation Age of Onset    Glaucoma Mother     EYE* Mother         retinal bleed    Other - See Comments Mother 35        Lymes    Diabetes Father     Thyroid Disease Father         multinodular goiter    Lymphoma Father     Stomach Cancer Father 45    Hyperlipidemia Father     Thyroid Disease Sister         hashimoto    Hyperlipidemia Brother 47    Glaucoma Maternal Grandmother     Pancreatic Cancer Maternal Grandfather     Macular Degeneration Paternal Grandmother     Heart Failure Paternal Grandmother 90    Aortic aneurysm Paternal Grandfather     Alcoholism Paternal Grandfather     Pancreatic Cancer Maternal Uncle     Thyroid Disease Paternal Aunt     Bladder Cancer Paternal Uncle     Heart Failure Paternal Uncle 80    Glaucoma Other         maternal uncles    EYE* Other         ret bleed/ mat uncle     Social History     Tobacco Use    Smoking status: Never    Smokeless tobacco: Never   Substance Use Topics    Alcohol use: Yes     Alcohol/week: 4.0 - 5.0 standard drinks of alcohol     Types: 4 - 5 Standard drinks or equivalent per week     Comment: 4-5 days per week one beer or glass of wine     Marital status: .      Princess Seymour PA-C  Colon and Rectal Surgery  [facility]      I spent a total of 45 minutes on the day of the visit.  Time spent on date of encounter doing chart review, history and exam, documentation, and further activities in this note. An additional 5 minutes was spent for biopsy.

## 2025-04-08 LAB
C TRACH DNA SPEC QL PROBE+SIG AMP: NEGATIVE
HSV1 DNA SPEC QL NAA+PROBE: NOT DETECTED
HSV2 DNA SPEC QL NAA+PROBE: NOT DETECTED
N GONORRHOEA DNA SPEC QL NAA+PROBE: NEGATIVE
SPECIMEN TYPE: NORMAL
SPECIMEN TYPE: NORMAL

## 2025-04-10 ENCOUNTER — TELEPHONE (OUTPATIENT)
Dept: FAMILY MEDICINE | Facility: CLINIC | Age: 45
End: 2025-04-10

## 2025-04-10 ENCOUNTER — OFFICE VISIT (OUTPATIENT)
Dept: FAMILY MEDICINE | Facility: CLINIC | Age: 45
End: 2025-04-10
Payer: COMMERCIAL

## 2025-04-10 VITALS
BODY MASS INDEX: 25.04 KG/M2 | RESPIRATION RATE: 15 BRPM | WEIGHT: 195 LBS | DIASTOLIC BLOOD PRESSURE: 80 MMHG | TEMPERATURE: 97.4 F | SYSTOLIC BLOOD PRESSURE: 133 MMHG | OXYGEN SATURATION: 99 % | HEART RATE: 91 BPM

## 2025-04-10 DIAGNOSIS — K62.89 RECTAL IRRITATION: Primary | ICD-10-CM

## 2025-04-10 DIAGNOSIS — Z23 NEED FOR TDAP VACCINATION: ICD-10-CM

## 2025-04-10 DIAGNOSIS — M79.671 RIGHT FOOT PAIN: ICD-10-CM

## 2025-04-10 RX ORDER — KETOCONAZOLE 2 G/100G
AEROSOL, FOAM TOPICAL
COMMUNITY
Start: 2025-04-10 | End: 2025-04-10

## 2025-04-10 NOTE — PROGRESS NOTES
[facility] [address], [address]  [facility] [address]  Phone: [phone]  Fax: [phone]    Patient:  Asa Mann 1980  Date of Visit:  2:27 PM  Referring Provider Referred Self      Assessment & Plan    (K62.89) Rectal irritation  (primary encounter diagnosis)  Comment: slow improvement with topical ketoconazole cream, biopsy still pending.   Plan: continue with treatment plan    (M79.176) Right foot pain  Comment: last discussed right fifth metatarsal pain at the December 2024 visit.  I suspected this was a stress fracture and recommended he wear a cam boot.  He has worn the boot only intermittently but notes improvement, pain is not always present.  Plan: Continue with efforts to wear the boot, NSAIDs, supportive shoes    (Z23) Need for Tdap vaccination  Comment: Routine vaccine is due  Plan: Given      Rita Rios MD       Asa Mann is a 45 year old male here for the following issues:    Rectal irritation  History of intermittent perianal irritation.  Recent flare with evaluation at the colorectal clinic.  Was using Calmoseptine without much relief  Have localized redness and irritation as well as itching.  He was seen in urgency room who prescribed antifungal cream  Colorectal team checked for GC chlamydia and HSV, all negative.  Local biopsy was taken and is still pending.  Rich reports that symptoms are gradually improving with the use of topical ketoconazole    Right foot pain  Last evaluated 12/2024, hx of stress fracture 2011 after doing a lot of walking  He described 2-month history of pain at 5th metatarsal and plantar surface of his right foot   No recall of injury, worse with walking.   I recommended he wear a cam walker  Today  Reports that he has not been very compliant with use of the cam walker.    He lives in house with steep staircase, unable to wear the boot full time  Not 100% better but slowly  improving.    No pain today    Medial right knee pain  Burning sensation x 2 mos  None for the past 2 wks  No injury, no swelling      Diet: gluten free since January 2025  Down 8 lb  No change in exercise  No sweats or fevers  Wt Readings from Last 4 Encounters:   04/10/25 88.5 kg (195 lb)   04/07/25 90.2 kg (198 lb 14.4 oz)   12/10/24 92.4 kg (203 lb 12.8 oz)   05/15/24 92.1 kg (203 lb)     Irritable bowel  Alternated constipation and soft messy stool  Going gluten free has helped    Patient Active Problem List   Diagnosis    Benign neoplasm of skin    History of thyroid disease    Ocular migraine - Both Eyes    Major Depression    Generalized anxiety disorder    Benign prostatic hyperplasia with weak urinary stream    Skin cancer screening    Verruca vulgaris    Seborrheic keratosis    Multiple melanocytic nevi    Hyperlipidemia    Irritable bowel syndrome with both constipation and diarrhea    Pelvic floor dysfunction    Urinary hesitancy       Current Outpatient Medications   Medication Sig Dispense Refill    alfuzosin ER (UROXATRAL) 10 MG 24 hr tablet Take 1 tablet (10 mg) by mouth daily. 90 tablet 3    FLUoxetine (PROZAC) 10 MG capsule Take 1 capsule (10 mg) by mouth daily. 90 capsule 3    ibuprofen (ADVIL/MOTRIN) 200 MG tablet Take 200 mg by mouth every 4 hours as needed for mild pain      ketoconazole (NIZORAL) 2 % external cream Apply topically daily. 30 g 0    Probiotic Product (PROBIOTIC-10 PO)       psyllium (METAMUCIL/KONSYL) capsule          Allergies   Allergen Reactions    Amoxicillin Other (See Comments)     Tops of legs turned red, hot and itchy    Seasonal Allergies     Zithromax [Azithromycin Dihydrate] Diarrhea    Penicillins Rash        EXAM  /80 (BP Location: Left arm, Patient Position: Sitting, Cuff Size: Adult Regular)   Pulse 91   Temp 97.4  F (36.3  C) (Skin)   Resp 15   Wt 88.5 kg (195 lb)   SpO2 99%   BMI 25.04 kg/m    Gen: Alert, pleasant, NAD  Right foot: No tenderness  with palpation over the fifth metatarsal or plantar surface.  Right knee: No tenderness with palpation over any of the bony landmarks.  He points to the medial inferior aspect of his knee as the spot where he had the burning sensation

## 2025-04-10 NOTE — NURSING NOTE
45 year old    Chief Complaint   Patient presents with    Follow Up     On going fissure, did a biopsy  Right foot         Blood pressure 133/80, pulse 91, temperature 97.4  F (36.3  C), temperature source Skin, resp. rate 15, weight 88.5 kg (195 lb), SpO2 99%. Body mass index is 25.04 kg/m .    Patient Active Problem List   Diagnosis    Benign neoplasm of skin    History of thyroid disease    Ocular migraine - Both Eyes    Major Depression    Generalized anxiety disorder    Benign prostatic hyperplasia with weak urinary stream    Skin cancer screening    Verruca vulgaris    Seborrheic keratosis    Multiple melanocytic nevi    Hyperlipidemia    Irritable bowel syndrome with both constipation and diarrhea    Pelvic floor dysfunction    Urinary hesitancy          Wt Readings from Last 2 Encounters:   04/10/25 88.5 kg (195 lb)   04/07/25 90.2 kg (198 lb 14.4 oz)       BP Readings from Last 3 Encounters:   04/10/25 133/80   04/07/25 131/53   02/10/25 124/80             Current Outpatient Medications   Medication Sig Dispense Refill    alfuzosin ER (UROXATRAL) 10 MG 24 hr tablet Take 1 tablet (10 mg) by mouth daily. 90 tablet 3    FLUoxetine (PROZAC) 10 MG capsule Take 1 capsule (10 mg) by mouth daily. 90 capsule 3    ibuprofen (ADVIL/MOTRIN) 200 MG tablet Take 200 mg by mouth every 4 hours as needed for mild pain      ketoconazole (NIZORAL) 2 % external cream Apply topically daily. 30 g 0    Probiotic Product (PROBIOTIC-10 PO)       psyllium (METAMUCIL/KONSYL) capsule        No current facility-administered medications for this visit.          Social History     Tobacco Use    Smoking status: Never    Smokeless tobacco: Never   Vaping Use    Vaping status: Never Used   Substance Use Topics    Alcohol use: Yes     Alcohol/week: 4.0 - 5.0 standard drinks of alcohol     Types: 4 - 5 Standard drinks or equivalent per week     Comment: 4-5 days per week one beer or glass of wine    Drug use: No          Health Maintenance  "Due   Topic Date Due    ADVANCE CARE PLANNING  Never done    YEARLY PREVENTIVE VISIT  03/01/2024    DTAP/TDAP/TD IMMUNIZATION (7 - Td or Tdap) 07/09/2024    COVID-19 Vaccine (6 - 2024-25 season) 09/01/2024    LIPID  04/09/2025        No results found for: \"PAP\"        April 10, 2025 2:25 PM        "

## 2025-04-10 NOTE — TELEPHONE ENCOUNTER
Before leaving , patient wanted to confirm it should be     ketoconazole (NIZORAL) 2 % external cream       Not the     ketoconazole (EXTINA) 2 % external foam

## 2025-04-17 LAB
PATH REPORT.COMMENTS IMP SPEC: NORMAL
PATH REPORT.FINAL DX SPEC: NORMAL
PATH REPORT.GROSS SPEC: NORMAL
PATH REPORT.MICROSCOPIC SPEC OTHER STN: NORMAL
PATH REPORT.RELEVANT HX SPEC: NORMAL
PHOTO IMAGE: NORMAL

## 2025-04-19 ENCOUNTER — HEALTH MAINTENANCE LETTER (OUTPATIENT)
Age: 45
End: 2025-04-19

## 2025-04-22 ENCOUNTER — OFFICE VISIT (OUTPATIENT)
Dept: DERMATOLOGY | Facility: CLINIC | Age: 45
End: 2025-04-22
Payer: COMMERCIAL

## 2025-04-22 DIAGNOSIS — R21 PERIANAL RASH: Primary | ICD-10-CM

## 2025-04-22 PROCEDURE — 1126F AMNT PAIN NOTED NONE PRSNT: CPT | Performed by: STUDENT IN AN ORGANIZED HEALTH CARE EDUCATION/TRAINING PROGRAM

## 2025-04-22 PROCEDURE — 99214 OFFICE O/P EST MOD 30 MIN: CPT | Performed by: STUDENT IN AN ORGANIZED HEALTH CARE EDUCATION/TRAINING PROGRAM

## 2025-04-22 RX ORDER — TACROLIMUS 1 MG/G
OINTMENT TOPICAL 2 TIMES DAILY
Qty: 60 G | Refills: 4 | Status: SHIPPED | OUTPATIENT
Start: 2025-04-22

## 2025-04-22 ASSESSMENT — PAIN SCALES - GENERAL: PAINLEVEL_OUTOF10: NO PAIN (0)

## 2025-04-22 NOTE — PROGRESS NOTES
[facility] Dermatology Note  Encounter Date: Apr 22, 2025  Office Visit     Dermatology Problem List:  1. Perianal dysthesia with erythema   DDX: LS, LSC, intertrigo, irritant derm    ____________________________________________    Assessment & Plan:     # Perianal dysthesia with erythema   DDX: LS, LSC, intertrigo, irritant derm  - Denies pruritus or perianal itching in sleep.   - Likely irritant derm vs localized hypersensitivity  - Ketoconazole assists with improvement, okay to continue as could be an aspect of intertrigo and/or anti-inflammatory azole properties.  - Low likelihood of MF and/or lupus given location, duration, localization.  - Would start topical tacrolimus (reviewed warming sensation and ways to adapt to continued use and application. This can help with non-steroidal anti-inflammatory to avoid any steroid induced worsening of intertrigo. Thin layer QPM to BID.   - Continue Aquaphor ad iqra.  - Okay to use ketoconazole as desired.   - In the future, consider topical gabapentin compound for neuropathic aspect and use in perianal pruritus/hypersensitivity.  - Continued follow up with medication management.  - Reviewed that fungal testing is low yield as he is already on treatment. Bedside tests are not particularly sensitive. PAS was performed on tissue. There is the question of colonization vs pathogen, especially in genital skin.      Procedures Performed:   None  None    Reviewed most recent biopsy performed by colorectal; read by dermpath.    Follow-up: 2 month(s) in-person, or earlier for new or changing lesions    Staff:     Deepa Sanders MD PhD  Dermatology, Dermatopathology    ____________________________________________    CC: Derm Problem (Perianal rash on and off for 10 years)      HPI:  Asa MADELEINE Mann is a(n) 45 year old male who presents today as a return patient for evaluation of persistent and bothersome perianal irritation/sensation and rash.    Last saw   Reuben in 2023 for skin check and plantar wart.    Try hydrocortisone, then antifungal.  Thought it was Lichen sclerosis, considered biopsy but was getting on a plane.     Rash is never GONE. Saw PCP and colorectal surgery - treated with Zinc suppository/cream.    Approximately 3 weeks ago, it became the worse it's ever been. Worsened with Zinc cream. Urgent care was unsure if it was fungal. Sent to GI/colorectal where they discussed fungal vs irritant vs other.     He has been frustrated by the course of this chronic problem. Denies pruritus. Denies itch during sleep.  Looking for answers, firm diagnosis and treatment.   Would like additional fungal testing, blood testing.    Concerned it could be MF vs lupus.       Regimen today:  - Ketoconazole cream (BID) x 2 weeks.    Aquaphor works well.       Patient is otherwise feeling well, without additional skin concerns.     Labs Reviewed:    Lab Results   Component Value Date    WBC 5.0 04/09/2024    HGB 16.1 04/09/2024    HCT 45.5 04/09/2024     04/09/2024     04/09/2024    POTASSIUM 4.4 04/09/2024    CHLORIDE 104 04/09/2024    CO2 29 04/09/2024    BUN 11.8 04/09/2024    CR 1.02 04/09/2024     (H) 04/09/2024    AST 26 04/09/2024    ALT 54 04/09/2024    ALKPHOS 72 04/09/2024    BILITOTAL 0.5 04/09/2024    INR 1.05 04/09/2024        Physical Exam:  Vitals: There were no vitals taken for this visit.  SKIN: Focused examination of genital and perianal skin was performed. The medical assistant was present during the entirety of pre-exam history and exam.   - Perianal area with faint, ill defined pink erythema without superficial scale, trailing borders or desquamation.  - Well defined post-procedural punch biopsy, well healing with out induration or drainage.     The erythema extends from the posterior scrotum and surrounding the anus without discrete borders.  Area is otherwise clean/dry/intact.    - No other lesions of concern on areas examined.        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 Early features of lichen simplex chronicus, due to chronic frictional trauma, are seen. Clinical correlation is recommended.          Medications:  Current Outpatient Medications   Medication Sig Dispense Refill    alfuzosin ER (UROXATRAL) 10 MG 24 hr tablet Take 1 tablet (10 mg) by mouth daily. 90 tablet 3    FLUoxetine (PROZAC) 10 MG capsule Take 1 capsule (10 mg) by mouth daily. 90 capsule 3    ibuprofen (ADVIL/MOTRIN) 200 MG tablet Take 200 mg by mouth every 4 hours as needed for mild pain      ketoconazole (NIZORAL) 2 % external cream Apply topically daily. 30 g 0    Probiotic Product (PROBIOTIC-10 PO)       psyllium (METAMUCIL/KONSYL) capsule        No current facility-administered medications for this visit.      Past Medical History:   Patient Active Problem List   Diagnosis    Benign neoplasm of skin    History of thyroid disease    Ocular migraine - Both Eyes    Major Depression    Generalized anxiety disorder    Benign prostatic hyperplasia with weak urinary stream    Skin cancer screening    Verruca vulgaris    Seborrheic keratosis    Multiple melanocytic nevi    Hyperlipidemia    Irritable bowel syndrome with both constipation and diarrhea    Pelvic floor dysfunction    Urinary hesitancy       Past Medical History:   Diagnosis Date    Anxiety     Hyperlipidemia     Snoring        CC: Primary Care Provider: Rita Rios or Referring Provider: Princess Seymour

## 2025-04-22 NOTE — PATIENT INSTRUCTIONS
Apply tacrolimus twice daily.    Use as much Aquaphor as desires.       _______________________________________________________          Proper skin care from [facility]:    -Eliminate harsh soaps as they strip the natural oils from the skin, often resulting in dry itchy skin ( i.e. Dial, Zest, Cymro Spring)  -Use mild soaps such as Cetaphil or Dove Sensitive Skin in the shower. You do not need to use soap on arms, legs, and trunk every time you shower unless visibly soiled.   -Avoid hot or cold showers.  -After showering, lightly dry off and apply moisturizing within 2-3 minutes. This will help trap moisture in the skin.   -Aggressive use of a moisturizer at least 1-2 times a day to the entire body (including -Vanicream, Cetaphil, Aquaphor or Cerave) and moisturize hands after every washing.  -We recommend using moisturizers that come in a tub that needs to be scooped out, not a pump. This has more of an oil base. It will hold moisture in your skin much better than a water base moisturizer. The above recommended are non-pore clogging.      Wear a sunscreen with at least SPF 30 on your face, ears, neck and V of the chest daily. Wear sunscreen on other areas of the body if those areas are exposed to the sun throughout the day. Sunscreens can contain physical and/or chemical blockers. Physical blockers are less likely to clog pores, these include zinc oxide and titanium dioxide. Reapply every two hour and after swimming.     Sunscreen examples: https://www.ewg.org/sunscreen/    UV radiation  UVA radiation remains constant throughout the day and throughout the year. It is a longer wavelength than UVB and therefore penetrates deeper into the skin leading to immediate and delayed tanning, photoaging, and skin cancer. 70-80% of UVA and UVB radiation occurs between the hours of 10am-2pm.  UVB radiation  UVB radiation causes the most harmful effects and is more significant during the summer months. However, snow  and ice can reflect UVB radiation leading to skin damage during the winter months as well. UVB radiation is responsible for tanning, burning, inflammation, delayed erythema (pinkness), pigmentation (brown spots), and skin cancer.     I recommend self monthly full body exams and yearly full body exams with a dermatology provider. If you develop a new or changing lesion please follow up for examination. Most skin cancers are pink and scaly or pink and pearly. However, we do see blue/brown/black skin cancers.  Consider the ABCDEs of melanoma when giving yourself your monthly full body exam ( don't forget the groin, buttocks, feet, toes, etc). A-asymmetry, B-borders, C-color, D-diameter, E-elevation or evolving. If you see any of these changes please follow up in clinic. If you cannot see your back I recommend purchasing a hand held mirror to use with a larger wall mirror.       Checking for Skin Cancer  You can find cancer early by checking your skin each month. There are 3 kinds of skin cancer. They are melanoma, basal cell carcinoma, and squamous cell carcinoma. Doing monthly skin checks is the best way to find new marks or skin changes. Follow the instructions below for checking your skin.   The ABCDEs of checking moles for melanoma   Check your moles or growths for signs of melanoma using ABCDE:   Asymmetry: the sides of the mole or growth don t match  Border: the edges are ragged, notched, or blurred  Color: the color within the mole or growth varies  Diameter: the mole or growth is larger than 6 mm (size of a pencil eraser)  Evolving: the size, shape, or color of the mole or growth is changing (evolving is not shown in the images below)    Checking for other types of skin cancer  Basal cell carcinoma or squamous cell carcinoma have symptoms such as:     A spot or mole that looks different from all other marks on your skin  Changes in how an area feels, such as itching, tenderness, or pain  Changes in the skin's  surface, such as oozing, bleeding, or scaliness  A sore that does not heal  New swelling or redness beyond the border of a mole    Who s at risk?  Anyone can get skin cancer. But you are at greater risk if you have:   Fair skin, light-colored hair, or light-colored eyes  Many moles or abnormal moles on your skin  A history of sunburns from sunlight or tanning beds  A family history of skin cancer  A history of exposure to radiation or chemicals  A weakened immune system  If you have had skin cancer in the past, you are at risk for recurring skin cancer.   How to check your skin  Do your monthly skin checkups in front of a full-length mirror. Check all parts of your body, including your:   Head (ears, face, neck, and scalp)  Torso (front, back, and sides)  Arms (tops, undersides, upper, and lower armpits)  Hands (palms, backs, and fingers, including under the nails)  Buttocks and genitals  Legs (front, back, and sides)  Feet (tops, soles, toes, including under the nails, and between toes)  If you have a lot of moles, take digital photos of them each month. Make sure to take photos both up close and from a distance. These can help you see if any moles change over time.   Most skin changes are not cancer. But if you see any changes in your skin, call your doctor right away. Only he or she can diagnose a problem. If you have skin cancer, seeing your doctor can be the first step toward getting the treatment that could save your life.   [employer] last reviewed this educational content on 4/1/2019 2000-2020 [employer]. [address] [place] [address], [place] [address]. All rights reserved. This information is not intended as a substitute for professional medical care. Always follow your healthcare professional's instructions.       When should I call my doctor?  If you are worsening or not improving, please, contact us or seek urgent care as noted below.     Who should I call with questions (adults)?    [facility] [phone]  For urgent needs outside of business hours call the [facility] at [phone] and ask for the dermatology resident on call to be paged  If this is a medical emergency and you are unable to reach an ER, Call [phone]      If you need a prescription refill, please contact your pharmacy. Refills are approved or denied by our Physicians during normal business hours, Monday through Friday.  Per office policy, refills will not be granted if you have not been seen within the past year (or sooner depending on the condition).

## 2025-04-22 NOTE — LETTER
4/22/2025      Asa Mann  [address] [facility] [id number]      Dear Colleague,    Thank you for referring your patient, Asa Mann, to the [facility]. Please see a copy of my visit note below.    [facility] Dermatology Note  Encounter Date: Apr 22, 2025  Office Visit     Dermatology Problem List:  1. Perianal dysthesia with erythema   DDX: LS, LSC, intertrigo, irritant derm    ____________________________________________    Assessment & Plan:     # Perianal dysthesia with erythema   DDX: LS, LSC, intertrigo, irritant derm  - Denies pruritus or perianal itching in sleep.   - Likely irritant derm vs localized hypersensitivity  - Ketoconazole assists with improvement, okay to continue as could be an aspect of intertrigo and/or anti-inflammatory azole properties.  - Low likelihood of MF and/or lupus given location, duration, localization.  - Would start topical tacrolimus (reviewed warming sensation and ways to adapt to continued use and application. This can help with non-steroidal anti-inflammatory to avoid any steroid induced worsening of intertrigo. Thin layer QPM to BID.   - Continue Aquaphor ad iqra.  - Okay to use ketoconazole as desired.   - In the future, consider topical gabapentin compound for neuropathic aspect and use in perianal pruritus/hypersensitivity.  - Continued follow up with medication management.  - Reviewed that fungal testing is low yield as he is already on treatment. Bedside tests are not particularly sensitive. PAS was performed on tissue. There is the question of colonization vs pathogen, especially in genital skin.      Procedures Performed:   None  None    Reviewed most recent biopsy performed by colorectal; read by dermpath.    Follow-up: 2 month(s) in-person, or earlier for new or changing lesions    Staff:     Deepa Sanders MD PhD  Dermatology,  Dermatopathology    ____________________________________________    CC: Derm Problem (Perianal rash on and off for 10 years)      HPI:  Asa Mann is a(n) 45 year old male who presents today as a return patient for evaluation of persistent and bothersome perianal irritation/sensation and rash.    Last saw Dr. Alexis in 2023 for skin check and plantar wart.    Try hydrocortisone, then antifungal.  Thought it was Lichen sclerosis, considered biopsy but was getting on a plane.     Rash is never GONE. Saw PCP and colorectal surgery - treated with Zinc suppository/cream.    Approximately 3 weeks ago, it became the worse it's ever been. Worsened with Zinc cream. Urgent care was unsure if it was fungal. Sent to GI/colorectal where they discussed fungal vs irritant vs other.     He has been frustrated by the course of this chronic problem. Denies pruritus. Denies itch during sleep.  Looking for answers, firm diagnosis and treatment.   Would like additional fungal testing, blood testing.    Concerned it could be MF vs lupus.       Regimen today:  - Ketoconazole cream (BID) x 2 weeks.    Aquaphor works well.       Patient is otherwise feeling well, without additional skin concerns.     Labs Reviewed:    Lab Results   Component Value Date    WBC 5.0 04/09/2024    HGB 16.1 04/09/2024    HCT 45.5 04/09/2024     04/09/2024     04/09/2024    POTASSIUM 4.4 04/09/2024    CHLORIDE 104 04/09/2024    CO2 29 04/09/2024    BUN 11.8 04/09/2024    CR 1.02 04/09/2024     (H) 04/09/2024    AST 26 04/09/2024    ALT 54 04/09/2024    ALKPHOS 72 04/09/2024    BILITOTAL 0.5 04/09/2024    INR 1.05 04/09/2024        Physical Exam:  Vitals: There were no vitals taken for this visit.  SKIN: Focused examination of genital and perianal skin was performed. The medical assistant was present during the entirety of pre-exam history and exam.   - Perianal area with faint, ill defined pink erythema without superficial scale,  trailing borders or desquamation.  - Well defined post-procedural punch biopsy, well healing with out induration or drainage.     The erythema extends from the posterior scrotum and surrounding the anus without discrete borders.  Area is otherwise clean/dry/intact.    - No other lesions of concern on areas examined.       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 Early features of lichen simplex chronicus, due to chronic frictional trauma, are seen. Clinical correlation is recommended.          Medications:  Current Outpatient Medications   Medication Sig Dispense Refill     alfuzosin ER (UROXATRAL) 10 MG 24 hr tablet Take 1 tablet (10 mg) by mouth daily. 90 tablet 3     FLUoxetine (PROZAC) 10 MG capsule Take 1 capsule (10 mg) by mouth daily. 90 capsule 3     ibuprofen (ADVIL/MOTRIN) 200 MG tablet Take 200 mg by mouth every 4 hours as needed for mild pain       ketoconazole (NIZORAL) 2 % external cream Apply topically daily. 30 g 0     Probiotic Product (PROBIOTIC-10 PO)        psyllium (METAMUCIL/KONSYL) capsule        No current facility-administered medications for this visit.      Past Medical History:   Patient Active Problem List   Diagnosis     Benign neoplasm of skin     History of thyroid disease     Ocular migraine - Both Eyes     Major Depression     Generalized anxiety disorder     Benign prostatic hyperplasia with weak urinary stream     Skin cancer screening     Verruca vulgaris     Seborrheic keratosis     Multiple melanocytic nevi     Hyperlipidemia     Irritable bowel syndrome with both constipation and diarrhea     Pelvic floor dysfunction     Urinary  hesitancy       Past Medical History:   Diagnosis Date     Anxiety      Hyperlipidemia      Snoring        CC: Primary Care Provider: Rita Rios or Referring Provider: Princess Seymour      Again, thank you for allowing me to participate in the care of your patient.        Sincerely,        Deepa Sanders MD    Electronically signed

## 2025-04-23 ENCOUNTER — MYC MEDICAL ADVICE (OUTPATIENT)
Dept: DERMATOLOGY | Facility: CLINIC | Age: 45
End: 2025-04-23
Payer: COMMERCIAL

## 2025-04-23 DIAGNOSIS — R21 PERIANAL RASH: ICD-10-CM

## 2025-04-26 RX ORDER — KETOCONAZOLE 20 MG/G
CREAM TOPICAL
Qty: 60 G | Refills: 4 | Status: SHIPPED | OUTPATIENT
Start: 2025-04-26

## 2025-08-13 ENCOUNTER — OFFICE VISIT (OUTPATIENT)
Dept: FAMILY MEDICINE | Facility: CLINIC | Age: 45
End: 2025-08-13
Payer: COMMERCIAL

## 2025-08-13 VITALS
DIASTOLIC BLOOD PRESSURE: 71 MMHG | SYSTOLIC BLOOD PRESSURE: 114 MMHG | BODY MASS INDEX: 25.04 KG/M2 | OXYGEN SATURATION: 98 % | TEMPERATURE: 98.3 F | RESPIRATION RATE: 15 BRPM | HEART RATE: 92 BPM | WEIGHT: 195 LBS

## 2025-08-13 DIAGNOSIS — M62.89 QUADRICEP TIGHTNESS: ICD-10-CM

## 2025-08-13 DIAGNOSIS — M25.512 ACUTE PAIN OF LEFT SHOULDER: Primary | ICD-10-CM

## 2025-08-13 DIAGNOSIS — R53.83 OTHER FATIGUE: ICD-10-CM

## 2025-08-13 DIAGNOSIS — M25.531 RIGHT WRIST PAIN: ICD-10-CM

## 2025-08-13 LAB
ERYTHROCYTE [DISTWIDTH] IN BLOOD BY AUTOMATED COUNT: 11.9 % (ref 10–15)
ERYTHROCYTE [SEDIMENTATION RATE] IN BLOOD BY WESTERGREN METHOD: 8 MM/HR (ref 0–15)
HCT VFR BLD AUTO: 43.7 % (ref 40–53)
HGB BLD-MCNC: 15 G/DL (ref 13.3–17.7)
MCH RBC QN AUTO: 32 PG (ref 26.5–33)
MCHC RBC AUTO-ENTMCNC: 34.3 G/DL (ref 31.5–36.5)
MCV RBC AUTO: 93.2 FL (ref 78–100)
PLATELET # BLD AUTO: 235 10E3/UL (ref 150–450)
RBC # BLD AUTO: 4.69 10E6/UL (ref 4.4–5.9)
WBC # BLD AUTO: 6.7 10E3/UL (ref 4–11)

## 2025-08-13 PROCEDURE — 84443 ASSAY THYROID STIM HORMONE: CPT | Performed by: INTERNAL MEDICINE

## 2025-08-13 PROCEDURE — 86140 C-REACTIVE PROTEIN: CPT | Performed by: INTERNAL MEDICINE

## 2025-08-13 PROCEDURE — 85652 RBC SED RATE AUTOMATED: CPT | Performed by: INTERNAL MEDICINE

## 2025-08-13 RX ORDER — CHLORAL HYDRATE 500 MG
CAPSULE ORAL
COMMUNITY

## 2025-08-14 ENCOUNTER — PATIENT OUTREACH (OUTPATIENT)
Dept: CARE COORDINATION | Facility: CLINIC | Age: 45
End: 2025-08-14
Payer: COMMERCIAL

## 2025-08-14 LAB
CRP SERPL-MCNC: <3 MG/L
TSH SERPL DL<=0.005 MIU/L-ACNC: 2.06 UIU/ML (ref 0.3–4.2)

## 2025-08-18 ENCOUNTER — PATIENT OUTREACH (OUTPATIENT)
Dept: CARE COORDINATION | Facility: CLINIC | Age: 45
End: 2025-08-18
Payer: COMMERCIAL

## 2025-08-22 ENCOUNTER — PRE VISIT (OUTPATIENT)
Dept: ORTHOPEDICS | Facility: CLINIC | Age: 45
End: 2025-08-22